# Patient Record
Sex: MALE | Race: WHITE | Employment: OTHER | URBAN - METROPOLITAN AREA
[De-identification: names, ages, dates, MRNs, and addresses within clinical notes are randomized per-mention and may not be internally consistent; named-entity substitution may affect disease eponyms.]

---

## 2018-03-19 ENCOUNTER — APPOINTMENT (OUTPATIENT)
Dept: CT IMAGING | Age: 63
DRG: 189 | End: 2018-03-19
Attending: EMERGENCY MEDICINE
Payer: COMMERCIAL

## 2018-03-19 ENCOUNTER — HOSPITAL ENCOUNTER (INPATIENT)
Age: 63
LOS: 3 days | Discharge: HOME HEALTH | DRG: 189 | End: 2018-03-22
Attending: EMERGENCY MEDICINE | Admitting: INTERNAL MEDICINE
Payer: COMMERCIAL

## 2018-03-19 DIAGNOSIS — R05.9 COUGH: ICD-10-CM

## 2018-03-19 DIAGNOSIS — J18.9 PNEUMONIA OF RIGHT LOWER LOBE DUE TO INFECTIOUS ORGANISM: ICD-10-CM

## 2018-03-19 DIAGNOSIS — J96.01 ACUTE RESPIRATORY FAILURE WITH HYPOXIA (HCC): Primary | ICD-10-CM

## 2018-03-19 DIAGNOSIS — J90 PLEURAL EFFUSION: ICD-10-CM

## 2018-03-19 PROBLEM — J96.90 RESPIRATORY FAILURE (HCC): Status: ACTIVE | Noted: 2018-03-19

## 2018-03-19 LAB
ALBUMIN SERPL-MCNC: 3.9 G/DL (ref 3.5–5)
ALBUMIN/GLOB SERPL: 1.1 {RATIO} (ref 1.1–2.2)
ALP SERPL-CCNC: 110 U/L (ref 45–117)
ALT SERPL-CCNC: 40 U/L (ref 12–78)
ANION GAP SERPL CALC-SCNC: 8 MMOL/L (ref 5–15)
AST SERPL-CCNC: 22 U/L (ref 15–37)
BASOPHILS # BLD: 0 K/UL (ref 0–0.1)
BASOPHILS NFR BLD: 0 % (ref 0–1)
BILIRUB SERPL-MCNC: 0.8 MG/DL (ref 0.2–1)
BNP SERPL-MCNC: 65 PG/ML (ref 0–125)
BUN SERPL-MCNC: 36 MG/DL (ref 6–20)
BUN/CREAT SERPL: 23 (ref 12–20)
CALCIUM SERPL-MCNC: 9.3 MG/DL (ref 8.5–10.1)
CHLORIDE SERPL-SCNC: 93 MMOL/L (ref 97–108)
CK MB CFR SERPL CALC: 1.6 % (ref 0–2.5)
CK MB SERPL-MCNC: 2.7 NG/ML (ref 5–25)
CK SERPL-CCNC: 171 U/L (ref 39–308)
CO2 SERPL-SCNC: 31 MMOL/L (ref 21–32)
CREAT SERPL-MCNC: 1.59 MG/DL (ref 0.7–1.3)
DIFFERENTIAL METHOD BLD: ABNORMAL
EOSINOPHIL # BLD: 0.2 K/UL (ref 0–0.4)
EOSINOPHIL NFR BLD: 2 % (ref 0–7)
ERYTHROCYTE [DISTWIDTH] IN BLOOD BY AUTOMATED COUNT: 14.2 % (ref 11.5–14.5)
GLOBULIN SER CALC-MCNC: 3.4 G/DL (ref 2–4)
GLUCOSE SERPL-MCNC: 372 MG/DL (ref 65–100)
HCT VFR BLD AUTO: 44.1 % (ref 36.6–50.3)
HGB BLD-MCNC: 14.7 G/DL (ref 12.1–17)
IMM GRANULOCYTES # BLD: 0.2 K/UL (ref 0–0.04)
IMM GRANULOCYTES NFR BLD AUTO: 2 % (ref 0–0.5)
LACTATE SERPL-SCNC: 2.7 MMOL/L (ref 0.4–2)
LACTATE SERPL-SCNC: 5.6 MMOL/L (ref 0.4–2)
LYMPHOCYTES # BLD: 1.5 K/UL (ref 0.8–3.5)
LYMPHOCYTES NFR BLD: 13 % (ref 12–49)
MCH RBC QN AUTO: 28.5 PG (ref 26–34)
MCHC RBC AUTO-ENTMCNC: 33.3 G/DL (ref 30–36.5)
MCV RBC AUTO: 85.5 FL (ref 80–99)
MONOCYTES # BLD: 1.4 K/UL (ref 0–1)
MONOCYTES NFR BLD: 13 % (ref 5–13)
NEUTS SEG # BLD: 8.2 K/UL (ref 1.8–8)
NEUTS SEG NFR BLD: 71 % (ref 32–75)
NRBC # BLD: 0 K/UL (ref 0–0.01)
NRBC BLD-RTO: 0 PER 100 WBC
PLATELET # BLD AUTO: 212 K/UL (ref 150–400)
PMV BLD AUTO: 9.6 FL (ref 8.9–12.9)
POTASSIUM SERPL-SCNC: 3.7 MMOL/L (ref 3.5–5.1)
PROT SERPL-MCNC: 7.3 G/DL (ref 6.4–8.2)
RBC # BLD AUTO: 5.16 M/UL (ref 4.1–5.7)
SODIUM SERPL-SCNC: 132 MMOL/L (ref 136–145)
TROPONIN I SERPL-MCNC: <0.04 NG/ML
WBC # BLD AUTO: 11.6 K/UL (ref 4.1–11.1)

## 2018-03-19 PROCEDURE — 82550 ASSAY OF CK (CPK): CPT | Performed by: EMERGENCY MEDICINE

## 2018-03-19 PROCEDURE — 77030029684 HC NEB SM VOL KT MONA -A

## 2018-03-19 PROCEDURE — 71275 CT ANGIOGRAPHY CHEST: CPT

## 2018-03-19 PROCEDURE — 99284 EMERGENCY DEPT VISIT MOD MDM: CPT

## 2018-03-19 PROCEDURE — 74011636320 HC RX REV CODE- 636/320: Performed by: EMERGENCY MEDICINE

## 2018-03-19 PROCEDURE — 74011250636 HC RX REV CODE- 250/636: Performed by: INTERNAL MEDICINE

## 2018-03-19 PROCEDURE — 84484 ASSAY OF TROPONIN QUANT: CPT | Performed by: EMERGENCY MEDICINE

## 2018-03-19 PROCEDURE — 80053 COMPREHEN METABOLIC PANEL: CPT | Performed by: EMERGENCY MEDICINE

## 2018-03-19 PROCEDURE — 94640 AIRWAY INHALATION TREATMENT: CPT

## 2018-03-19 PROCEDURE — 96361 HYDRATE IV INFUSION ADD-ON: CPT

## 2018-03-19 PROCEDURE — 36415 COLL VENOUS BLD VENIPUNCTURE: CPT | Performed by: INTERNAL MEDICINE

## 2018-03-19 PROCEDURE — 96375 TX/PRO/DX INJ NEW DRUG ADDON: CPT

## 2018-03-19 PROCEDURE — 65660000000 HC RM CCU STEPDOWN

## 2018-03-19 PROCEDURE — 87040 BLOOD CULTURE FOR BACTERIA: CPT | Performed by: EMERGENCY MEDICINE

## 2018-03-19 PROCEDURE — 85025 COMPLETE CBC W/AUTO DIFF WBC: CPT | Performed by: EMERGENCY MEDICINE

## 2018-03-19 PROCEDURE — 74011000250 HC RX REV CODE- 250: Performed by: INTERNAL MEDICINE

## 2018-03-19 PROCEDURE — 74011000250 HC RX REV CODE- 250: Performed by: EMERGENCY MEDICINE

## 2018-03-19 PROCEDURE — 74011250637 HC RX REV CODE- 250/637: Performed by: EMERGENCY MEDICINE

## 2018-03-19 PROCEDURE — 83605 ASSAY OF LACTIC ACID: CPT | Performed by: EMERGENCY MEDICINE

## 2018-03-19 PROCEDURE — 96365 THER/PROPH/DIAG IV INF INIT: CPT

## 2018-03-19 PROCEDURE — 74011250637 HC RX REV CODE- 250/637: Performed by: INTERNAL MEDICINE

## 2018-03-19 PROCEDURE — 74011000258 HC RX REV CODE- 258: Performed by: EMERGENCY MEDICINE

## 2018-03-19 PROCEDURE — 83605 ASSAY OF LACTIC ACID: CPT | Performed by: INTERNAL MEDICINE

## 2018-03-19 PROCEDURE — 74011250636 HC RX REV CODE- 250/636: Performed by: EMERGENCY MEDICINE

## 2018-03-19 PROCEDURE — 83880 ASSAY OF NATRIURETIC PEPTIDE: CPT | Performed by: EMERGENCY MEDICINE

## 2018-03-19 RX ORDER — LEVALBUTEROL INHALATION SOLUTION 1.25 MG/3ML
1.25 SOLUTION RESPIRATORY (INHALATION)
Status: DISCONTINUED | OUTPATIENT
Start: 2018-03-19 | End: 2018-03-22 | Stop reason: HOSPADM

## 2018-03-19 RX ORDER — BISACODYL 5 MG
5 TABLET, DELAYED RELEASE (ENTERIC COATED) ORAL DAILY PRN
Status: DISCONTINUED | OUTPATIENT
Start: 2018-03-19 | End: 2018-03-22 | Stop reason: HOSPADM

## 2018-03-19 RX ORDER — SODIUM CHLORIDE 0.9 % (FLUSH) 0.9 %
5-10 SYRINGE (ML) INJECTION EVERY 8 HOURS
Status: DISCONTINUED | OUTPATIENT
Start: 2018-03-19 | End: 2018-03-22 | Stop reason: HOSPADM

## 2018-03-19 RX ORDER — GUAIFENESIN 600 MG/1
600 TABLET, EXTENDED RELEASE ORAL EVERY 12 HOURS
Status: DISCONTINUED | OUTPATIENT
Start: 2018-03-19 | End: 2018-03-22 | Stop reason: HOSPADM

## 2018-03-19 RX ORDER — AMLODIPINE BESYLATE 5 MG/1
5 TABLET ORAL DAILY
Status: ON HOLD | COMMUNITY
End: 2018-03-22

## 2018-03-19 RX ORDER — HEPARIN SODIUM 5000 [USP'U]/ML
5000 INJECTION, SOLUTION INTRAVENOUS; SUBCUTANEOUS EVERY 8 HOURS
Status: DISCONTINUED | OUTPATIENT
Start: 2018-03-19 | End: 2018-03-22 | Stop reason: HOSPADM

## 2018-03-19 RX ORDER — SODIUM CHLORIDE 9 MG/ML
50 INJECTION, SOLUTION INTRAVENOUS
Status: COMPLETED | OUTPATIENT
Start: 2018-03-19 | End: 2018-03-19

## 2018-03-19 RX ORDER — SODIUM CHLORIDE 0.9 % (FLUSH) 0.9 %
5-10 SYRINGE (ML) INJECTION AS NEEDED
Status: DISCONTINUED | OUTPATIENT
Start: 2018-03-19 | End: 2018-03-22 | Stop reason: HOSPADM

## 2018-03-19 RX ORDER — ALLOPURINOL 100 MG/1
200 TABLET ORAL DAILY
Status: ON HOLD | COMMUNITY
End: 2018-03-22

## 2018-03-19 RX ORDER — ONDANSETRON 2 MG/ML
4 INJECTION INTRAMUSCULAR; INTRAVENOUS
Status: DISCONTINUED | OUTPATIENT
Start: 2018-03-19 | End: 2018-03-22 | Stop reason: HOSPADM

## 2018-03-19 RX ORDER — GUAIFENESIN/DEXTROMETHORPHAN 100-10MG/5
5 SYRUP ORAL
Status: DISCONTINUED | OUTPATIENT
Start: 2018-03-19 | End: 2018-03-22 | Stop reason: HOSPADM

## 2018-03-19 RX ORDER — CODEINE PHOSPHATE AND GUAIFENESIN 10; 100 MG/5ML; MG/5ML
5-10 SOLUTION ORAL
Status: ON HOLD | COMMUNITY
End: 2018-03-22

## 2018-03-19 RX ORDER — SODIUM CHLORIDE 9 MG/ML
125 INJECTION, SOLUTION INTRAVENOUS CONTINUOUS
Status: DISCONTINUED | OUTPATIENT
Start: 2018-03-19 | End: 2018-03-21

## 2018-03-19 RX ORDER — SODIUM CHLORIDE 0.9 % (FLUSH) 0.9 %
10 SYRINGE (ML) INJECTION
Status: COMPLETED | OUTPATIENT
Start: 2018-03-19 | End: 2018-03-19

## 2018-03-19 RX ORDER — ALBUTEROL SULFATE 2.5 MG/.5ML
5 SOLUTION RESPIRATORY (INHALATION)
Status: COMPLETED | OUTPATIENT
Start: 2018-03-19 | End: 2018-03-19

## 2018-03-19 RX ORDER — SERTRALINE HYDROCHLORIDE 25 MG/1
25 TABLET, FILM COATED ORAL DAILY
Status: ON HOLD | COMMUNITY
End: 2018-03-22

## 2018-03-19 RX ORDER — HYDROCODONE POLISTIREX AND CHLORPHENIRAMINE POLISTIREX 10; 8 MG/5ML; MG/5ML
5 SUSPENSION, EXTENDED RELEASE ORAL
Status: COMPLETED | OUTPATIENT
Start: 2018-03-19 | End: 2018-03-19

## 2018-03-19 RX ORDER — AZITHROMYCIN 250 MG/1
250 TABLET, FILM COATED ORAL SEE ADMIN INSTRUCTIONS
Status: ON HOLD | COMMUNITY
End: 2018-03-22

## 2018-03-19 RX ORDER — HYDROCHLOROTHIAZIDE 25 MG/1
25 TABLET ORAL DAILY
COMMUNITY
End: 2018-03-22

## 2018-03-19 RX ORDER — ALBUTEROL SULFATE 90 UG/1
1-2 AEROSOL, METERED RESPIRATORY (INHALATION)
Status: ON HOLD | COMMUNITY
End: 2018-03-22

## 2018-03-19 RX ORDER — HYDRALAZINE HYDROCHLORIDE 20 MG/ML
10 INJECTION INTRAMUSCULAR; INTRAVENOUS
Status: DISCONTINUED | OUTPATIENT
Start: 2018-03-19 | End: 2018-03-22 | Stop reason: HOSPADM

## 2018-03-19 RX ORDER — ASPIRIN 81 MG/1
81 TABLET ORAL DAILY
Status: ON HOLD | COMMUNITY
End: 2018-03-22

## 2018-03-19 RX ORDER — LISINOPRIL 10 MG/1
10 TABLET ORAL DAILY
Status: ON HOLD | COMMUNITY
End: 2018-03-22

## 2018-03-19 RX ORDER — ACETAMINOPHEN 325 MG/1
650 TABLET ORAL
Status: DISCONTINUED | OUTPATIENT
Start: 2018-03-19 | End: 2018-03-22 | Stop reason: HOSPADM

## 2018-03-19 RX ADMIN — Medication 10 ML: at 23:49

## 2018-03-19 RX ADMIN — Medication 10 ML: at 18:00

## 2018-03-19 RX ADMIN — SODIUM CHLORIDE 1000 ML: 900 INJECTION, SOLUTION INTRAVENOUS at 12:05

## 2018-03-19 RX ADMIN — LEVALBUTEROL HYDROCHLORIDE 1.25 MG: 1.25 SOLUTION RESPIRATORY (INHALATION) at 16:50

## 2018-03-19 RX ADMIN — HEPARIN SODIUM 5000 UNITS: 5000 INJECTION, SOLUTION INTRAVENOUS; SUBCUTANEOUS at 17:59

## 2018-03-19 RX ADMIN — GUAIFENESIN 600 MG: 600 TABLET, EXTENDED RELEASE ORAL at 21:04

## 2018-03-19 RX ADMIN — ALBUTEROL SULFATE 5 MG: 2.5 SOLUTION RESPIRATORY (INHALATION) at 12:05

## 2018-03-19 RX ADMIN — CEFTRIAXONE 1 G: 1 INJECTION, POWDER, FOR SOLUTION INTRAMUSCULAR; INTRAVENOUS at 13:49

## 2018-03-19 RX ADMIN — HEPARIN SODIUM 5000 UNITS: 5000 INJECTION, SOLUTION INTRAVENOUS; SUBCUTANEOUS at 23:21

## 2018-03-19 RX ADMIN — METHYLPREDNISOLONE SODIUM SUCCINATE 125 MG: 125 INJECTION, POWDER, FOR SOLUTION INTRAMUSCULAR; INTRAVENOUS at 12:04

## 2018-03-19 RX ADMIN — HYDROCODONE POLISTIREX AND CHLORPHENIRAMINE POLISTIREX 5 ML: 10; 8 SUSPENSION, EXTENDED RELEASE ORAL at 12:04

## 2018-03-19 RX ADMIN — SODIUM CHLORIDE 75 ML/HR: 900 INJECTION, SOLUTION INTRAVENOUS at 18:00

## 2018-03-19 RX ADMIN — IOPAMIDOL 100 ML: 755 INJECTION, SOLUTION INTRAVENOUS at 14:20

## 2018-03-19 RX ADMIN — SODIUM CHLORIDE 50 ML/HR: 900 INJECTION, SOLUTION INTRAVENOUS at 14:21

## 2018-03-19 RX ADMIN — AZITHROMYCIN 500 MG: 500 INJECTION, POWDER, LYOPHILIZED, FOR SOLUTION INTRAVENOUS at 14:51

## 2018-03-19 RX ADMIN — METHYLPREDNISOLONE SODIUM SUCCINATE 40 MG: 40 INJECTION, POWDER, FOR SOLUTION INTRAMUSCULAR; INTRAVENOUS at 21:04

## 2018-03-19 RX ADMIN — GUAIFENESIN AND DEXTROMETHORPHAN 5 ML: 100; 10 SYRUP ORAL at 23:50

## 2018-03-19 RX ADMIN — Medication 10 ML: at 14:52

## 2018-03-19 RX ADMIN — LEVALBUTEROL HYDROCHLORIDE 1.25 MG: 1.25 SOLUTION RESPIRATORY (INHALATION) at 20:18

## 2018-03-19 RX ADMIN — Medication 10 ML: at 14:21

## 2018-03-19 RX ADMIN — SODIUM CHLORIDE 1000 ML: 900 INJECTION, SOLUTION INTRAVENOUS at 13:49

## 2018-03-19 RX ADMIN — SODIUM CHLORIDE 125 ML/HR: 900 INJECTION, SOLUTION INTRAVENOUS at 21:03

## 2018-03-19 NOTE — ED NOTES
TRANSFER - OUT REPORT:    Verbal report given to SASHA Nicholas(name) on Kya Fontenot  being transferred to [facility](unit) for routine progression of care       Report consisted of patients Situation, Background, Assessment and   Recommendations(SBAR). Information from the following report(s) SBAR, ED Summary and Recent Results was reviewed with the receiving nurse. Lines:   Peripheral IV 03/19/18 Right Antecubital (Active)   Site Assessment Clean, dry, & intact 3/19/2018 11:57 AM   Phlebitis Assessment 0 3/19/2018 11:57 AM   Infiltration Assessment 0 3/19/2018 11:57 AM   Dressing Status Clean, dry, & intact 3/19/2018 11:57 AM   Hub Color/Line Status Pink 3/19/2018 11:57 AM   Action Taken Blood drawn 3/19/2018 11:57 AM        Opportunity for questions and clarification was provided.       Patient transported with:   [employer]

## 2018-03-19 NOTE — ED PROVIDER NOTES
EMERGENCY DEPARTMENT HISTORY AND PHYSICAL EXAM      Date: 3/19/2018  Patient Name: Jeanette Hess    History of Presenting Illness     Chief Complaint   Patient presents with    Shortness of Breath     [facility]/o increased shortness of breath, completed 2 rounds Zithromax today with lwo o2 sat's    Cough     greenish sputum       History Provided By: Patient    HPI: Jeanette Hess, 58 y.o. male with PMHx significant for HTN, presents ambulatory to the ED as sent by [facility] with cc of constant, ongoing SOB with associated productive cough of yellow/green sputum x 6 weeks. Pt states that he went to [facility] today after experiencing symptoms for several weeks without relief; while there, he was given 2 duo-nebs after his SpO2 was found to be 88% on RA for which he was referred to the ED for further evaluation and treatment; he reports feeling better after treatment with the duo-nebs, however. He states he's been treated twice for symptoms, once with a Z-Todd and again with steroids and Tessalon Perles without significant relief. Pt states that he's now on a second round of Z-Todd and Prednisone as started on 3/17/18 without relief. He states the cough is worse at night. He reports having to sit up in order to help improve his SOB at night when trying to sleep. He denies any hx of emphysema, asthma, MI, DVT, or PE. He did receive the flu vaccination this season. He denies any leg swelling or calf pain, fever, CP, or further symptoms and complaints. PCP: Wilmer Forrest MD    There are no other complaints, changes, or physical findings at this time. Past History     Past Medical History:  Past Medical History:   Diagnosis Date    Hypertension        Past Surgical History:  Past Surgical History:   Procedure Laterality Date    HX LITHOTRIPSY         Family History:  History reviewed. No pertinent family history.     Social History:  Social History   Substance Use Topics    Smoking status: Former Smoker    Smokeless tobacco: Never Used    Alcohol use Yes       Allergies:  No Known Allergies      Review of Systems   Review of Systems   Constitutional: Negative. Negative for appetite change, chills, fatigue and fever. HENT: Negative. Negative for congestion, rhinorrhea, sinus pressure and sore throat. Eyes: Negative. Respiratory: Positive for cough and shortness of breath. Negative for choking, chest tightness and wheezing. Cardiovascular: Negative. Negative for chest pain, palpitations and leg swelling. Gastrointestinal: Negative for abdominal pain, constipation, diarrhea, nausea and vomiting. Endocrine: Negative. Genitourinary: Negative. Negative for difficulty urinating, dysuria, flank pain and urgency. Musculoskeletal: Negative. Negative for myalgias (calf pain). Skin: Negative. Neurological: Negative. Negative for dizziness, speech difficulty, weakness, light-headedness, numbness and headaches. Psychiatric/Behavioral: Negative. All other systems reviewed and are negative. Physical Exam   Physical Exam   Constitutional: He is oriented to person, place, and time. He appears well-developed and well-nourished. No distress. HENT:   Head: Normocephalic and atraumatic. Mouth/Throat: Oropharynx is clear and moist. No oropharyngeal exudate. Eyes: Conjunctivae and EOM are normal. Pupils are equal, round, and reactive to light. Neck: Normal range of motion. Neck supple. No JVD present. No tracheal deviation present. Cardiovascular: Regular rhythm, normal heart sounds and intact distal pulses. No murmur heard. Tachycardic   Pulmonary/Chest: No stridor. He is in respiratory distress. He has wheezes. He has no rales. He exhibits no tenderness. + rhonchi   Abdominal: Soft. He exhibits no distension. There is no tenderness. There is no rebound and no guarding. Obese   Musculoskeletal: Normal range of motion. He exhibits no edema or tenderness. Neurological: He is alert and oriented to person, place, and time. No cranial nerve deficit. No gross motor or sensory deficits    Skin: Skin is warm and dry. He is not diaphoretic. Psychiatric: He has a normal mood and affect. His behavior is normal.   Nursing note and vitals reviewed. Diagnostic Study Results     Labs -  Recent Results (from the past 12 hour(s))   CBC WITH AUTOMATED DIFF    Collection Time: 03/19/18 11:58 AM   Result Value Ref Range    WBC 11.6 (H) 4.1 - 11.1 K/uL    RBC 5.16 4.10 - 5.70 M/uL    HGB 14.7 12.1 - 17.0 g/dL    HCT 44.1 36.6 - 50.3 %    MCV 85.5 80.0 - 99.0 FL    MCH 28.5 26.0 - 34.0 PG    MCHC 33.3 30.0 - 36.5 g/dL    RDW 14.2 11.5 - 14.5 %    PLATELET 876 825 - 852 K/uL    MPV 9.6 8.9 - 12.9 FL    NRBC 0.0 0  WBC    ABSOLUTE NRBC 0.00 0.00 - 0.01 K/uL    NEUTROPHILS 71 32 - 75 %    LYMPHOCYTES 13 12 - 49 %    MONOCYTES 13 5 - 13 %    EOSINOPHILS 2 0 - 7 %    BASOPHILS 0 0 - 1 %    IMMATURE GRANULOCYTES 2 (H) 0.0 - 0.5 %    ABS. NEUTROPHILS 8.2 (H) 1.8 - 8.0 K/UL    ABS. LYMPHOCYTES 1.5 0.8 - 3.5 K/UL    ABS. MONOCYTES 1.4 (H) 0.0 - 1.0 K/UL    ABS. EOSINOPHILS 0.2 0.0 - 0.4 K/UL    ABS. BASOPHILS 0.0 0.0 - 0.1 K/UL    ABS. IMM. GRANS. 0.2 (H) 0.00 - 0.04 K/UL    DF AUTOMATED     METABOLIC PANEL, COMPREHENSIVE    Collection Time: 03/19/18 11:58 AM   Result Value Ref Range    Sodium 132 (L) 136 - 145 mmol/L    Potassium 3.7 3.5 - 5.1 mmol/L    Chloride 93 (L) 97 - 108 mmol/L    CO2 31 21 - 32 mmol/L    Anion gap 8 5 - 15 mmol/L    Glucose 372 (H) 65 - 100 mg/dL    BUN 36 (H) 6 - 20 MG/DL    Creatinine 1.59 (H) 0.70 - 1.30 MG/DL    BUN/Creatinine ratio 23 (H) 12 - 20      GFR est AA 54 (L) >60 ml/min/1.73m2    GFR est non-AA 44 (L) >60 ml/min/1.73m2    Calcium 9.3 8.5 - 10.1 MG/DL    Bilirubin, total 0.8 0.2 - 1.0 MG/DL    ALT (SGPT) 40 12 - 78 U/L    AST (SGOT) 22 15 - 37 U/L    Alk.  phosphatase 110 45 - 117 U/L    Protein, total 7.3 6.4 - 8.2 g/dL    Albumin 3.9 3.5 - 5.0 g/dL    Globulin 3.4 2.0 - 4.0 g/dL    A-G Ratio 1.1 1.1 - 2.2     CK W/ CKMB & INDEX    Collection Time: 03/19/18 11:58 AM   Result Value Ref Range     39 - 308 U/L    CK - MB 2.7 <3.6 NG/ML    CK-MB Index 1.6 0 - 2.5     NT-PRO BNP    Collection Time: 03/19/18 11:58 AM   Result Value Ref Range    NT pro-BNP 65 0 - 125 PG/ML   TROPONIN I    Collection Time: 03/19/18 11:58 AM   Result Value Ref Range    Troponin-I, Qt. <0.04 <0.05 ng/mL   LACTIC ACID    Collection Time: 03/19/18  1:39 PM   Result Value Ref Range    Lactic acid 2.7 (HH) 0.4 - 2.0 MMOL/L       Radiologic Studies -   CT Results  (Last 48 hours)               03/19/18 1421  CTA CHEST W OR W WO CONT Final result    Impression:  IMPRESSION:   No evidence of acute pulmonary embolus. Bilateral lower lobe atelectasis. Narrative:  INDICATION: Dyspnea on exertion. Tachycardia. COMPARISON:None       TECHNIQUE:     Routine noncontrast imaging the chest was performed for localization purposes. Then, following the uneventful intravenous administration of 100 cc [id number],   thin helical axial images were obtained through the chest. 3D image   postprocessing was performed. CT dose reduction was achieved through use of a   standardized protocol tailored for this examination and automatic exposure   control for dose modulation. FINDINGS:       THYROID: No nodule. MEDIASTINUM: No mass or lymphadenopathy. MAN: No mass or lymphadenopathy. THORACIC AORTA: No dissection or aneurysm. PULMONARY ARTERIES: Main pulmonary artery is normal in caliber. No evidence of   acute pulmonary emboli. TRACHEA/BRONCHI: Patent. ESOPHAGUS: No wall thickening or dilatation. HEART: Normal in size. PLEURA: No effusion or pneumothorax. LUNGS: Bilateral lower lobe atelectasis. No pulmonary nodule. INCIDENTALLY IMAGED UPPER ABDOMEN: No focal abnormality. BONES: No destructive bone lesion.    ADDITIONAL COMMENTS: N/A Medical Decision Making   I am the first provider for this patient. I reviewed the vital signs, available nursing notes, past medical history, past surgical history, family history and social history. Vital Signs-Reviewed the patient's vital signs. Patient Vitals for the past 12 hrs:   Temp Pulse Resp BP SpO2   03/19/18 1313 - - - - 96 %   03/19/18 1303 - - - - 92 %   03/19/18 1303 - - - - (!) 88 %   03/19/18 1303 - - - - (!) 89 %   03/19/18 1238 - - - - 95 %   03/19/18 1235 - - - - 97 %   03/19/18 1234 - - - - 96 %   03/19/18 1233 - - - - 95 %   03/19/18 1232 - - - - 98 %   03/19/18 1231 - - - - 97 %   03/19/18 1230 - - - - 96 %   03/19/18 1229 - - - - 95 %   03/19/18 1228 - - - - 97 %   03/19/18 1221 - - - - 92 %   03/19/18 1130 - - - 118/76 91 %   03/19/18 1122 - - - 131/72 -   03/19/18 1120 - - - - 92 %   03/19/18 1033 97.9 °F (36.6 °C) (!) 120 22 125/88 94 %       Records Reviewed: Nursing Notes and Old Medical Records    Provider Notes (Medical Decision Making):   DDx: Bronchitis, pneumonia, CHF    ED Course:   Initial assessment performed. The patients presenting problems have been discussed, and they are in agreement with the care plan formulated and outlined with them. I have encouraged them to ask questions as they arise throughout their visit. Pt tachycardic upon arrival to the ED, which is likely do to 2 breathing tx's at [facility], X-ray from [facility] reviewed, ? Elevation right jennifer-diaphragm v. Pleural effusion, will obtain CT of chest.    After 2 additional breathing tx's, pt breathing improved, however pt still with O2 sats dropping to to 87-88% at rest in the bed with increase work of breathing. CT no PE, no infiltrate will start IV Ab for bronchitis, pt given steroids in the ED as well.     1:11 PM  I have just reevaluated the patient.  I have reviewed his vital signs and determined there is currently no worsening in their condition or physical exam. Results have been reviewed with them and their questions have been answered. Pt agreeable to admission at this time. Will speak with hospitalist.     CONSULT NOTE:   1:25 PM  Jazmin Chowdhury DO spoke with Dr. Jamie Diallo,   Specialty: Hospitalist  Discussed pt's hx, disposition, and available diagnostic and imaging results. Reviewed care plans. Consultant asks that we check a CTA chest. Pending results, she may evaluate pt for admission. CRITICAL CARE NOTE :    IMPENDING DETERIORATION -Respiratory and Cardiovascular  ASSOCIATED RISK FACTORS - Hypoxia and Dysrhythmia  MANAGEMENT- Bedside Assessment and Supervision of Care  INTERPRETATION -  Xrays, CT Scan, Blood Pressure and Cardiac Output Measures   INTERVENTIONS - Oxygen, steroids, nebulizer tx's, IV Ab  CASE REVIEW - Hospitalist, Nursing and Family  TREATMENT RESPONSE -Improved  PERFORMED BY - Self    NOTES   :  I have spent 40 minutes of critical care time involved in lab review, consultations with specialist, family decision- making, bedside attention and documentation. During this entire length of time I was immediately available to the patient . Nitish Cardoso DO    Disposition:  Admit Note:  1:33 PM  Patient is being admitted to the hospital by Dr. Jamie Diallo. The results of their tests and reasons for their admission have been discussed with them and/or available family. They convey agreement and understanding for the need to be admitted and for their admission diagnosis. Consultation has been made with the inpatient physician specialist for hospitalization. PLAN:  1. Admit to Hospitalist    Diagnosis     Clinical Impression:   1. Acute respiratory failure with hypoxia ([address]    2. Pneumonia of right lower lobe due to infectious organism ([address]    3. Pleural effusion        Attestations:     This note is prepared by Viviane Julio, acting as Scribe for Jazmin Chowdhury, [address] [place] [address], DO: The scribe's documentation has been prepared under my direction and personally reviewed by me in its entirety. I confirm that the note above accurately reflects all work, treatment, procedures, and medical decision making performed by me.

## 2018-03-19 NOTE — IP AVS SNAPSHOT
[address] [facility]. 
[phone] Patient: Anoop Tejada MRN: [id number] About your hospitalization You were admitted on:  March 19, 2018 You last received care in the:  [facility] CARDIOPULMONARY CARE You were discharged on:  March 22, 2018 Why you were hospitalized Your primary diagnosis was:  Not on File Your diagnoses also included:  Respiratory Failure (Hcc) Follow-up Information Follow up With Details Comments Contact Info Akanksha Livingston MD Go on 4/3/2018 For new patient appointment at 10:30AM  [address] [facility] [address] [phone] [employer]   [address] [facility] [address] [place] [address] 
[phone] Phys Other, MD   Patient can only remember the practice name and not the physician [facility] Go on 4/13/2018 appointment for class- starts at 1:00 pm [facility]. [address] [facility] 
[phone] Your Scheduled Appointments Tuesday April 03, 2018 10:30 AM EDT PHYSICAL PRE OP with Akanksha Livingston MD  
[address] ([address]) [address], [address] [address]  
[phone] Friday April 13, 2018  1:00 PM EDT  
DIABETES CLASS 1HR with SURVIVAL SKILLS CLASS [facility]  
[facility] ([address]) [address] [facility]. [facility].  
[phone] Discharge Orders None A check kwesi indicates which time of day the medication should be taken. My Medications START taking these medications Instructions Each Dose to Equal  
 Morning Noon Evening Bedtime  
 insulin glargine 100 unit/mL injection Commonly known as:  LANTUS Your last dose was: Your next dose is:    
   
   
 12 Units by SubCUTAneous route nightly. 12 Units L. acidoph & paracasei- S therm- Bifido 8 billion cell Cap cap Commonly known as:  LETICIA-Q/RISAQUAD Start taking on:  3/23/2018 Your last dose was: Your next dose is: Take 1 Cap by mouth daily. 1 Cap  
    
   
   
   
  
 levalbuterol 1.25 mg/3 mL Nebu Commonly known as:  Apryl American Your last dose was: Your next dose is:    
   
   
 3 mL by Nebulization route every four (4) hours as needed. 1.25 mg  
    
   
   
   
  
 melatonin 3 mg tablet Your last dose was: Your next dose is: Take 1 Tab by mouth nightly as needed. 3 mg Nebulizer Accessories Kit Your last dose was: Your next dose is:    
   
   
 by Nebulization route every six (6) hours as needed for Cough. predniSONE 10 mg tablet Commonly known as:  Sheela Lucero Start taking on:  3/23/2018 Your last dose was: Your next dose is: Take 2 Tabs by mouth daily (with breakfast). 1 tablet once a day for 2 days then 1/2 tablet once a day for 2 days 20 mg CHANGE how you take these medications Instructions Each Dose to Equal  
 Morning Noon Evening Bedtime  
 azithromycin 250 mg tablet Commonly known as:  Elvi Larsen What changed:  additional instructions Your last dose was: Your next dose is: Take 1 Tab by mouth See Admin Instructions. Take 1 tablet once a day for 3 days 250 mg CONTINUE taking these medications Instructions Each Dose to Equal  
 Morning Noon Evening Bedtime  
 albuterol 90 mcg/actuation inhaler Commonly known as:  PROVENTIL HFA, VENTOLIN HFA, PROAIR HFA Your last dose was: Your next dose is: Take 1-2 Puffs by inhalation every four (4) hours as needed for Wheezing or Shortness of Breath. 1-2 Puff  
    
   
   
   
  
 allopurinol 100 mg tablet Commonly known as:  Karolinaobe Price Your last dose was: Your next dose is: Take 2 Tabs by mouth daily. 200 mg  
    
   
   
   
  
 amLODIPine 5 mg tablet Commonly known as:  Peter Rendon Your last dose was: Your next dose is: Take 1 Tab by mouth daily. 5 mg  
    
   
   
   
  
 aspirin delayed-release 81 mg tablet Your last dose was: Your next dose is: Take 1 Tab by mouth daily. 81 mg  
    
   
   
   
  
 guaiFENesin-codeine 100-10 mg/5 mL solution Commonly known as:  ROBITUSSIN AC Your last dose was: Your next dose is: Take 5-10 mL by mouth every four (4) hours as needed for Cough. Max Daily Amount: 60 mL. 5-10 mL  
    
   
   
   
  
 lisinopril 10 mg tablet Commonly known as:  [place] Art Your last dose was: Your next dose is: Take 1 Tab by mouth daily. 10 mg  
    
   
   
   
  
 sertraline 25 mg tablet Commonly known as:  ZOLOFT Your last dose was: Your next dose is: Take 1 Tab by mouth daily. 25 mg  
    
   
   
   
  
  
STOP taking these medications   
 hydroCHLOROthiazide 25 mg tablet Commonly known as:  HYDRODIURIL Where to Get Your Medications Information on where to get these meds will be given to you by the nurse or doctor. ! Ask your nurse or doctor about these medications  
  albuterol 90 mcg/actuation inhaler  
 allopurinol 100 mg tablet  
 amLODIPine 5 mg tablet  
 aspirin delayed-release 81 mg tablet  
 azithromycin 250 mg tablet  
 guaiFENesin-codeine 100-10 mg/5 mL solution  
 insulin glargine 100 unit/mL injection L. acidoph & paracasei- S therm- Bifido 8 billion cell Cap cap  
 levalbuterol 1.25 mg/3 mL Nebu  
 lisinopril 10 mg tablet  
 melatonin 3 mg tablet Nebulizer Accessories Kit  
 predniSONE 10 mg tablet  
 sertraline 25 mg tablet Discharge Instructions None Introducing [facility]! Maryjo العراقي introduces [facility] [facility] patient portal. Now you can access parts of your medical record, email your doctor's office, and request medication refills online. 1. In your internet browser, go to [email or web address] 2. Click on the First Time User? Click Here link in the Sign In box. You will see the New Member Sign Up page. 3. Enter your [facility] Access Code exactly as it appears below. You will not need to use this code after youve completed the sign-up process. If you do not sign up before the expiration date, you must request a new code. · [facility] Access Code: [email or web address] Expires: 6/17/2018 11:22 AM 
 
4. Enter the last four digits of your Social Security Number (xxxx) and Date of Birth (mm/dd/yyyy) as indicated and click Submit. You will be taken to the next sign-up page. 5. Create a [facility] ID. This will be your [facility] login ID and cannot be changed, so think of one that is secure and easy to remember. 6. Create a [facility] password. You can change your password at any time. 7. Enter your Password Reset Question and Answer. This can be used at a later time if you forget your password. 8. Enter your e-mail address. You will receive e-mail notification when new information is available in [address] 9. Click Sign Up. You can now view and download portions of your medical record. 10. Click the Download Summary menu link to download a portable copy of your medical information. If you have questions, please visit the Frequently Asked Questions section of the [employer] website. Remember, [facility] is NOT to be used for urgent needs. For medical emergencies, dial 911. Now available from your iPhone and Android! Unresulted Labs-Please follow up with your PCP about these lab tests Order Current Status CULTURE, BLOOD, PAIRED Preliminary result Providers Seen During Your Hospitalization Provider Specialty Primary office phone Brannon Bass DO Emergency Medicine [phone] Thi Baker MD Hospitalist [phone] Myles Ron MD Hospitalist [phone] Your Primary Care Physician (PCP) Primary Care Physician Office Phone Office Fax OTHER, PHYS ** None ** ** None ** You are allergic to the following No active allergies Recent Documentation Height Weight BMI Smoking Status 1.651 m 112.8 kg 41.37 kg/m2 Former Smoker Emergency Contacts Name Discharge Info Relation Home Work Mobile Ian Boggs 82 CAREGIVER [3] Spouse [3] [phone] [address] [place] [address] CAREGIVER [3] Brother [24]   [phone] Patient Belongings The following personal items are in your possession at time of discharge: 
  Dental Appliances: None  Visual Aid: None      Home Medications: None   Jewelry: Ring, With patient  Clothing: Footwear, Pants, Shirt, Undergarments, With patient    Other Valuables: Cell Phone, Richarda Sacks (wallet and keys sent home) Please provide this summary of care documentation to your next provider. Signatures-by signing, you are acknowledging that this After Visit Summary has been reviewed with you and you have received a copy. Patient Signature:  ____________________________________________________________ Date:  ____________________________________________________________  
  
Rachele Sleeper Provider Signature:  ____________________________________________________________ Date:  ____________________________________________________________

## 2018-03-19 NOTE — PROGRESS NOTES
Pharmacy Clarification of the Prior to Admission Medication Regimen Retrospective to the Admission Medication Reconciliation    The patient was interviewed regarding clarification of the prior to admission medication regimen. Brother was present in room and obtained permission from patient to discuss drug regimen with visitor(s) present. Patient was questioned regarding use of any other inhalers, topical products, over the counter medications, herbal medications, vitamin products or ophthalmic/nasal/otic medication use. Patient was uncertain of the names and strengths of his medications. MHT called [facility], [phone], and spoke with Matt Woodruff, who was able to confirm the patients medication names and strengths. Information Obtained From: Patient, outpatient pharmacy    Recommendations/Findings: The following amendments were made to the patient's [place] [address] [place]:     1) Additions: MHT added all 9 medications below    2) Removals: NONE    3) Changes: NONE    4) Pertinent Pharmacy Findings:   aspirin delayed-release 81 mg tablet: Patient stated this agent is prescribed daily, but the patient takes it when he 'remembers'.  azithromycin (ZITHROMAX) 250 mg tablet: Patient started a 5 day regimen on 3/17/18. Patient has completed 3 days of therapy as of 3/19/18.  guaiFENesin-codeine (ROBITUSSIN AC) 100-10 mg/5 mL solution: Patient stated he does not measure out this agent, he 'swigs it' when needed. PTA medication list was corrected to the following:     Prior to Admission Medications   Prescriptions Last Dose Informant Patient Reported? Taking? albuterol (PROVENTIL HFA, VENTOLIN HFA, PROAIR HFA) 90 mcg/actuation inhaler 3/18/2018 at Unknown time Other Yes Yes   Sig: Take 1-2 Puffs by inhalation every four (4) hours as needed for Wheezing or Shortness of Breath.    allopurinol (ZYLOPRIM) 100 mg tablet 3/19/2018 at Unknown time Other Yes Yes   Sig: Take 200 mg by mouth daily. amLODIPine (NORVASC) 5 mg tablet 3/19/2018 at Unknown time Other Yes Yes   Sig: Take 5 mg by mouth daily. aspirin delayed-release 81 mg tablet 3/5/2018 at Unknown time Other Yes Yes   Sig: Take 81 mg by mouth daily. azithromycin (ZITHROMAX) 250 mg tablet 3/19/2018 at Unknown time Other Yes Yes   Sig: Take 250 mg by mouth See Admin Instructions. Take 2 tabs (500 mg) day 1, and 1 tab (250 mg) for days 2-5   guaiFENesin-codeine (ROBITUSSIN AC) 100-10 mg/5 mL solution 3/19/2018 at 0700 Other Yes Yes   Sig: Take 5-10 mL by mouth every four (4) hours as needed for Cough. hydroCHLOROthiazide (HYDRODIURIL) 25 mg tablet 3/19/2018 at Unknown time Other Yes Yes   Sig: Take 25 mg by mouth daily. lisinopril (PRINIVIL, ZESTRIL) 10 mg tablet 3/19/2018 at Unknown time Other Yes Yes   Sig: Take 10 mg by mouth daily. sertraline (ZOLOFT) 25 mg tablet 3/19/2018 at Unknown time Other Yes Yes   Sig: Take 25 mg by mouth daily.       Facility-Administered Medications: None          Thank you,  Campbell Ferrara CPhT  Medication History Pharmacy Technician

## 2018-03-19 NOTE — PROGRESS NOTES
TRANSFER - IN REPORT:    Verbal report received from Alena(name) on [place] [facility]  being received from ED(unit) for routine progression of care      Report consisted of patients Situation, Background, Assessment and   Recommendations(SBAR). Information from the following report(s) SBAR, Kardex, ED Summary, Procedure Summary, Intake/Output, MAR, Accordion, Recent Results, Med Rec Status and Cardiac Rhythm ST was reviewed with the receiving nurse. Opportunity for questions and clarification was provided. Assessment completed upon patients arrival to unit and care assumed. Primary Nurse Nataliia Hatch and María Gonzalez RN performed a dual skin assessment on this patient No impairment noted  Kristopher score is 20    1900  Lab called to report critial lactic of 5.6  Dr. Buck Lopez notified, new orders received. Increase fluids to 125 ml/hr, repeat lactics q4 until under 2, once lactic comes down fluids can return to 75 ml/hr. Bedside shift change report given to Janie Graf (oncoming nurse) by Nahum Lee (offgoing nurse). Report included the following information SBAR, Kardex, ED Summary, Procedure Summary, Intake/Output, MAR, Accordion, Recent Results, Med Rec Status and Cardiac Rhythm ST.     SHIFT SUMMARY:            [facility] NURSING NOTE   Admission Date 3/19/2018   Admission Diagnosis Respiratory failure ([address]   Consults None      Cardiac Monitoring [x] Yes [] No      Purposeful Hourly Rounding [x] Yes    Alyse Score Total Score: 2   Alyse score 3 or > [] Bed Alarm [] Avasys [] 1:1 sitter [] Patient refused (Place signed refusal form in chart)   Kristopher Score Kristopher Score: 20   Kristopher score 14 or < [] PMT consult [] Wound Care consult    []  Specialty bed  [] Nutrition consult      Influenza Vaccine Received Flu Vaccine for Current Season (usually Sept-March): Yes           Oxygen needs?  [] Room air Oxygen @  []1L    [x]2L    []3L   []4L    []5L   []6L     Use home O2? [] Yes [x] No  Perform O2 challenge test using smartphrase (.Homeoxygen)      Last bowel movement Last Bowel Movement Date: 03/18/18      Urinary Catheter             LDAs               Peripheral IV 03/19/18 Right Antecubital (Active)   Site Assessment Clean, dry, & intact 3/19/2018  4:50 PM   Phlebitis Assessment 0 3/19/2018  4:50 PM   Infiltration Assessment 0 3/19/2018  4:50 PM   Dressing Status Clean, dry, & intact 3/19/2018  4:50 PM   Dressing Type Tape;Transparent 3/19/2018  4:50 PM   Hub Color/Line Status Pink;Flushed 3/19/2018  4:50 PM   Action Taken Blood drawn 3/19/2018 11:57 AM       Peripheral IV 03/19/18 Left Arm (Active)   Site Assessment Clean, dry, & intact 3/19/2018  6:15 PM   Phlebitis Assessment 0 3/19/2018  6:15 PM   Infiltration Assessment 0 3/19/2018  6:15 PM   Dressing Status Clean, dry, & intact 3/19/2018  6:15 PM   Dressing Type Tape;Transparent 3/19/2018  6:15 PM   Hub Color/Line Status Blue;Flushed 3/19/2018  6:15 PM                         Readmission Risk Assessment Tool Score Low Risk            0       Total Score            Criteria that do not apply:    Has Seen PCP in Last 6 Months (Yes=3, No=0)    . Living with Significant Other. Assisted Living. LTAC. SNF. or   Rehab    Patient Length of Stay (>5 days = 3)    IP Visits Last 12 Months (1-3=4, 4=9, >4=11)    Pt.  Coverage (Medicare=5 , Medicaid, or Self-Pay=4)    Charlson Comorbidity Score (Age + Comorbid Conditions)       Expected Length of Stay - - -   Actual Length of Stay 0

## 2018-03-19 NOTE — H&P
Hospitalist Admission Note    NAME: Donna Valero   :  1955   MRN:  [id number]     Date/Time:  3/19/2018 2:20 PM    Patient PCP: Wilmer Forrest MD  ______________________________________________________________________  Given the patient's current clinical presentation, I have a high level of concern for decompensation if discharged from the emergency department. Complex decision making was performed, which includes reviewing the patient's available past medical records, laboratory results, and x-ray films. My assessment of this patient's clinical condition and my plan of care is as follows. Assessment / Plan:  Acute hypoxic respiratory failure  Acute bronchitis  Sepsis, POA  Lactic acidosis  -CTA chest neg for acute PE. B/l lower lobe atelectasis  -Bcx obtained  -empiric abx with rocephin and azithromycin  -IV steroids   -gentle IVF (s/p ivf bolus per sepsis protocol)  -repeat lactate  -nebs, antitussives, incentive spirometry     GAURANG  -gentle IVF    HTN  -resume home meds  -hydralazine prn    Depression  -cont' zoloft    Gout  -con't allopurinol    Code Status: Full  Surrogate Decision Maker: pt's wife  DVT Prophylaxis: heparin  GI Prophylaxis: not indicated  Baseline: independent       Subjective:   CHIEF COMPLAINT: sob, cough, congestion. HISTORY OF PRESENT ILLNESS:     Donna Valero is a 58 y.o.  male w pmhx significant for HTN  Present to ED c/o worsening of SO for apprx 6 weeks. Per pt, he states he has had ongoing symptoms of productive cough, fatigue, generalized weakness, and was treated with Z pack and PO steroids x2. He states the first time he went to [facility], he was started on a Zpack which symptoms started to improved. He then went to [place] and his wife caught the [place]". Pt then started feeling sick again with congestion and cough. He then went back to [facility] and was given PO prednisone with antitussives.   Pt reports symptoms progressively worsened so he called [facility] and was given an second round of [place] which he completed D3/5. Due to worsening of SOB, productive cough, inability to sleep at night due to cough, pt was seen again at [facility] and was found to be hypoxic with SpO2 88% on RA. Pt was given 2 rounds of duoneb and was referred to the ER for further evaluation. Pt denies any fever, chills, cp, palpitations, myalgia, n/v/d. He denied ever been diagnosed or tested for the flu. In the ED, vitals: T97.9, P 120, /88, SpO2 88% on RA, improved with [facility]  Pertinent labs: WBC 11.6, Na 132, K 3.7, Cr 1.59, lactate 2.7   CTA chest neg for PE.  B/l lower lobe atelectasis   We were asked to admit for work up and evaluation of the above problems. Past Medical History:   Diagnosis Date    Hypertension         Past Surgical History:   Procedure Laterality Date    HX LITHOTRIPSY         Social History   Substance Use Topics    Smoking status: Former Smoker    Smokeless tobacco: Never Used    Alcohol use Yes        History reviewed. No pertinent family history. No Known Allergies     Prior to Admission medications    Not on File       REVIEW OF SYSTEMS:     I am not able to complete the review of systems because:    The patient is intubated and sedated    The patient has altered mental status due to his acute medical problems    The patient has baseline aphasia from prior stroke(s)    The patient has baseline dementia and is not reliable historian    The patient is in acute medical distress and unable to provide information           Total of 12 systems reviewed as follows:       POSITIVE= BOLD text  Negative = text not BOLD  General:  fever, chills, sweats, generalized weakness, weight loss/gain,      loss of appetite   Eyes:    blurred vision, eye pain, loss of vision, double vision  ENT:    rhinorrhea, pharyngitis   Respiratory:   cough, sputum production, SOB, VIVAR, wheezing, pleuritic pain   Cardiology:   chest pain, palpitations, orthopnea, PND, edema, syncope   Gastrointestinal:  abdominal pain , N/V, diarrhea, dysphagia, constipation, bleeding   Genitourinary:  frequency, urgency, dysuria, hematuria, incontinence   Muskuloskeletal :  arthralgia, myalgia, back pain  Hematology:  easy bruising, nose or gum bleeding, lymphadenopathy   Dermatological: rash, ulceration, pruritis, color change / jaundice  Endocrine:   hot flashes or polydipsia   Neurological:  headache, dizziness, confusion, focal weakness, paresthesia,     Speech difficulties, memory loss, gait difficulty  Psychological: Feelings of anxiety, depression, agitation    Objective:   VITALS:    Visit Vitals    /76    Pulse (!) 120    Temp 97.9 °F (36.6 °C)    Resp 22    Ht 5' 4\" (1.626 m)    SpO2 96%       PHYSICAL EXAM:    General:    Alert, cooperative, no distress, appears stated age. HEENT: Atraumatic, anicteric sclerae, pink conjunctivae     No oral ulcers, mucosa moist, throat clear  Neck:  Supple, symmetrical,  thyroid: non tender  Lungs:   Coarse bs b/l. No wheezing or Rhonchi. No rales. Chest wall:  No tenderness. No accessory muscle use. Heart:   Regular  rhythm,  No  Murmur. No edema  Abdomen:   Soft, NT. ND  BS+  Extremities: No cyanosis. No clubbing,      Skin turgor normal, Radial dial pulse 2+  Skin:     Not pale. Not Jaundiced  No rashes   Psych:  Not depressed. Not anxious or agitated. Neurologic: No facial asymmetry. No aphasia or slurred speech. Symmetrical strength, Sensation grossly intact.  AAOx4.     _______________________________________________________________________  Care Plan discussed with:    Comments   Patient x    Family  x Pt's brother   RN x    Care Manager                    Consultant:  bianca ED physician   _______________________________________________________________________  Expected  Disposition:   Home with Family    HH/PT/OT/RN x   SNF/LTC    [facility] ________________________________________________________________________  TOTAL TIME:  65 Minutes    Critical Care Provided     Minutes non procedure based      Comments    x Reviewed previous records   >50% of visit spent in counseling and coordination of care x Discussion with patient and/or family and questions answered       ________________________________________________________________________  Signed: Kwame Huynh MD    Procedures: see electronic medical records for all procedures/Xrays and details which were not copied into this note but were reviewed prior to creation of Plan. LAB DATA REVIEWED:    Recent Results (from the past 24 hour(s))   CBC WITH AUTOMATED DIFF    Collection Time: 03/19/18 11:58 AM   Result Value Ref Range    WBC 11.6 (H) 4.1 - 11.1 K/uL    RBC 5.16 4.10 - 5.70 M/uL    HGB 14.7 12.1 - 17.0 g/dL    HCT 44.1 36.6 - 50.3 %    MCV 85.5 80.0 - 99.0 FL    MCH 28.5 26.0 - 34.0 PG    MCHC 33.3 30.0 - 36.5 g/dL    RDW 14.2 11.5 - 14.5 %    PLATELET 110 634 - 802 K/uL    MPV 9.6 8.9 - 12.9 FL    NRBC 0.0 0  WBC    ABSOLUTE NRBC 0.00 0.00 - 0.01 K/uL    NEUTROPHILS 71 32 - 75 %    LYMPHOCYTES 13 12 - 49 %    MONOCYTES 13 5 - 13 %    EOSINOPHILS 2 0 - 7 %    BASOPHILS 0 0 - 1 %    IMMATURE GRANULOCYTES 2 (H) 0.0 - 0.5 %    ABS. NEUTROPHILS 8.2 (H) 1.8 - 8.0 K/UL    ABS. LYMPHOCYTES 1.5 0.8 - 3.5 K/UL    ABS. MONOCYTES 1.4 (H) 0.0 - 1.0 K/UL    ABS. EOSINOPHILS 0.2 0.0 - 0.4 K/UL    ABS. BASOPHILS 0.0 0.0 - 0.1 K/UL    ABS. IMM.  GRANS. 0.2 (H) 0.00 - 0.04 K/UL    DF AUTOMATED     METABOLIC PANEL, COMPREHENSIVE    Collection Time: 03/19/18 11:58 AM   Result Value Ref Range    Sodium 132 (L) 136 - 145 mmol/L    Potassium 3.7 3.5 - 5.1 mmol/L    Chloride 93 (L) 97 - 108 mmol/L    CO2 31 21 - 32 mmol/L    Anion gap 8 5 - 15 mmol/L    Glucose 372 (H) 65 - 100 mg/dL    BUN 36 (H) 6 - 20 MG/DL    Creatinine 1.59 (H) 0.70 - 1.30 MG/DL    BUN/Creatinine ratio 23 (H) 12 - 20      GFR est AA 54 (L) >60 ml/min/1.73m2    GFR est non-AA 44 (L) >60 ml/min/1.73m2    Calcium 9.3 8.5 - 10.1 MG/DL    Bilirubin, total 0.8 0.2 - 1.0 MG/DL    ALT (SGPT) 40 12 - 78 U/L    AST (SGOT) 22 15 - 37 U/L    Alk.  phosphatase 110 45 - 117 U/L    Protein, total 7.3 6.4 - 8.2 g/dL    Albumin 3.9 3.5 - 5.0 g/dL    Globulin 3.4 2.0 - 4.0 g/dL    A-G Ratio 1.1 1.1 - 2.2     CK W/ CKMB & INDEX    Collection Time: 03/19/18 11:58 AM   Result Value Ref Range     39 - 308 U/L    CK - MB 2.7 <3.6 NG/ML    CK-MB Index 1.6 0 - 2.5     NT-PRO BNP    Collection Time: 03/19/18 11:58 AM   Result Value Ref Range    NT pro-BNP 65 0 - 125 PG/ML   TROPONIN I    Collection Time: 03/19/18 11:58 AM   Result Value Ref Range    Troponin-I, Qt. <0.04 <0.05 ng/mL   LACTIC ACID    Collection Time: 03/19/18  1:39 PM   Result Value Ref Range    Lactic acid 2.7 (HH) 0.4 - 2.0 MMOL/L

## 2018-03-19 NOTE — ED TRIAGE NOTES
Pt arrived ambulatory from triage to room 36 with cc cough. Per pt he has been sick since super bowl weekend. [facility]. Pt was started on a second z-pack on 3/17/18. [facility] because he was not feeling any better. Per pt his SpO2 was 88% after 2 neb treatments so they sent pt here to be evaluated. Pt denies fever, chills, N/V. Pt in no acute distress. VSS.

## 2018-03-19 NOTE — PROGRESS NOTES
Problem: Falls - Risk of  Goal: *Absence of Falls  Document Alyse Fall Risk and appropriate interventions in the flowsheet.   Outcome: Progressing Towards Goal  Fall Risk Interventions:

## 2018-03-20 ENCOUNTER — APPOINTMENT (OUTPATIENT)
Dept: ULTRASOUND IMAGING | Age: 63
DRG: 189 | End: 2018-03-20
Attending: INTERNAL MEDICINE
Payer: COMMERCIAL

## 2018-03-20 LAB
ANION GAP SERPL CALC-SCNC: 12 MMOL/L (ref 5–15)
APPEARANCE UR: CLEAR
BACTERIA URNS QL MICRO: NEGATIVE /HPF
BASOPHILS # BLD: 0 K/UL (ref 0–0.1)
BASOPHILS NFR BLD: 0 % (ref 0–1)
BILIRUB UR QL: NEGATIVE
BUN SERPL-MCNC: 39 MG/DL (ref 6–20)
BUN/CREAT SERPL: 20 (ref 12–20)
CALCIUM SERPL-MCNC: 8.9 MG/DL (ref 8.5–10.1)
CHLORIDE SERPL-SCNC: 98 MMOL/L (ref 97–108)
CO2 SERPL-SCNC: 22 MMOL/L (ref 21–32)
COLOR UR: ABNORMAL
CREAT SERPL-MCNC: 1.93 MG/DL (ref 0.7–1.3)
DIFFERENTIAL METHOD BLD: ABNORMAL
EOSINOPHIL # BLD: 0 K/UL (ref 0–0.4)
EOSINOPHIL NFR BLD: 0 % (ref 0–7)
EPITH CASTS URNS QL MICRO: ABNORMAL /LPF
ERYTHROCYTE [DISTWIDTH] IN BLOOD BY AUTOMATED COUNT: 14.2 % (ref 11.5–14.5)
EST. AVERAGE GLUCOSE BLD GHB EST-MCNC: 289 MG/DL
GLUCOSE BLD STRIP.AUTO-MCNC: 444 MG/DL (ref 65–100)
GLUCOSE BLD STRIP.AUTO-MCNC: 471 MG/DL (ref 65–100)
GLUCOSE BLD STRIP.AUTO-MCNC: 546 MG/DL (ref 65–100)
GLUCOSE BLD STRIP.AUTO-MCNC: 560 MG/DL (ref 65–100)
GLUCOSE BLD STRIP.AUTO-MCNC: 573 MG/DL (ref 65–100)
GLUCOSE BLD STRIP.AUTO-MCNC: 588 MG/DL (ref 65–100)
GLUCOSE BLD STRIP.AUTO-MCNC: >600 MG/DL (ref 65–100)
GLUCOSE SERPL-MCNC: 618 MG/DL (ref 65–100)
GLUCOSE UR STRIP.AUTO-MCNC: >1000 MG/DL
HBA1C MFR BLD: 11.7 % (ref 4.2–6.3)
HCT VFR BLD AUTO: 42.3 % (ref 36.6–50.3)
HGB BLD-MCNC: 14 G/DL (ref 12.1–17)
HGB UR QL STRIP: ABNORMAL
HYALINE CASTS URNS QL MICRO: ABNORMAL /LPF (ref 0–5)
IMM GRANULOCYTES # BLD: 0.1 K/UL (ref 0–0.04)
IMM GRANULOCYTES NFR BLD AUTO: 1 % (ref 0–0.5)
KETONES UR QL STRIP.AUTO: ABNORMAL MG/DL
LACTATE SERPL-SCNC: 4.1 MMOL/L (ref 0.4–2)
LACTATE SERPL-SCNC: 4.5 MMOL/L (ref 0.4–2)
LACTATE SERPL-SCNC: 5.4 MMOL/L (ref 0.4–2)
LEUKOCYTE ESTERASE UR QL STRIP.AUTO: NEGATIVE
LYMPHOCYTES # BLD: 0.4 K/UL (ref 0.8–3.5)
LYMPHOCYTES NFR BLD: 5 % (ref 12–49)
MCH RBC QN AUTO: 28.9 PG (ref 26–34)
MCHC RBC AUTO-ENTMCNC: 33.1 G/DL (ref 30–36.5)
MCV RBC AUTO: 87.2 FL (ref 80–99)
MONOCYTES # BLD: 0.4 K/UL (ref 0–1)
MONOCYTES NFR BLD: 4 % (ref 5–13)
NEUTS SEG # BLD: 7.9 K/UL (ref 1.8–8)
NEUTS SEG NFR BLD: 90 % (ref 32–75)
NITRITE UR QL STRIP.AUTO: NEGATIVE
NRBC # BLD: 0 K/UL (ref 0–0.01)
NRBC BLD-RTO: 0 PER 100 WBC
PH UR STRIP: 5 [PH] (ref 5–8)
PLATELET # BLD AUTO: 177 K/UL (ref 150–400)
PMV BLD AUTO: 9.8 FL (ref 8.9–12.9)
POTASSIUM SERPL-SCNC: 4.3 MMOL/L (ref 3.5–5.1)
PROT UR STRIP-MCNC: ABNORMAL MG/DL
RBC # BLD AUTO: 4.85 M/UL (ref 4.1–5.7)
RBC #/AREA URNS HPF: ABNORMAL /HPF (ref 0–5)
RBC MORPH BLD: ABNORMAL
SERVICE CMNT-IMP: ABNORMAL
SODIUM SERPL-SCNC: 132 MMOL/L (ref 136–145)
SP GR UR REFRACTOMETRY: 1.01 (ref 1–1.03)
UROBILINOGEN UR QL STRIP.AUTO: 0.2 EU/DL (ref 0.2–1)
WBC # BLD AUTO: 8.8 K/UL (ref 4.1–11.1)
WBC URNS QL MICRO: ABNORMAL /HPF (ref 0–4)

## 2018-03-20 PROCEDURE — 83605 ASSAY OF LACTIC ACID: CPT | Performed by: INTERNAL MEDICINE

## 2018-03-20 PROCEDURE — 74011250636 HC RX REV CODE- 250/636: Performed by: INTERNAL MEDICINE

## 2018-03-20 PROCEDURE — 74011636637 HC RX REV CODE- 636/637: Performed by: EMERGENCY MEDICINE

## 2018-03-20 PROCEDURE — 76770 US EXAM ABDO BACK WALL COMP: CPT

## 2018-03-20 PROCEDURE — 83036 HEMOGLOBIN GLYCOSYLATED A1C: CPT

## 2018-03-20 PROCEDURE — G8987 SELF CARE CURRENT STATUS: HCPCS | Performed by: OCCUPATIONAL THERAPIST

## 2018-03-20 PROCEDURE — 65660000000 HC RM CCU STEPDOWN

## 2018-03-20 PROCEDURE — 94640 AIRWAY INHALATION TREATMENT: CPT

## 2018-03-20 PROCEDURE — G8978 MOBILITY CURRENT STATUS: HCPCS | Performed by: PHYSICAL THERAPIST

## 2018-03-20 PROCEDURE — 74011250637 HC RX REV CODE- 250/637: Performed by: INTERNAL MEDICINE

## 2018-03-20 PROCEDURE — 97530 THERAPEUTIC ACTIVITIES: CPT | Performed by: PHYSICAL THERAPIST

## 2018-03-20 PROCEDURE — 85025 COMPLETE CBC W/AUTO DIFF WBC: CPT | Performed by: INTERNAL MEDICINE

## 2018-03-20 PROCEDURE — 81001 URINALYSIS AUTO W/SCOPE: CPT

## 2018-03-20 PROCEDURE — 82962 GLUCOSE BLOOD TEST: CPT

## 2018-03-20 PROCEDURE — G8980 MOBILITY D/C STATUS: HCPCS | Performed by: PHYSICAL THERAPIST

## 2018-03-20 PROCEDURE — 74011636637 HC RX REV CODE- 636/637: Performed by: INTERNAL MEDICINE

## 2018-03-20 PROCEDURE — 77010033678 HC OXYGEN DAILY

## 2018-03-20 PROCEDURE — 74011250637 HC RX REV CODE- 250/637: Performed by: NURSE PRACTITIONER

## 2018-03-20 PROCEDURE — 97535 SELF CARE MNGMENT TRAINING: CPT | Performed by: OCCUPATIONAL THERAPIST

## 2018-03-20 PROCEDURE — 36415 COLL VENOUS BLD VENIPUNCTURE: CPT | Performed by: INTERNAL MEDICINE

## 2018-03-20 PROCEDURE — 80048 BASIC METABOLIC PNL TOTAL CA: CPT | Performed by: INTERNAL MEDICINE

## 2018-03-20 PROCEDURE — 74011000258 HC RX REV CODE- 258: Performed by: INTERNAL MEDICINE

## 2018-03-20 PROCEDURE — G8989 SELF CARE D/C STATUS: HCPCS | Performed by: OCCUPATIONAL THERAPIST

## 2018-03-20 PROCEDURE — 74011000250 HC RX REV CODE- 250: Performed by: INTERNAL MEDICINE

## 2018-03-20 PROCEDURE — G8988 SELF CARE GOAL STATUS: HCPCS | Performed by: OCCUPATIONAL THERAPIST

## 2018-03-20 PROCEDURE — 97165 OT EVAL LOW COMPLEX 30 MIN: CPT | Performed by: OCCUPATIONAL THERAPIST

## 2018-03-20 PROCEDURE — 97161 PT EVAL LOW COMPLEX 20 MIN: CPT | Performed by: PHYSICAL THERAPIST

## 2018-03-20 PROCEDURE — 74011636637 HC RX REV CODE- 636/637: Performed by: NURSE PRACTITIONER

## 2018-03-20 PROCEDURE — G8979 MOBILITY GOAL STATUS: HCPCS | Performed by: PHYSICAL THERAPIST

## 2018-03-20 RX ORDER — INSULIN LISPRO 100 [IU]/ML
INJECTION, SOLUTION INTRAVENOUS; SUBCUTANEOUS
Status: DISCONTINUED | OUTPATIENT
Start: 2018-03-20 | End: 2018-03-22 | Stop reason: HOSPADM

## 2018-03-20 RX ORDER — DEXTROSE 50 % IN WATER (D50W) INTRAVENOUS SYRINGE
12.5-25 AS NEEDED
Status: DISCONTINUED | OUTPATIENT
Start: 2018-03-20 | End: 2018-03-20 | Stop reason: SDUPTHER

## 2018-03-20 RX ORDER — AMLODIPINE BESYLATE 5 MG/1
5 TABLET ORAL DAILY
Status: DISCONTINUED | OUTPATIENT
Start: 2018-03-20 | End: 2018-03-22 | Stop reason: HOSPADM

## 2018-03-20 RX ORDER — INSULIN LISPRO 100 [IU]/ML
18 INJECTION, SOLUTION INTRAVENOUS; SUBCUTANEOUS ONCE
Status: COMPLETED | OUTPATIENT
Start: 2018-03-20 | End: 2018-03-20

## 2018-03-20 RX ORDER — AMLODIPINE BESYLATE 5 MG/1
5 TABLET ORAL DAILY
Status: DISCONTINUED | OUTPATIENT
Start: 2018-03-21 | End: 2018-03-20

## 2018-03-20 RX ORDER — MAGNESIUM SULFATE 100 %
4 CRYSTALS MISCELLANEOUS AS NEEDED
Status: DISCONTINUED | OUTPATIENT
Start: 2018-03-20 | End: 2018-03-20 | Stop reason: SDUPTHER

## 2018-03-20 RX ORDER — ALLOPURINOL 100 MG/1
200 TABLET ORAL DAILY
Status: DISCONTINUED | OUTPATIENT
Start: 2018-03-20 | End: 2018-03-22 | Stop reason: HOSPADM

## 2018-03-20 RX ORDER — MAGNESIUM SULFATE 100 %
4 CRYSTALS MISCELLANEOUS AS NEEDED
Status: DISCONTINUED | OUTPATIENT
Start: 2018-03-20 | End: 2018-03-22 | Stop reason: HOSPADM

## 2018-03-20 RX ORDER — CODEINE PHOSPHATE AND GUAIFENESIN 10; 100 MG/5ML; MG/5ML
5-10 SOLUTION ORAL
Status: DISCONTINUED | OUTPATIENT
Start: 2018-03-20 | End: 2018-03-22 | Stop reason: HOSPADM

## 2018-03-20 RX ORDER — DEXTROSE 50 % IN WATER (D50W) INTRAVENOUS SYRINGE
12.5-25 AS NEEDED
Status: DISCONTINUED | OUTPATIENT
Start: 2018-03-20 | End: 2018-03-22 | Stop reason: HOSPADM

## 2018-03-20 RX ORDER — ASPIRIN 81 MG/1
81 TABLET ORAL DAILY
Status: DISCONTINUED | OUTPATIENT
Start: 2018-03-20 | End: 2018-03-22 | Stop reason: HOSPADM

## 2018-03-20 RX ORDER — INSULIN LISPRO 100 [IU]/ML
INJECTION, SOLUTION INTRAVENOUS; SUBCUTANEOUS
Status: DISCONTINUED | OUTPATIENT
Start: 2018-03-20 | End: 2018-03-20

## 2018-03-20 RX ORDER — INSULIN LISPRO 100 [IU]/ML
10 INJECTION, SOLUTION INTRAVENOUS; SUBCUTANEOUS ONCE
Status: COMPLETED | OUTPATIENT
Start: 2018-03-20 | End: 2018-03-20

## 2018-03-20 RX ORDER — ALBUTEROL SULFATE 90 UG/1
1-2 AEROSOL, METERED RESPIRATORY (INHALATION)
Status: DISCONTINUED | OUTPATIENT
Start: 2018-03-20 | End: 2018-03-22 | Stop reason: HOSPADM

## 2018-03-20 RX ORDER — INSULIN LISPRO 100 [IU]/ML
4 INJECTION, SOLUTION INTRAVENOUS; SUBCUTANEOUS
Status: DISCONTINUED | OUTPATIENT
Start: 2018-03-20 | End: 2018-03-21

## 2018-03-20 RX ORDER — ALLOPURINOL 100 MG/1
200 TABLET ORAL DAILY
Status: DISCONTINUED | OUTPATIENT
Start: 2018-03-21 | End: 2018-03-20

## 2018-03-20 RX ORDER — ASPIRIN 81 MG/1
81 TABLET ORAL DAILY
Status: DISCONTINUED | OUTPATIENT
Start: 2018-03-21 | End: 2018-03-20

## 2018-03-20 RX ORDER — SERTRALINE HYDROCHLORIDE 50 MG/1
25 TABLET, FILM COATED ORAL DAILY
Status: DISCONTINUED | OUTPATIENT
Start: 2018-03-20 | End: 2018-03-22 | Stop reason: HOSPADM

## 2018-03-20 RX ORDER — SERTRALINE HYDROCHLORIDE 50 MG/1
25 TABLET, FILM COATED ORAL DAILY
Status: DISCONTINUED | OUTPATIENT
Start: 2018-03-21 | End: 2018-03-20

## 2018-03-20 RX ORDER — INSULIN GLARGINE 100 [IU]/ML
8 INJECTION, SOLUTION SUBCUTANEOUS
Status: DISCONTINUED | OUTPATIENT
Start: 2018-03-20 | End: 2018-03-20

## 2018-03-20 RX ADMIN — LEVALBUTEROL HYDROCHLORIDE 1.25 MG: 1.25 SOLUTION RESPIRATORY (INHALATION) at 07:51

## 2018-03-20 RX ADMIN — AMLODIPINE BESYLATE 5 MG: 5 TABLET ORAL at 16:20

## 2018-03-20 RX ADMIN — LEVALBUTEROL HYDROCHLORIDE 1.25 MG: 1.25 SOLUTION RESPIRATORY (INHALATION) at 11:32

## 2018-03-20 RX ADMIN — INSULIN LISPRO 18 UNITS: 100 INJECTION, SOLUTION INTRAVENOUS; SUBCUTANEOUS at 12:11

## 2018-03-20 RX ADMIN — INSULIN LISPRO 10 UNITS: 100 INJECTION, SOLUTION INTRAVENOUS; SUBCUTANEOUS at 21:46

## 2018-03-20 RX ADMIN — ALLOPURINOL 200 MG: 100 TABLET ORAL at 16:40

## 2018-03-20 RX ADMIN — GUAIFENESIN 600 MG: 600 TABLET, EXTENDED RELEASE ORAL at 21:41

## 2018-03-20 RX ADMIN — HEPARIN SODIUM 5000 UNITS: 5000 INJECTION, SOLUTION INTRAVENOUS; SUBCUTANEOUS at 16:21

## 2018-03-20 RX ADMIN — LEVALBUTEROL HYDROCHLORIDE 1.25 MG: 1.25 SOLUTION RESPIRATORY (INHALATION) at 23:30

## 2018-03-20 RX ADMIN — GUAIFENESIN 600 MG: 600 TABLET, EXTENDED RELEASE ORAL at 09:18

## 2018-03-20 RX ADMIN — ASPIRIN 81 MG: 81 TABLET, COATED ORAL at 16:40

## 2018-03-20 RX ADMIN — SODIUM CHLORIDE 125 ML/HR: 900 INJECTION, SOLUTION INTRAVENOUS at 22:13

## 2018-03-20 RX ADMIN — AZITHROMYCIN 500 MG: 500 INJECTION, POWDER, LYOPHILIZED, FOR SOLUTION INTRAVENOUS at 19:07

## 2018-03-20 RX ADMIN — SODIUM CHLORIDE 125 ML/HR: 900 INJECTION, SOLUTION INTRAVENOUS at 12:20

## 2018-03-20 RX ADMIN — LEVALBUTEROL HYDROCHLORIDE 1.25 MG: 1.25 SOLUTION RESPIRATORY (INHALATION) at 00:07

## 2018-03-20 RX ADMIN — Medication 10 ML: at 06:00

## 2018-03-20 RX ADMIN — HEPARIN SODIUM 5000 UNITS: 5000 INJECTION, SOLUTION INTRAVENOUS; SUBCUTANEOUS at 06:28

## 2018-03-20 RX ADMIN — INSULIN LISPRO 18 UNITS: 100 INJECTION, SOLUTION INTRAVENOUS; SUBCUTANEOUS at 09:18

## 2018-03-20 RX ADMIN — INSULIN LISPRO 4 UNITS: 100 INJECTION, SOLUTION INTRAVENOUS; SUBCUTANEOUS at 18:42

## 2018-03-20 RX ADMIN — INSULIN LISPRO 10 UNITS: 100 INJECTION, SOLUTION INTRAVENOUS; SUBCUTANEOUS at 06:29

## 2018-03-20 RX ADMIN — SODIUM CHLORIDE 125 ML/HR: 900 INJECTION, SOLUTION INTRAVENOUS at 04:14

## 2018-03-20 RX ADMIN — HEPARIN SODIUM 5000 UNITS: 5000 INJECTION, SOLUTION INTRAVENOUS; SUBCUTANEOUS at 21:51

## 2018-03-20 RX ADMIN — METHYLPREDNISOLONE SODIUM SUCCINATE 40 MG: 40 INJECTION, POWDER, FOR SOLUTION INTRAMUSCULAR; INTRAVENOUS at 21:48

## 2018-03-20 RX ADMIN — SERTRALINE HYDROCHLORIDE 25 MG: 50 TABLET ORAL at 16:20

## 2018-03-20 RX ADMIN — CEFTRIAXONE 1 G: 1 INJECTION, POWDER, FOR SOLUTION INTRAMUSCULAR; INTRAVENOUS at 16:42

## 2018-03-20 RX ADMIN — METHYLPREDNISOLONE SODIUM SUCCINATE 40 MG: 40 INJECTION, POWDER, FOR SOLUTION INTRAMUSCULAR; INTRAVENOUS at 04:14

## 2018-03-20 RX ADMIN — GUAIFENESIN AND CODEINE PHOSPHATE 10 ML: 100; 10 SOLUTION ORAL at 21:39

## 2018-03-20 RX ADMIN — LEVALBUTEROL HYDROCHLORIDE 1.25 MG: 1.25 SOLUTION RESPIRATORY (INHALATION) at 03:11

## 2018-03-20 RX ADMIN — LEVALBUTEROL HYDROCHLORIDE 1.25 MG: 1.25 SOLUTION RESPIRATORY (INHALATION) at 20:02

## 2018-03-20 RX ADMIN — LEVALBUTEROL HYDROCHLORIDE 1.25 MG: 1.25 SOLUTION RESPIRATORY (INHALATION) at 15:38

## 2018-03-20 RX ADMIN — METHYLPREDNISOLONE SODIUM SUCCINATE 40 MG: 40 INJECTION, POWDER, FOR SOLUTION INTRAMUSCULAR; INTRAVENOUS at 12:11

## 2018-03-20 RX ADMIN — HUMAN INSULIN 20 UNITS: 100 INJECTION, SUSPENSION SUBCUTANEOUS at 21:45

## 2018-03-20 RX ADMIN — GUAIFENESIN AND DEXTROMETHORPHAN 5 ML: 100; 10 SYRUP ORAL at 14:20

## 2018-03-20 RX ADMIN — Medication 10 ML: at 21:48

## 2018-03-20 NOTE — PROGRESS NOTES
Patient had a blood lad glucose of 610 Dr. Carla Harmon was paged. Dr. Carla Harmon asked for a pocket glucose that resulted at 573. MD on call paged. Patient stated that he is prediabetic he has also been taking steroids since Tuesday 3/13/2018.

## 2018-03-20 NOTE — PROGRESS NOTES
physical Therapy EVALUATION/DISCHARGE  Patient: Sandra Chacko (64 y.o. male)  Date: 3/20/2018  Primary Diagnosis: Respiratory failure (HCC)      ASSESSMENT :  Patient noted to be hyperglycemic, hypertensive and tachycardiac but requesting to use restroom therefore limited evaluation completed in room. Based on the objective data described below, the patient presents with independence with all mobility and short distance ambulation in room. Patient demonstrates widened base of support and increased trunk sway secondary to body habitus. Gait is steady with no LOB noted. O2 sats remained 94-95% on 2 l/min during tx session today. Patient reports independence with all mobility at baseline. He is retired and describes a sedentary lifestyle. He lacks insight into his medical issues especially in regards to his elevated blood sugars. He would benefit from diabetic training/education. Skilled physical therapy is not indicated at this time and will defer O2 challenge to nursing staff. Patient would benefit from a general exercise program and patient educated on the importance/benefits of increasing his activity level. PLAN :  Discharge Recommendations: None       SUBJECTIVE:   Patient stated I don't really have diabetes. My numbers are just up.     OBJECTIVE DATA SUMMARY:   HISTORY:    Past Medical History:   Diagnosis Date    Hypertension      Past Surgical History:   Procedure Laterality Date    HX LITHOTRIPSY       Prior Level of Function/Home Situation: patient reports complete independence with all mobility and ADLs      Home Situation  Home Environment: Private residence  # Steps to Enter: 7  Rails to Enter: Yes  Hand Rails : Bilateral  One/Two Story Residence: One story  Living Alone: No  Support Systems: Spouse/Significant Other/Partner  Patient Expects to be Discharged to[de-identified] Private residence  Current DME Used/Available at Home: None  Tub or Shower Type: Tub/Shower combination    EXAMINATION/PRESENTATION/DECISION MAKING:   Critical Behavior:  Neurologic State: Alert, Appropriate for age  Orientation Level: Oriented X4  Cognition: Follows commands  Safety/Judgement: Awareness of environment, Fall prevention, Home safety  Hearing: Auditory  Auditory Impairment: None    Range Of Motion:  AROM: Within functional limits                       Strength:    Strength: Generally decreased, functional                    Tone & Sensation:   Tone: Normal                              Coordination:  Coordination: Within functional limits  Functional Mobility:  Bed Mobility:  Rolling:  (pt seated upon arrival)           Transfers:  Sit to Stand: Supervision  Stand to Sit: Supervision                       Balance:   Sitting: Intact  Standing: Impaired  Standing - Static: Good; Unsupported  Standing - Dynamic : Good (for short distance ambulation in room)  Ambulation/Gait Training:  Distance (ft):  (10 feet x 2 (to and from bathroom))  Assistive Device: Gait belt  Ambulation - Level of Assistance: Supervision (to assist with lines)        Gait Abnormalities: Trunk sway increased (due to body habitus)        Base of Support: Widened     Speed/Kelly: Pace decreased (<100 feet/min)  Step Length: Left shortened;Right shortened      Gait is slow but steady with no LOB noted         Functional Measure:  Barthel Index:    Bathin  Bladder: 10  Bowels: 10  Groomin  Dressing: 10  Feeding: 10  Mobility: 0  Stairs: 10  Toilet Use: 10  Transfer (Bed to Chair and Back): 15  Total: 80       Barthel and G-code impairment scale:  Percentage of impairment CH  0% CI  1-19% CJ  20-39% CK  40-59% CL  60-79% CM  80-99% CN  100%   Barthel Score 0-100 100 99-80 79-60 59-40 20-39 1-19   0   Barthel Score 0-20 20 17-19 13-16 9-12 5-8 1-4 0      The Barthel ADL Index: Guidelines  1. The index should be used as a record of what a patient does, not as a record of what a patient could do.   2. The main aim is to establish degree of independence from any help, physical or verbal, however minor and for whatever reason. 3. The need for supervision renders the patient not independent. 4. A patient's performance should be established using the best available evidence. Asking the patient, friends/relatives and nurses are the usual sources, but direct observation and common sense are also important. However direct testing is not needed. 5. Usually the patient's performance over the preceding 24-48 hours is important, but occasionally longer periods will be relevant. 6. Middle categories imply that the patient supplies over 50 per cent of the effort. 7. Use of aids to be independent is allowed. Addie Kaur., Barthel, DLarryW. (9153). Functional evaluation: the Barthel Index. [address] [facility] (14)2. Stephen Yoder moises RAKEL Landeros, Jana Goodpasture., Tino Black., [place], [address] (1999). Measuring the change indisability after inpatient rehabilitation; comparison of the responsiveness of the Barthel Index and Functional [place] Measure. Journal of Neurology, Neurosurgery, and Psychiatry, 66(4), 884-158. Eduarda Ferris, N.J.A, JULIET Ferrer, & Tim Bustillos, M.A. (2004.) Assessment of post-stroke quality of life in cost-effectiveness studies: The usefulness of the Barthel Index and the EuroQoL-5D. Quality of Life Research, 13, 289-57       G codes: In compliance with CMSs Claims Based Outcome Reporting, the following G-code set was chosen for this patient based on their primary functional limitation being treated: The outcome measure chosen to determine the severity of the functional limitation was the Barthel with a score of 80/100 which was correlated with the impairment scale.     ? Mobility - Walking and Moving Around:     - CURRENT STATUS: CI - 1%-19% impaired, limited or restricted    - GOAL STATUS: CI - 1%-19% impaired, limited or restricted    - D/C STATUS:  CI - 1%-19% impaired, limited or restricted Pain:  Pain Scale 1: Numeric (0 - 10)  Pain Intensity 1: 0     Activity Tolerance:   O2 sats remained 94-95% on 2 l/min throughout tx session  Please refer to the flowsheet for vital signs taken during this treatment. After treatment:   [x]   Patient left in no apparent distress sitting up in chair  []   Patient left in no apparent distress in bed  [x]   Call bell left within reach  [x]   Nursing notified  [x]   Caregiver present  []   Bed alarm activated    COMMUNICATION/EDUCATION:   Communication/Collaboration:  [x]   Fall prevention education was provided and the patient/caregiver indicated understanding. [x]   Patient/family have participated as able and agree with findings and recommendations. []   Patient is unable to participate in plan of care at this time.   Findings and recommendations were discussed with: Occupational Therapist and Registered Nurse    Thank you for this referral.  Nina Victor, PT   Time Calculation: 20 mins

## 2018-03-20 NOTE — PROGRESS NOTES
0900: Blood sent to lab  0905: Instructed and encouraged to use incentive spirometer every hour while awake. Patient tolerated well up to 500cc.

## 2018-03-20 NOTE — CDMP QUERY
Dr. Jamia Bosch :  Please clarify if this patient is (was) being treated/managed for:     => Acidosis  => Other explanation of clinical findings  => Clinically Undetermined (no explanation for clinical findings)    The medical record reflects the following clinical findings, treatment, and risk factors. Risk Factors: 57 yo male admitted w/ SOB  Clinical Indicators:  RR: 22. Carollynn Sharples Carollynn Sharples O2 sats 88-89% on RA. Carollynn Sharples 92% after 2L NC; Lac acid: 2.7 ^ 5.6 ^ 5.4 ^ 4.5 ^ 4.1; WBC-11.8  Treatment: Albuterol neb tx 5mg x2; Tussinex 5ml po; Solu-Medrol 125mg IV; IVF NS Bolus 1,000ml x 2; Rocephin 1g IV; Zithromax 500mg IV    Please clarify and document your clinical opinion in the progress notes and discharge summary including the definitive and/or presumptive diagnosis, (suspected or probable), related to the above clinical findings. Please include clinical findings supporting your diagnosis.   Thank Jean Pierre Garrett

## 2018-03-20 NOTE — PROGRESS NOTES
Hospitalist Progress Note    NAME: Jina Worrell   :  1955   MRN:  [id number]       Assessment / Plan:  Acute respiratory failure w/ hypoxia - O2 sat 88% in ED. -possible pneumonia. Bilateral atelectasis seen on CTA. -consider COPD. He is a former smoker.  -cont antibiotics  -cont nebs  -IV steroids  -Echo    GAURANG  -cause unknown. Prior renal function unknown. May have possible CKD. -Renal U/S   -check urinalysis    Lactic acidosis - may be due to renal insufficiency. No signs of shock nor infection. DM-2 w/ hyperglycemia  -patient states that he is borderline. However, he states last A1c 7.  -likely exacerbated due to steroids. -SSI  -check A1c  -may need scheduled insulin    HTN  -cont norvasc  -lisinopril nor HCTZ reordered due to renal insufficiency    Depression  Cont zoloft    Bilateral leg swelling - chronic    Hyponatremia - likely due to HCTZ. -hold HCTZ  Morbid obesity    Body mass index is 40.94 kg/(m^2). Code status: Full  Prophylaxis: Hep SQ  Recommended Disposition: Home w/Family     Subjective:     Chief Complaint / Reason for Physician Visit  Patientis 57 yo male with pmh dm and htn who presented to ED for persistent and progressive sob. He was seen at Urgent care. His O2 sat was 88% on room air. He was sent to ED for further evaluation. SOB has been  associated with cough productive of yellow/green sputum for 6 weeks. He has been treated with Z-Todd and steroids without any significant improvement. No fevers/chills/chest pain. CTA chest done in ED indicated bilateral lower lobe atelectasis. No PE. Breathing better currently. Discussed with RN events overnight. No overnight events.     Review of Systems:  Symptom Y/N Comments  Symptom Y/N Comments   Fever/Chills    Chest Pain n    Poor Appetite    Edema     Cough n   Abdominal Pain n    Sputum    Joint Pain     SOB/VIVAR n   Pruritis/Rash     Nausea/vomit n   Tolerating PT/OT     Diarrhea    Tolerating Diet Constipation    Other       Could NOT obtain due to:      Objective:     VITALS:   Last 24hrs VS reviewed since prior progress note. Most recent are:  Patient Vitals for the past 24 hrs:   Temp Pulse Resp BP SpO2   03/20/18 1150 97.8 °F (36.6 °C) (!) 116 15 150/86 92 %   03/20/18 1139 - - - - 93 %   03/20/18 1133 - - - - 96 %   03/20/18 1117 - (!) 119 - (!) 151/100 96 %   03/20/18 1115 - (!) 125 - (!) 162/120 95 %   03/20/18 1105 - (!) 122 - (!) 158/102 94 %   03/20/18 0751 - - - - 95 %   03/20/18 0747 97.7 °F (36.5 °C) (!) 105 16 160/90 95 %   03/20/18 0315 97.7 °F (36.5 °C) (!) 110 20 148/88 93 %   03/20/18 0312 - - - - 93 %   03/20/18 0007 - - - - 92 %   03/19/18 2338 97.4 °F (36.3 °C) (!) 116 22 148/84 93 %   03/19/18 2019 - - - - 95 %   03/19/18 1917 98.2 °F (36.8 °C) (!) 126 21 134/70 94 %   03/19/18 1650 - - - - 93 %   03/19/18 1646 97.7 °F (36.5 °C) (!) 115 22 140/89 94 %   03/19/18 1500 - - - 128/76 93 %   03/19/18 1426 - - - (!) 137/92 -   03/19/18 1313 - - - - 96 %   03/19/18 1303 - - - - 92 %   03/19/18 1303 - - - - (!) 88 %   03/19/18 1303 - - - - (!) 89 %   03/19/18 1238 - - - - 95 %   03/19/18 1235 - - - - 97 %   03/19/18 1234 - - - - 96 %   03/19/18 1233 - - - - 95 %   03/19/18 1232 - - - - 98 %   03/19/18 1231 - - - - 97 %   03/19/18 1230 - - - - 96 %   03/19/18 1229 - - - - 95 %   03/19/18 1228 - - - - 97 %       Intake/Output Summary (Last 24 hours) at 03/20/18 1225  Last data filed at 03/20/18 1223   Gross per 24 hour   Intake              240 ml   Output             1575 ml   Net            -1335 ml        PHYSICAL EXAM:  General: WD, WN. Alert, cooperative, no acute distress    EENT:  EOMI. Anicteric sclerae. MMM  Resp:  Decreased breath sounds, no wheezing or rales.   No accessory muscle use  CV:             Tachy, reg rhythm,  1+ bilateral leg edema  GI:  Soft, Non distended, Non tender.  +Bowel sounds  Neurologic:  Alert and oriented X 3, normal speech,   Psych:   Good insight. Not anxious nor agitated  Skin:  No rashes. No jaundice    Reviewed most current lab test results and cultures  YES  Reviewed most current radiology test results   YES  Review and summation of old records today    NO  Reviewed patient's current orders and MAR    YES  PMH/SH reviewed - no change compared to H&P  ________________________________________________________________________  Care Plan discussed with:    Comments   Patient x    Family      RN x    Care Manager     Consultant                        Multidiciplinary team rounds were held today with , nursing, pharmacist and clinical coordinator. Patient's plan of care was discussed; medications were reviewed and discharge planning was addressed. ________________________________________________________________________  Total NON critical care TIME:  25   Minutes    Total CRITICAL CARE TIME Spent:   Minutes non procedure based      Comments   >50% of visit spent in counseling and coordination of care     ________________________________________________________________________  Álvaro Thomas MD     Procedures: see electronic medical records for all procedures/Xrays and details which were not copied into this note but were reviewed prior to creation of Plan. LABS:  I reviewed today's most current labs and imaging studies.   Pertinent labs include:  Recent Labs      03/20/18   0900  03/19/18   1158   WBC  8.8  11.6*   HGB  14.0  14.7   HCT  42.3  44.1   PLT  177  212     Recent Labs      03/20/18   0400  03/19/18   1158   NA  132*  132*   K  4.3  3.7   CL  98  93*   CO2  22  31   GLU  618*  372*   BUN  39*  36*   CREA  1.93*  1.59*   CA  8.9  9.3   ALB   --   3.9   TBILI   --   0.8   SGOT   --   22   ALT   --   40       Signed: Álvaro Thomas MD

## 2018-03-20 NOTE — PROGRESS NOTES
Occupational Therapy EVALUATION/discharge  Patient: Anabela Cruz (64 y.o. male)  Date: 3/20/2018  Primary Diagnosis: Respiratory failure (HCC)        Precautions: fall       ASSESSMENT:   Based on the objective data described below, the patient presents with near baseline level of functioning for adls and mobility. He is limited by lines during mobility, but demonstrates good balance when up in room to bathroom. Mobility pursuits limited this date due to high blood pressure readings in all positions; Pt also has had BG in 500-600s recently. Pt is retired and seems to have limited insight into his medical status/condition (DM) related to his current lifestyle. Would benefit from Diabetes and wellness education. No acute OT is needed at this time. Further skilled acute occupational therapy is not indicated at this time. Discharge Recommendations: wellness education, DM education  Further Equipment Recommendations for Discharge: none      SUBJECTIVE:   Patient stated I feel better today.     OBJECTIVE DATA SUMMARY:   HISTORY:   Past Medical History:   Diagnosis Date    Hypertension      Past Surgical History:   Procedure Laterality Date    HX LITHOTRIPSY         Prior Level of Function/Environment/Context: Pt lives with his partner. He is retired, independent and sedentary. He enjoys his 5 dogs.     Occupations in which the patient is/was successful, what are the barriers preventing that success: limited insight into his health/wellness  Performance Patterns (routines, roles, habits, and rituals):   Personal Interests and/or values: enjoys his 5 dogs  Expanded or extensive additional review of patient history:     Home Situation  Home Environment: Private residence  # Steps to Enter: 7  Rails to Enter: Yes  Hand Rails : Bilateral  One/Two Story Residence: One story  Living Alone: No  Support Systems: Spouse/Significant Other/Partner  Patient Expects to be Discharged to[de-identified] Private residence  Current DME Used/Available at Home: None  Tub or Shower Type: Tub/Shower combination  [x]  Right hand dominant   []  Left hand dominant    EXAMINATION OF PERFORMANCE DEFICITS:  Cognitive/Behavioral Status:  Neurologic State: Alert; Appropriate for age  Orientation Level: Oriented X4  Cognition: Follows commands  Perception: Appears intact  Perseveration: No perseveration noted  Safety/Judgement: Awareness of environment; Fall prevention;Home safety    Skin: generally intact    Edema: none observed. Hearing: Auditory  Auditory Impairment: None    Vision/Perceptual:            Baseline,. Range of Motion:  BUEs:   AROM: Within functional limits                         Strength:  BUEs:    Strength: Generally decreased, functional                Coordination:  Coordination: Within functional limits  Fine Motor Skills-Upper: Left Intact; Right Intact    Gross Motor Skills-Upper: Left Intact; Right Intact    Tone & Sensation:  Sensation: intact  Tone: Normal                         Balance:  Sitting: Intact  Standing: Intact    Functional Mobility and Transfers for ADLs:  Bed Mobility:  Rolling:  (pt seated upon arrival)    Transfers:  Sit to Stand: Supervision  Stand to Sit: Supervision  Toilet Transfer : Supervision (due to lines and leads)    ADL Assessment:  Feeding: Independent    Oral Facial Hygiene/Grooming: Setup    Bathing: Setup    Upper Body Dressing: Setup    Lower Body Dressing: Setup    Toileting: Supervision                ADL Intervention and task modifications:   Pt educated on role of OT and Ot plan of care. Pt was educated in balancing rest and activity throughout the day to maintain energy for adls. Educated on home safety and fall prevention. Cognitive Retraining  Safety/Judgement: Awareness of environment; Fall prevention;Home safety    Therapeutic Exercise:  Encouraged OOB with nursing's assistance   Functional Measure:  Barthel Index:    Bathing: 0  Bladder: 10  Bowels: 10  Groomin  Dressing: 10  Feeding: 10  Mobility: 0  Stairs: 10  Toilet Use: 10  Transfer (Bed to Chair and Back): 15  Total: 80       Barthel and G-code impairment scale:  Percentage of impairment CH  0% CI  1-19% CJ  20-39% CK  40-59% CL  60-79% CM  80-99% CN  100%   Barthel Score 0-100 100 99-80 79-60 59-40 20-39 1-19   0   Barthel Score 0-20 20 17-19 13-16 9-12 5-8 1-4 0      The Barthel ADL Index: Guidelines  1. The index should be used as a record of what a patient does, not as a record of what a patient could do. 2. The main aim is to establish degree of independence from any help, physical or verbal, however minor and for whatever reason. 3. The need for supervision renders the patient not independent. 4. A patient's performance should be established using the best available evidence. Asking the patient, friends/relatives and nurses are the usual sources, but direct observation and common sense are also important. However direct testing is not needed. 5. Usually the patient's performance over the preceding 24-48 hours is important, but occasionally longer periods will be relevant. 6. Middle categories imply that the patient supplies over 50 per cent of the effort. 7. Use of aids to be independent is allowed. Michelle Kiran, Barthel, D.W. ([id number]). Functional evaluation: the Barthel Index. [address] [facility] (14)2. Vera Core moises Tigre, ALBER.VICKIEF, Bradly Duvall., Jerod Mora., Saul ZhangSanger [facility], [address] [place] [address] (). Measuring the change indisability after inpatient rehabilitation; comparison of the responsiveness of the Barthel Index and Functional [place] Measure. Journal of Neurology, Neurosurgery, and Psychiatry, 66(4), 357-158. Tad Pardo, N.J.A, JULIET Ferrer, & Anna Marie Pitts M.A. (2004.) Assessment of post-stroke quality of life in cost-effectiveness studies: The usefulness of the Barthel Index and the EuroQoL-5D. Quality of Life Research, 13, 713-26       G codes:   In compliance with CMSs Claims Based Outcome Reporting, the following G-code set was chosen for this patient based on their primary functional limitation being treated: The outcome measure chosen to determine the severity of the functional limitation was the Barthel index with a score of 80/100 which was correlated with the impairment scale. ? Self Care:     - CURRENT STATUS: CI - 1%-19% impaired, limited or restricted    - GOAL STATUS: CI - 1%-19% impaired, limited or restricted    - D/C STATUS:  CI - 1%-19% impaired, limited or restricted     Occupational Therapy Evaluation Charge Determination   History Examination Decision-Making   LOW Complexity : Brief history review  MEDIUM Complexity : 3-5 performance deficits relating to physical, cognitive , or psychosocial skils that result in activity limitations and / or participation restrictions MEDIUM Complexity : Patient may present with comorbidities that affect occupational performnce. Miniml to moderate modification of tasks or assistance (eg, physical or verbal ) with assesment(s) is necessary to enable patient to complete evaluation       Based on the above components, the patient evaluation is determined to be of the following complexity level: LOW   Pain:  Pain Scale 1: Numeric (0 - 10)  Pain Intensity 1: 0              Activity Tolerance:   BP increased throughout position changes and minimal mobility. Nursing informed. Please refer to the flowsheet for vital signs taken during this treatment. After treatment:   [x]  Patient left in no apparent distress sitting up in chair  []  Patient left in no apparent distress in bed  [x]  Call bell left within reach  [x]  Nursing notified  [x]  Caregiver present  []  Bed alarm activated    COMMUNICATION/EDUCATION:   Communication/Collaboration:  [x]      Home safety education was provided and the patient/caregiver indicated understanding.   [x]      Patient/family have participated as able and agree with findings and recommendations. []      Patient is unable to participate in plan of care at this time.   Findings and recommendations were discussed with: Physical Therapist and Registered Nurse    RAYNE Mejias/STEVEN   35 minutes

## 2018-03-20 NOTE — PROGRESS NOTES
Pt is a 57 yo  male admitted on 3/19/18 for respiratory failure. Pt lives in a [place] house with spouse in [facility]. Pt is independent in ADLs/IADLs to include driving. Pt has used [facility] services and SNF, acute inpatient rehab in the past but does not recall the names. Pt has no reported DME at home. Pt to dc home by private vehicle with family. Pt to transport self or use family support for follow-up care. Pt's preferred Rx is [facility] ([address] [place] [address] Rd). CM met with pt to verify demographic info and complete initial assessment, dc planning. Pt is alert and oriented x 4. Pt has been going to [facility] for primary care, but is interested in getting a PCP at one of the [address] [place] [address] offices. CM to consult CM specialist to assist. Pt is currently on 2LPM of continuous O2, RA at baseline. Nursing to wean off O2 as tolerated and complete O2 challenge to determine if home O2 needs to be arranged. Pt will likely need nebulizer at discharge. CM will continue to follow-up to ensure additional CM needs are met. Possible dc 24-28 hrs.     5:20PM  CM sent referral to [facility] for Nebulizer via [employer], awaiting acceptance. Reason for Admission:  Respiratory failure      RRAT Score: 0       Plan for utilizing home health:  N/A at this time, will assess       Likelihood of Readmission: Low          Care Management Interventions  PCP Verified by CM: Yes  Palliative Care Criteria Met (RRAT>21 & CHF Dx)?: No  Mode of Transport at Discharge:  Other (see comment) (By private vehicle with family)  Transition of Care Consult (CM Consult): Discharge Planning  Discharge Durable Medical Equipment: No (Possible need for neb, home O2)  Health Maintenance Reviewed: Yes  Physical Therapy Consult: Yes  Occupational Therapy Consult: Yes  Speech Therapy Consult: No  Current Support Network: Lives with Spouse, Own Home ([address] with spouse)  Confirm Follow Up Transport: Self  Plan discussed with Pt/Family/Caregiver: Yes  Discharge Location  Discharge Placement:  (TBD)    JEFF Amezcua Supervisee in Social Work, [address] [place] [address]  [phone]

## 2018-03-20 NOTE — CDMP QUERY
Dr. Jimmy Velasco :  Patient is noted to have a BMI of 40.94. Please clarify if this patient is:     =>Morbidly obese (BMI ³ 40)  =>Obese (BMI 30 - 39.9)  =>Overweight (BMI 25 - 29.9)  =>Other explanation of clinical findings  =>Unable to determine (no explanation for clinical findings)    Presentation: 5'5\", 246 lbs = BMI 40.94    REFERENCE:  The [address] [place] [address] has issued a statement indicating that, \"Individuals who are overweight, obese, or morbidly obese are at an increased risk for certain medical conditions when compared to persons of normal weight. Therefore, these conditions are always clinically significant and reportable when documented by the provider. Please clarify and document your clinical opinion in the progress notes and discharge summary, including the definitive and or presumptive diagnosis, (suspected or probable), related to the above clinical findings. Please include clinical findings supporting your diagnosis.    Thank Ana Rivera

## 2018-03-20 NOTE — PROGRESS NOTES
Patient's vitals signs stable, but doses have an elevated heart rate making him a mews of 4. Patient is receiving neb treatments that could increase his heart rate. Patient is not in pain. Will continue to monitor. Also charge nurse notified.

## 2018-03-20 NOTE — PROGRESS NOTES
1300: Notified Jono Luke NP of patient's requiring sliding scale order and review of home medications.

## 2018-03-20 NOTE — DIABETES MGMT
DTC Consult Note    Recommendations/ Comments:   Patient may benefit from NPH 20 units to be dosed with each dose of steroids. This will help counteract the hyperglycemia caused from the steroids. If appropriate, please consider pills at discharge, patient does not want to go home on insulin. If insulin is needed, DTC can return and try to encourage insulin use at home. At this time, patient is refusing. Current hospital DM medication: Lispro Correctional insulin with high sensitivity, Lispro 4 units ac meals, Lantus 8 units at bedtime      Consult received for:                [x]             New diagnosis       Chart reviewed and initial evaluation complete on [facility]. Patient reports he is borderline diabetic. Reports that to find \"comfort\" in dealing with stress at home, he would drink sugared water. He also likes his tea really sweet because he's from the [place]. Discussed how his body is responding to steroids and foods as someone with diabetes. Patient does report family history of diabetes, however everyone is just \"borderline diabetic. \" Patient refused education as he does not feel he is diabetic. He says he knows what to do. Discussed and explained his A1c test, the result of 11.7% and how this is definitely diabetes. Patient is a 58 y.o. male with borderline diabetes- no medications at home. 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       A1c:   Lab Results   Component Value Date/Time    Hemoglobin A1c 11.7 (H) 03/20/2018 09:00 AM       Recent Glucose Results:   Lab Results   Component Value Date/Time     (HH) 03/20/2018 04:00 AM    GLUCPOC 560 (H) 03/20/2018 10:55 AM    GLUCPOC 588 (H) 03/20/2018 07:34 AM    GLUCPOC 573 (H) 03/20/2018 05:50 AM        Lab Results   Component Value Date/Time    Creatinine 1.93 (H) 03/20/2018 04:00 AM     Estimated Creatinine Clearance: 45.7 mL/min (based on Cr of 1.93). Active Orders   Diet    DIET DIABETIC CONSISTENT CARB Regular        PO intake: No data found. Will continue to follow as needed. Thank you.     Perla Haynes, [address] [place] [address], [address]  Office:  [phone]

## 2018-03-20 NOTE — PROGRESS NOTES
ADULT PROTOCOL: JET AEROSOL ASSESSMENT    Patient  Sandra Chacko     58 y.o.   male     3/20/2018  10:11 AM    Breath Sounds Pre Procedure: Right Breath Sounds: Diminished, Lower, Crackles                               Left Breath Sounds: Diminished, Lower, Crackles    Breath Sounds Post Procedure: Right Breath Sounds: Diminished, Lower, Crackles                                 Left Breath Sounds: Diminished, Lower, Crackles    Breathing pattern: Pre procedure Breathing Pattern: Regular          Post procedure Breathing Pattern: Regular    Heart Rate: Pre procedure Pulse: 112           Post procedure Pulse: 124    Resp Rate: Pre procedure Respirations: 16           Post procedure Respirations: 16      Cough: Pre procedure Cough: Productive               Post procedure Cough: Non-productive      Sputum: Pre procedure Sputum amount:  Moderate  Sputum color/odor: Yellow                  Oxygen: O2 Device: Nasal cannula   Flow rate (L/min) 3 lpm     Changed: NO    SpO2: Pre procedure SpO2: 95 %   with oxygen              Post procedure SpO2: 95 %  with oxygen    Nebulizer Therapy: Current medications Aerosolized Medications: Xopenex      Changed: NO    Smoking History: unknown    Problem List:   Patient Active Problem List   Diagnosis Code    [facility] J96.90       Respiratory Therapist: RT Flip

## 2018-03-21 LAB
ANION GAP SERPL CALC-SCNC: 8 MMOL/L (ref 5–15)
BUN SERPL-MCNC: 31 MG/DL (ref 6–20)
BUN/CREAT SERPL: 23 (ref 12–20)
CALCIUM SERPL-MCNC: 8.6 MG/DL (ref 8.5–10.1)
CHLORIDE SERPL-SCNC: 104 MMOL/L (ref 97–108)
CO2 SERPL-SCNC: 24 MMOL/L (ref 21–32)
CREAT SERPL-MCNC: 1.33 MG/DL (ref 0.7–1.3)
GLUCOSE BLD STRIP.AUTO-MCNC: 302 MG/DL (ref 65–100)
GLUCOSE BLD STRIP.AUTO-MCNC: 343 MG/DL (ref 65–100)
GLUCOSE BLD STRIP.AUTO-MCNC: 350 MG/DL (ref 65–100)
GLUCOSE BLD STRIP.AUTO-MCNC: 403 MG/DL (ref 65–100)
GLUCOSE SERPL-MCNC: 419 MG/DL (ref 65–100)
LACTATE SERPL-SCNC: 1.5 MMOL/L (ref 0.4–2)
POTASSIUM SERPL-SCNC: 4.1 MMOL/L (ref 3.5–5.1)
SERVICE CMNT-IMP: ABNORMAL
SODIUM SERPL-SCNC: 136 MMOL/L (ref 136–145)

## 2018-03-21 PROCEDURE — 93306 TTE W/DOPPLER COMPLETE: CPT

## 2018-03-21 PROCEDURE — 74011250637 HC RX REV CODE- 250/637: Performed by: INTERNAL MEDICINE

## 2018-03-21 PROCEDURE — 74011250637 HC RX REV CODE- 250/637: Performed by: NURSE PRACTITIONER

## 2018-03-21 PROCEDURE — 74011250636 HC RX REV CODE- 250/636: Performed by: INTERNAL MEDICINE

## 2018-03-21 PROCEDURE — 74011636637 HC RX REV CODE- 636/637: Performed by: NURSE PRACTITIONER

## 2018-03-21 PROCEDURE — 74011636637 HC RX REV CODE- 636/637: Performed by: INTERNAL MEDICINE

## 2018-03-21 PROCEDURE — 82962 GLUCOSE BLOOD TEST: CPT

## 2018-03-21 PROCEDURE — 77010033678 HC OXYGEN DAILY

## 2018-03-21 PROCEDURE — 94640 AIRWAY INHALATION TREATMENT: CPT

## 2018-03-21 PROCEDURE — 83605 ASSAY OF LACTIC ACID: CPT

## 2018-03-21 PROCEDURE — 80048 BASIC METABOLIC PNL TOTAL CA: CPT

## 2018-03-21 PROCEDURE — 36415 COLL VENOUS BLD VENIPUNCTURE: CPT

## 2018-03-21 PROCEDURE — 74011000250 HC RX REV CODE- 250: Performed by: INTERNAL MEDICINE

## 2018-03-21 PROCEDURE — 65660000000 HC RM CCU STEPDOWN

## 2018-03-21 RX ORDER — INSULIN LISPRO 100 [IU]/ML
6 INJECTION, SOLUTION INTRAVENOUS; SUBCUTANEOUS
Status: DISCONTINUED | OUTPATIENT
Start: 2018-03-21 | End: 2018-03-22 | Stop reason: HOSPADM

## 2018-03-21 RX ORDER — INSULIN GLARGINE 100 [IU]/ML
10 INJECTION, SOLUTION SUBCUTANEOUS
Status: DISCONTINUED | OUTPATIENT
Start: 2018-03-21 | End: 2018-03-22

## 2018-03-21 RX ORDER — AZITHROMYCIN 250 MG/1
500 TABLET, FILM COATED ORAL EVERY EVENING
Status: DISCONTINUED | OUTPATIENT
Start: 2018-03-21 | End: 2018-03-22 | Stop reason: HOSPADM

## 2018-03-21 RX ORDER — LANOLIN ALCOHOL/MO/W.PET/CERES
3 CREAM (GRAM) TOPICAL
Status: DISCONTINUED | OUTPATIENT
Start: 2018-03-21 | End: 2018-03-22 | Stop reason: HOSPADM

## 2018-03-21 RX ORDER — PREDNISONE 20 MG/1
40 TABLET ORAL
Status: DISCONTINUED | OUTPATIENT
Start: 2018-03-21 | End: 2018-03-22

## 2018-03-21 RX ORDER — LANOLIN ALCOHOL/MO/W.PET/CERES
3 CREAM (GRAM) TOPICAL
Status: DISCONTINUED | OUTPATIENT
Start: 2018-03-21 | End: 2018-03-21

## 2018-03-21 RX ADMIN — SODIUM CHLORIDE 125 ML/HR: 900 INJECTION, SOLUTION INTRAVENOUS at 06:16

## 2018-03-21 RX ADMIN — HEPARIN SODIUM 5000 UNITS: 5000 INJECTION, SOLUTION INTRAVENOUS; SUBCUTANEOUS at 09:46

## 2018-03-21 RX ADMIN — Medication 1 CAPSULE: at 12:49

## 2018-03-21 RX ADMIN — HEPARIN SODIUM 5000 UNITS: 5000 INJECTION, SOLUTION INTRAVENOUS; SUBCUTANEOUS at 15:09

## 2018-03-21 RX ADMIN — GUAIFENESIN 600 MG: 600 TABLET, EXTENDED RELEASE ORAL at 21:32

## 2018-03-21 RX ADMIN — INSULIN LISPRO 10 UNITS: 100 INJECTION, SOLUTION INTRAVENOUS; SUBCUTANEOUS at 12:48

## 2018-03-21 RX ADMIN — AMLODIPINE BESYLATE 5 MG: 5 TABLET ORAL at 09:46

## 2018-03-21 RX ADMIN — INSULIN LISPRO 7 UNITS: 100 INJECTION, SOLUTION INTRAVENOUS; SUBCUTANEOUS at 17:52

## 2018-03-21 RX ADMIN — ASPIRIN 81 MG: 81 TABLET, COATED ORAL at 09:46

## 2018-03-21 RX ADMIN — INSULIN LISPRO 7 UNITS: 100 INJECTION, SOLUTION INTRAVENOUS; SUBCUTANEOUS at 09:45

## 2018-03-21 RX ADMIN — INSULIN LISPRO 8 UNITS: 100 INJECTION, SOLUTION INTRAVENOUS; SUBCUTANEOUS at 21:30

## 2018-03-21 RX ADMIN — LEVALBUTEROL HYDROCHLORIDE 1.25 MG: 1.25 SOLUTION RESPIRATORY (INHALATION) at 11:34

## 2018-03-21 RX ADMIN — ALLOPURINOL 200 MG: 100 TABLET ORAL at 09:46

## 2018-03-21 RX ADMIN — SERTRALINE HYDROCHLORIDE 25 MG: 50 TABLET ORAL at 09:46

## 2018-03-21 RX ADMIN — INSULIN LISPRO 6 UNITS: 100 INJECTION, SOLUTION INTRAVENOUS; SUBCUTANEOUS at 12:49

## 2018-03-21 RX ADMIN — LEVALBUTEROL HYDROCHLORIDE 1.25 MG: 1.25 SOLUTION RESPIRATORY (INHALATION) at 15:18

## 2018-03-21 RX ADMIN — LEVALBUTEROL HYDROCHLORIDE 1.25 MG: 1.25 SOLUTION RESPIRATORY (INHALATION) at 07:22

## 2018-03-21 RX ADMIN — INSULIN GLARGINE 10 UNITS: 100 INJECTION, SOLUTION SUBCUTANEOUS at 21:29

## 2018-03-21 RX ADMIN — HEPARIN SODIUM 5000 UNITS: 5000 INJECTION, SOLUTION INTRAVENOUS; SUBCUTANEOUS at 21:33

## 2018-03-21 RX ADMIN — LEVALBUTEROL HYDROCHLORIDE 1.25 MG: 1.25 SOLUTION RESPIRATORY (INHALATION) at 03:25

## 2018-03-21 RX ADMIN — Medication 10 ML: at 15:10

## 2018-03-21 RX ADMIN — LEVALBUTEROL HYDROCHLORIDE 1.25 MG: 1.25 SOLUTION RESPIRATORY (INHALATION) at 20:31

## 2018-03-21 RX ADMIN — GUAIFENESIN 600 MG: 600 TABLET, EXTENDED RELEASE ORAL at 09:47

## 2018-03-21 RX ADMIN — HUMAN INSULIN 20 UNITS: 100 INJECTION, SUSPENSION SUBCUTANEOUS at 09:00

## 2018-03-21 RX ADMIN — INSULIN LISPRO 6 UNITS: 100 INJECTION, SOLUTION INTRAVENOUS; SUBCUTANEOUS at 09:45

## 2018-03-21 RX ADMIN — Medication 10 ML: at 15:09

## 2018-03-21 RX ADMIN — MELATONIN 3 MG ORAL TABLET 3 MG: 3 TABLET ORAL at 21:59

## 2018-03-21 RX ADMIN — PREDNISONE 40 MG: 20 TABLET ORAL at 09:46

## 2018-03-21 RX ADMIN — Medication 10 ML: at 21:33

## 2018-03-21 RX ADMIN — AZITHROMYCIN 500 MG: 250 TABLET, FILM COATED ORAL at 17:52

## 2018-03-21 RX ADMIN — INSULIN LISPRO 6 UNITS: 100 INJECTION, SOLUTION INTRAVENOUS; SUBCUTANEOUS at 17:51

## 2018-03-21 NOTE — PROGRESS NOTES
Hospitalist Progress Note    NAME: Merry Hensley   :  1955   MRN:  [id number]       Interim Hospital Summary: 58 y.o. male whom presented on 3/19/2018 with      Assessment / Plan:  Acute hypoxic respiratory failure  Acute bronchitis  Sepsis, POA  Lactic acidosis  - CTA chest neg for acute PE. B/l lower lobe atelectasis  - Bcx no growth  - received IV rocephin and azithromycin. Will complete 5 day of z-pack  - IV steroid changed to PO prednisone; will start on taper dose  - received IV hydration.   - Lacitc 1.5 from 5.4  - nebs, antitussives, incentive spirometry  - quit smoking about 30 yrs ago. - weaned off from supplemental O2. RA O2 93%. Newly diagnosed with diabetes  - hgbA1C 11.7  - seen by diabetic educator. We discussed about dietary and life style changes. Pt is willing to learn about insulin therapy. Pt will be going home with Lantus (pt doesn't want to administer insulin injection more than once). Pt is willing to take blood glucose check at least twice a day; before breakfast and at bedtime.      GAURANG  - resolving; creat 1.3 from 1.9     HTN  - resume home meds  - hydralazine prn     Depression  - cont' zoloft     Gout  - con't allopurinol     Code Status: Full  Surrogate Decision Maker: pt's wife  DVT Prophylaxis: heparin  GI Prophylaxis: not indicated      Recommended Disposition: Home w/Family we will arrange home health nurse service to make sure pt is delivering insulin appropriately. Subjective:     Chief Complaint / Reason for Physician Visit  \"I am very concerned about diabetes thing\". Discussed with RN events overnight.      Review of Systems:  Symptom Y/N Comments  Symptom Y/N Comments   Fever/Chills n   Chest Pain n    Poor Appetite    Edema     Cough n   Abdominal Pain     Sputum    Joint Pain     SOB/VIVAR y   Pruritis/Rash     Nausea/vomit n   Tolerating PT/OT     Diarrhea n   Tolerating Diet     Constipation n   Other       Could NOT obtain due to:      Objective:     VITALS: Last 24hrs VS reviewed since prior progress note. Most recent are:  Patient Vitals for the past 24 hrs:   Temp Pulse Resp BP SpO2   03/21/18 1518 - - - - 92 %   03/21/18 1438 97.7 °F (36.5 °C) 71 18 164/88 93 %   03/21/18 1134 - - - - 93 %   03/21/18 1015 98.1 °F (36.7 °C) 92 18 142/56 94 %   03/21/18 0730 98 °F (36.7 °C) 99 18 (!) 153/94 92 %   03/21/18 0722 - - - - 93 %   03/21/18 0554 97.9 °F (36.6 °C) 86 18 125/77 93 %   03/21/18 0325 - - - - 92 %   03/20/18 2338 98.1 °F (36.7 °C) (!) 111 18 150/77 93 %   03/20/18 2330 - - - - 93 %   03/20/18 2002 - - - - 93 %   03/20/18 1922 98.1 °F (36.7 °C) (!) 126 18 163/88 95 %       Intake/Output Summary (Last 24 hours) at 03/21/18 1559  Last data filed at 03/21/18 0825   Gross per 24 hour   Intake                0 ml   Output             1175 ml   Net            -1175 ml        PHYSICAL EXAM:  General: WD, WN. Alert, cooperative, no acute distress    EENT:  EOMI. Anicteric sclerae. MMM  Resp:  Clear in apex with a few crackles at bases. No accessory muscle use  CV:  Regular  rhythm,  No edema  GI:  Soft, Non distended, Non tender.  +Bowel sounds  Neurologic:  Alert and oriented X 3, normal speech,   Psych:   Good insight. Not anxious nor agitated  Skin:  No rashes. No jaundice    Reviewed most current lab test results and cultures  YES  Reviewed most current radiology test results   YES  Review and summation of old records today    NO  Reviewed patient's current orders and MAR    YES  PMH/SH reviewed - no change compared to H&P  ________________________________________________________________________  Care Plan discussed with:    Comments   Patient y    Family      RN y    Care Manager y    Consultant                       y Multidiciplinary team rounds were held today with , nursing, pharmacist and clinical coordinator. Patient's plan of care was discussed; medications were reviewed and discharge planning was addressed. ________________________________________________________________________  Total NON critical care TIME:  30  Minutes    Total CRITICAL CARE TIME Spent:   Minutes non procedure based      Comments   >50% of visit spent in counseling and coordination of care     ________________________________________________________________________  Aliya Samayoa NP     Procedures: see electronic medical records for all procedures/Xrays and details which were not copied into this note but were reviewed prior to creation of Plan. LABS:  I reviewed today's most current labs and imaging studies.   Pertinent labs include:  Recent Labs      03/20/18   0900  03/19/18   1158   WBC  8.8  11.6*   HGB  14.0  14.7   HCT  42.3  44.1   PLT  177  212     Recent Labs      03/21/18   0216  03/20/18   0400  03/19/18   1158   NA  136  132*  132*   K  4.1  4.3  3.7   CL  104  98  93*   CO2  24  22  31   GLU  419*  618*  372*   BUN  31*  39*  36*   CREA  1.33*  1.93*  1.59*   CA  8.6  8.9  9.3   ALB   --    --   3.9   TBILI   --    --   0.8   SGOT   --    --   22   ALT   --    --   40       Signed: )Palmer Levi NP

## 2018-03-21 NOTE — PROGRESS NOTES
CM notified pt has been accepted for Nebulizer from [facility]. CM delivered Nebulizer from closet, is sitting at bedside with pt.  CM specialist arranged new PCP appointment for pt at . [address] (per pt request), placed on AVS.     Eduin Garcia, MSW [employer] in Social Work, [address] [place] [address]  [phone]

## 2018-03-21 NOTE — PROGRESS NOTES
Received report from Shantelle Avalos, Reading Room. Assumed care of patient. 0200 Pt sitting in chair at bedside.

## 2018-03-21 NOTE — PROGRESS NOTES
ADULT PROTOCOL: JET AEROSOL  REASSESSMENT    Patient  Dwight Fall     58 y.o.   male     3/21/2018  10:06 AM    Breath Sounds Pre Procedure: Right Breath Sounds: Crackles, Expiratory wheezing                               Left Breath Sounds: Crackles, Expiratory wheezing    Breath Sounds Post Procedure: Right Breath Sounds: Crackles, Expiratory wheezing                                 Left Breath Sounds: Crackles, Expiratory wheezing    Breathing pattern: Pre procedure Breathing Pattern: Regular          Post procedure Breathing Pattern: Regular    Heart Rate: Pre procedure Pulse: 104           Post procedure Pulse: 100    Resp Rate: Pre procedure Respirations: 16           Post procedure Respirations: 16            Cough: Pre procedure Cough: Congested, Non-productive               Post procedure Cough: Congested, Non-productive      Oxygen: O2 Device: Nasal cannula   Flow rate (L/min) 1 lpm     Changed: NO    SpO2: Pre procedure SpO2: 93 %   with oxygen              Post procedure SpO2: 92 %  with oxygen    Nebulizer Therapy: Current medications Aerosolized Medications: Xopenex      Changed: NO    Smoking History: former smoker    Problem List:   Patient Active Problem List   Diagnosis Code    Res[facility] failure ([facility] J96.90       Respiratory Therapist: RT Morro

## 2018-03-21 NOTE — PROGRESS NOTES
0700: Bedside shift change report given to Collin Munoz RN (oncoming nurse) by Bertrand Larsen RN (offgoing nurse). Report included the following information SBAR, Kardex, ED Summary, [facility] and Recent Results. 1750: Pt verbalized process for injecting insulin and practiced administering his own insulin injection with his dinner time insulin. 1900: Walking Home O2 trial completed. HR and SpO2% are as follows:    Sitting: , SpO2% 92, RA   Standing: , SpO2% 93, RA   Walking: , SpO2%, 93, RA  Pt has no complains of SOB or VIVAR      Bedside shift change report given to Jose Zaragoza RN (oncoming nurse). Report included the following information SBAR, Kardex, ED Summary, Intake/Output and MAR. SHIFT SUMMARY:      [address] NURSING NOTE   Admission Date 3/19/2018   Admission Diagnosis Respiratory failure ([address]   Consults None      Cardiac Monitoring [x] Yes [] No      Purposeful Hourly Rounding [x] Yes    Alyse Score Total Score: 1   Alyse score 3 or > [] Bed Alarm [] Avasys [] 1:1 sitter [] Patient refused (Signed refusal form in chart)   Kristopher Score Kristopher Score: 20   Kristopher score 14 or < [] PMT consult [] Wound Care consult    []  Specialty bed  [] Nutrition consult      Influenza Vaccine Received Flu Vaccine for Current Season (usually Sept-March): Yes           Oxygen needs? [x] Room air Oxygen @  []1L    []2L    []3L   []4L    []5L   []6L via  NC   Chronic home O2 use?  [] Yes [] No  Perform O2 challenge test and document in progress note using smartphrase (.Homeoxygen)      Last bowel movement Last Bowel Movement Date: 03/20/18      Urinary Catheter             LDAs               Peripheral IV 03/19/18 Left Arm (Active)   Site Assessment Clean, dry, & intact 3/21/2018  3:18 PM   Phlebitis Assessment 0 3/21/2018  3:18 PM   Infiltration Assessment 0 3/21/2018  3:18 PM   Dressing Status Clean, dry, & intact 3/21/2018  3:18 PM   Dressing Type Tape;Transparent 3/21/2018  3:18 PM   Hub Color/Line Status Blue;Flushed 3/21/2018  3:18 PM   Action Taken Open ports on tubing capped 3/21/2018  7:30 AM                         Readmission Risk Assessment Tool Score Low Risk            2       Total Score        2 . Living with Significant Other. Assisted Living. LTAC. SNF. or   Rehab        Criteria that do not apply:    Has Seen PCP in Last 6 Months (Yes=3, No=0)    Patient Length of Stay (>5 days = 3)    IP Visits Last 12 Months (1-3=4, 4=9, >4=11)    Pt.  Coverage (Medicare=5 , Medicaid, or Self-Pay=4)    Charlson Comorbidity Score (Age + Comorbid Conditions)       Expected Length of Stay 3d 16h   Actual Length of Stay 2

## 2018-03-21 NOTE — DIABETES MGMT
DTC Progress Note    Recommendations/ Comments:  Noting tapering of steroids to oral this am and will watch for adjustments in medications. Current hospital DM medication: NPH 20 units bid, Lispro correctional insulin - high sensitivity. Chart reviewed on [facility] due to hyperglycemia - started last pm and BG improving    atient is a 58 y.o. male with new dx of DM seen by staff yesterday for education. Will consider starting oral medications as currently pt is refusing insulin. A1c:   Lab Results   Component Value Date/Time    Hemoglobin A1c 11.7 (H) 03/20/2018 09:00 AM       Recent Glucose Results:   Lab Results   Component Value Date/Time     (H) 03/21/2018 02:16 AM    GLUCPOC 343 (H) 03/21/2018 08:03 AM    GLUCPOC 546 (H) 03/20/2018 08:41 PM    GLUCPOC >600 ([place]) 03/20/2018 08:38 PM        Lab Results   Component Value Date/Time    Creatinine 1.33 (H) 03/21/2018 02:16 AM     Estimated Creatinine Clearance: 66.8 mL/min (based on Cr of 1.33). Active Orders   Diet    DIET DIABETIC CONSISTENT CARB Regular        PO intake: No data found. Will continue to follow as needed.     Thank you  Rhett Diaz RD, CDE

## 2018-03-21 NOTE — PROGRESS NOTES
Patient had elevation in blood sugar. Paged Dr. Navarro Miss x2 for additional coverage. Patient had coverage up to 350 plus had insulin regularly scheduled with meals. These were given. He will be rechecked at bedtime. Patient gets up with minimal assistance. At times he does appear short winded. Lung sounds diminished throughout. I locked up his home meds in his . I got the pharmacy to add a daily dose today of the allopurinol and the asa. I explained to him why his 2 BP meds besides the norvasc were held by Doctor as he has altered renal function. He understood.  His blood pressure was elevated at beginning of shift but came down to normal. He is still getting IV fluids

## 2018-03-21 NOTE — PROGRESS NOTES
Received report from [facility], [facility]. Assumed care of patient.

## 2018-03-22 VITALS
BODY MASS INDEX: 41.42 KG/M2 | SYSTOLIC BLOOD PRESSURE: 132 MMHG | WEIGHT: 248.6 LBS | RESPIRATION RATE: 18 BRPM | OXYGEN SATURATION: 93 % | HEART RATE: 106 BPM | DIASTOLIC BLOOD PRESSURE: 84 MMHG | HEIGHT: 65 IN | TEMPERATURE: 97.7 F

## 2018-03-22 DIAGNOSIS — E11.9 DIABETES MELLITUS WITHOUT COMPLICATION (HCC): Primary | ICD-10-CM

## 2018-03-22 PROBLEM — F32.A DEPRESSION: Status: ACTIVE | Noted: 2018-03-22

## 2018-03-22 PROBLEM — I10 HYPERTENSION: Status: ACTIVE | Noted: 2018-03-22

## 2018-03-22 PROBLEM — N17.9 ACUTE KIDNEY FAILURE (HCC): Status: ACTIVE | Noted: 2018-03-22

## 2018-03-22 PROBLEM — J20.9 ACUTE BRONCHITIS: Status: ACTIVE | Noted: 2018-03-22

## 2018-03-22 LAB
ANION GAP SERPL CALC-SCNC: 8 MMOL/L (ref 5–15)
BUN SERPL-MCNC: 29 MG/DL (ref 6–20)
BUN/CREAT SERPL: 23 (ref 12–20)
CALCIUM SERPL-MCNC: 8.6 MG/DL (ref 8.5–10.1)
CHLORIDE SERPL-SCNC: 104 MMOL/L (ref 97–108)
CO2 SERPL-SCNC: 24 MMOL/L (ref 21–32)
CREAT SERPL-MCNC: 1.26 MG/DL (ref 0.7–1.3)
GLUCOSE BLD STRIP.AUTO-MCNC: 256 MG/DL (ref 65–100)
GLUCOSE BLD STRIP.AUTO-MCNC: 256 MG/DL (ref 65–100)
GLUCOSE SERPL-MCNC: 318 MG/DL (ref 65–100)
POTASSIUM SERPL-SCNC: 3.7 MMOL/L (ref 3.5–5.1)
SERVICE CMNT-IMP: ABNORMAL
SERVICE CMNT-IMP: ABNORMAL
SODIUM SERPL-SCNC: 136 MMOL/L (ref 136–145)

## 2018-03-22 PROCEDURE — 74011250637 HC RX REV CODE- 250/637: Performed by: INTERNAL MEDICINE

## 2018-03-22 PROCEDURE — 74011636637 HC RX REV CODE- 636/637: Performed by: NURSE PRACTITIONER

## 2018-03-22 PROCEDURE — 74011250636 HC RX REV CODE- 250/636: Performed by: INTERNAL MEDICINE

## 2018-03-22 PROCEDURE — 94640 AIRWAY INHALATION TREATMENT: CPT

## 2018-03-22 PROCEDURE — 80048 BASIC METABOLIC PNL TOTAL CA: CPT | Performed by: NURSE PRACTITIONER

## 2018-03-22 PROCEDURE — 82962 GLUCOSE BLOOD TEST: CPT

## 2018-03-22 PROCEDURE — 74011250637 HC RX REV CODE- 250/637: Performed by: NURSE PRACTITIONER

## 2018-03-22 PROCEDURE — 74011000250 HC RX REV CODE- 250: Performed by: INTERNAL MEDICINE

## 2018-03-22 PROCEDURE — 36415 COLL VENOUS BLD VENIPUNCTURE: CPT | Performed by: NURSE PRACTITIONER

## 2018-03-22 RX ORDER — LANOLIN ALCOHOL/MO/W.PET/CERES
3 CREAM (GRAM) TOPICAL
Qty: 30 TAB | Refills: 0 | Status: SHIPPED | OUTPATIENT
Start: 2018-03-22 | End: 2018-04-03 | Stop reason: SDUPTHER

## 2018-03-22 RX ORDER — ALBUTEROL SULFATE 90 UG/1
1-2 AEROSOL, METERED RESPIRATORY (INHALATION)
Qty: 1 INHALER | Refills: 0 | Status: SHIPPED | OUTPATIENT
Start: 2018-03-22 | End: 2018-04-03

## 2018-03-22 RX ORDER — PREDNISONE 10 MG/1
20 TABLET ORAL
Qty: 3 TAB | Refills: 0 | Status: SHIPPED | OUTPATIENT
Start: 2018-03-23 | End: 2018-05-01 | Stop reason: ALTCHOICE

## 2018-03-22 RX ORDER — ASPIRIN 81 MG/1
81 TABLET ORAL DAILY
Qty: 30 TAB | Refills: 0 | Status: SHIPPED | OUTPATIENT
Start: 2018-03-22 | End: 2018-04-03 | Stop reason: SDUPTHER

## 2018-03-22 RX ORDER — PEN NEEDLE, DIABETIC 30 GX3/16"
NEEDLE, DISPOSABLE MISCELLANEOUS
Qty: 1 PACKAGE | Refills: 0 | Status: SHIPPED | OUTPATIENT
Start: 2018-03-22 | End: 2020-01-20

## 2018-03-22 RX ORDER — CODEINE PHOSPHATE AND GUAIFENESIN 10; 100 MG/5ML; MG/5ML
5-10 SOLUTION ORAL
Qty: 80 ML | Refills: 0 | Status: SHIPPED | OUTPATIENT
Start: 2018-03-22 | End: 2018-05-29

## 2018-03-22 RX ORDER — LEVALBUTEROL INHALATION SOLUTION 1.25 MG/3ML
1.25 SOLUTION RESPIRATORY (INHALATION)
Qty: 30 NEBULE | Refills: 0 | Status: SHIPPED | OUTPATIENT
Start: 2018-03-22 | End: 2018-04-03

## 2018-03-22 RX ORDER — SERTRALINE HYDROCHLORIDE 25 MG/1
25 TABLET, FILM COATED ORAL DAILY
Qty: 30 TAB | Refills: 0 | Status: SHIPPED | OUTPATIENT
Start: 2018-03-22 | End: 2018-04-03 | Stop reason: SDUPTHER

## 2018-03-22 RX ORDER — INSULIN GLARGINE 100 [IU]/ML
12 INJECTION, SOLUTION SUBCUTANEOUS
Qty: 1 VIAL | Refills: 0 | Status: SHIPPED | OUTPATIENT
Start: 2018-03-22 | End: 2019-04-08 | Stop reason: ALTCHOICE

## 2018-03-22 RX ORDER — INSULIN GLARGINE 100 [IU]/ML
14 INJECTION, SOLUTION SUBCUTANEOUS
Status: DISCONTINUED | OUTPATIENT
Start: 2018-03-22 | End: 2018-03-22 | Stop reason: HOSPADM

## 2018-03-22 RX ORDER — ALLOPURINOL 100 MG/1
200 TABLET ORAL DAILY
Qty: 30 TAB | Refills: 0 | Status: SHIPPED | OUTPATIENT
Start: 2018-03-22 | End: 2018-04-03 | Stop reason: SDUPTHER

## 2018-03-22 RX ORDER — AZITHROMYCIN 250 MG/1
250 TABLET, FILM COATED ORAL SEE ADMIN INSTRUCTIONS
Qty: 3 TAB | Refills: 0 | Status: SHIPPED | OUTPATIENT
Start: 2018-03-22 | End: 2018-04-03

## 2018-03-22 RX ORDER — AMLODIPINE BESYLATE 5 MG/1
5 TABLET ORAL DAILY
Qty: 30 TAB | Refills: 0 | Status: SHIPPED | OUTPATIENT
Start: 2018-03-22 | End: 2018-04-03 | Stop reason: SDUPTHER

## 2018-03-22 RX ORDER — LISINOPRIL 10 MG/1
10 TABLET ORAL DAILY
Qty: 30 TAB | Refills: 0 | Status: SHIPPED | OUTPATIENT
Start: 2018-03-22 | End: 2018-04-03 | Stop reason: SDUPTHER

## 2018-03-22 RX ADMIN — ALLOPURINOL 200 MG: 100 TABLET ORAL at 08:48

## 2018-03-22 RX ADMIN — AMLODIPINE BESYLATE 5 MG: 5 TABLET ORAL at 08:48

## 2018-03-22 RX ADMIN — HEPARIN SODIUM 5000 UNITS: 5000 INJECTION, SOLUTION INTRAVENOUS; SUBCUTANEOUS at 08:53

## 2018-03-22 RX ADMIN — INSULIN LISPRO 6 UNITS: 100 INJECTION, SOLUTION INTRAVENOUS; SUBCUTANEOUS at 12:23

## 2018-03-22 RX ADMIN — LEVALBUTEROL HYDROCHLORIDE 1.25 MG: 1.25 SOLUTION RESPIRATORY (INHALATION) at 00:14

## 2018-03-22 RX ADMIN — SERTRALINE HYDROCHLORIDE 25 MG: 50 TABLET ORAL at 08:49

## 2018-03-22 RX ADMIN — GUAIFENESIN 600 MG: 600 TABLET, EXTENDED RELEASE ORAL at 08:53

## 2018-03-22 RX ADMIN — Medication 1 CAPSULE: at 08:48

## 2018-03-22 RX ADMIN — INSULIN LISPRO 5 UNITS: 100 INJECTION, SOLUTION INTRAVENOUS; SUBCUTANEOUS at 08:47

## 2018-03-22 RX ADMIN — Medication 10 ML: at 03:23

## 2018-03-22 RX ADMIN — ASPIRIN 81 MG: 81 TABLET, COATED ORAL at 08:48

## 2018-03-22 RX ADMIN — INSULIN LISPRO 5 UNITS: 100 INJECTION, SOLUTION INTRAVENOUS; SUBCUTANEOUS at 12:23

## 2018-03-22 RX ADMIN — PREDNISONE 30 MG: 20 TABLET ORAL at 08:48

## 2018-03-22 RX ADMIN — INSULIN LISPRO 6 UNITS: 100 INJECTION, SOLUTION INTRAVENOUS; SUBCUTANEOUS at 08:47

## 2018-03-22 NOTE — DIABETES MGMT
DTC note:  Received two messages regarding pt and need to clarify questions. [facility] [place]. Per [facility], \"situation resolved and no need to come see pt\". Pt was seen on Tuesday and this am for DM teaching. Please refer to consult notes for details. Pt is scheduled for outpt fu with DTC on 4/13/18.     Thank you,  SAMY GonzalezN, RN, [address]

## 2018-03-22 NOTE — PROGRESS NOTES
Pt readied for DC at 10:00 am.  Pt stated he thought he was supposed to waste some medication from syring after drawing up insulin in his pen by dialing up to 12 units. Then he stated he was unaware he was supposed to change the needle out. After extensive teaching, pt now understands. Pt given DC instructions and prescriptions. Pt transported to [facility] via WC to home with services. IV acces and telly DC. Pt verbalized understanding of instructions.

## 2018-03-22 NOTE — DIABETES MGMT
DTC Consult Note    Recommendations/ Comments: If appropriate, please consider insulin pens at discharge. Current hospital DM medication: Lispro Correctional insulin with normal sensitivity, Humalog 6 units ac meals, Lantus 14 units at bedtime     Consult received for:       [x]             New diagnosis       Chart reviewed and initial evaluation complete on [facility] [place]. Pt appears and verbalizes content with diabetes diagnosis. States that this has been a wakeup call for he and his spouse. States he knows he has been eating the wrong things and his weight has just been going up. States that he will be making a lifestyle change and glad his spouse is on board. Patient is a 58 y.o. male with new diabetes of diabetes. BG monitoring at home- provided patient with a Contour Next meter    Assessed and instructed patient on the following:   ·  interpretation of lab results, blood sugar goals, hypoglycemia prevention and treatment, exercise, SMBG skills, nutrition, referred to Diabetes Educator, site rotation, use of insulin pen and self-injection of insulin:       Provided patient with the following: [x]             Survival skills education materials               [x]             Insulin education materials              [x]             Outpatient DTC contact number               [x]             Glucometer               Patient was able to give return demonstration of      [x]       saline injection      with []     without [x]       assistance needed. Provided patient with Lantus savings card. [x]       Nurse to have patient self inject prior to discharge. Discussed with patient and/or family need for follow up appointment for diabetes management after discharge.  Patient has been scheduled for diabetes survival skills class on April 6th at 1:00pm.     A1c:   Lab Results   Component Value Date/Time    Hemoglobin A1c 11.7 (H) 03/20/2018 09:00 AM       Recent Glucose Results:   Lab Results Component Value Date/Time     (H) 03/22/2018 03:12 AM    GLUCPOC 256 (H) 03/22/2018 07:53 AM    GLUCPOC 403 (H) 03/21/2018 08:43 PM    GLUCPOC 302 (H) 03/21/2018 04:05 PM        Lab Results   Component Value Date/Time    Creatinine 1.26 03/22/2018 03:12 AM     Estimated Creatinine Clearance: 70.5 mL/min (based on Cr of 1.26). Active Orders   Diet    DIET DIABETIC CONSISTENT CARB Regular        PO intake: No data found. Will continue to follow as needed. Thank you.     Sula Mcburney, [address] [place] [address], [address]  Office:  [phone]

## 2018-03-22 NOTE — DISCHARGE INSTRUCTIONS
Patient Discharge Instructions     Pt Name  Mary Tapia   Date of Birth 1955   Age  58 y.o. Medical Record Number  [id number]   PCP Nichole Guerrero MD    Admit date:  3/19/2018 @    [place] [address]    Room Number  2219/01   Date of Discharge 3/22/2018     Admission Diagnoses:     Respiratory failure ([facility]        No Known Allergies     You were admitted to [address] for  Respiratory failure ([facility]    YOUR OTHER MEDICAL DIAGNOSES INCLUDE (BUT NOT LIMITED TO ):  Present on Admission:   Respiratory failure ([facility]   Acute bronchitis   Newly diagnosed diabetes ([facility]   Hypertension   Depression   Acute kidney failure (HCC)      DIET:  Diabetic Diet     Recommended activity: Activity as tolerated  Follow up : Follow-up Information     Follow up With Details Comments [address], [place] Go on 4/3/2018 For new patient appointment at 10:30AM  [address]  [address] [address]  [phone]      [facility]  This is the provider for your home nebulizer. [address] [facility]  [phone]    [facility] DIABETIC TREATMENT Go on 4/13/2018 appointment for class- starts at 1:00 pm [address] [facility].  [phone]        ** Please check blood glucose twice a day; before breakfast and dinner. Bring the record to your follow up visit with your primary care physician  Insulin Glargine (By injection)   Insulin Glargine, Recombinant (IN-stockton-dorene GLAR-jejesus, jazmine-KOM-bi-maxine)  Treats diabetes. Brand Name(s): Basagldaquan Carbone, Lantus, Lantus Novaplus, Lantus SoloStar, Lantus SoloStar Novaplus, Toujeo   There may be other brand names for this medicine. When This Medicine Should Not Be Used: This medicine is not right for everyone. Do not use it if you had an allergic reaction to insulin glargine.   How to Use This Medicine:   Injectable  · Your healthcare provider will work with you to personalize your dose and treatment based on your insulin needs and lifestyle. You will be taught how to give yourself the injections. Make sure you understand all instructions. Ask the doctor, nurse, or pharmacist if you have questions. · If you use insulin once a day, it is best to use it at about the same time every day. · Always double-check both the concentration (strength) of your insulin and your dose. Concentration and dose are not the same. The dose is how many units of insulin you will use. The concentration tells how many units of insulin are in each milliliter (mL), such as 100 units/mL (U-100), but this does not mean you will use 100 units at a time. · Read and follow the patient instructions that come with this medicine. Talk to your doctor or pharmacist if you have any questions. · This medicine should look clear before you use it. Do not shake the vial. Do not mix this medicine with any other insulin or with water. · You will be shown the body areas where this shot can be given. Use a different body area each time you give yourself a shot. Keep track of where you give each shot to make sure you rotate body areas. · Use a new needle and syringe each time you inject your medicine. If you use a syringe, use only the kind that is made for insulin injections. Some insulin must be given with a specific type of syringe or needle. Ask your pharmacist if you are not sure which one to use. · Always check the label before use, to make sure you have the correct type of insulin. Do not change the brand, type, or concentration unless your doctor tells you to. If you use a pump or other device, make sure the insulin is made for that device. · Unopened medicine: Store the vials, cartridges, and SoloStar® pens in the refrigerator. Protect from light. Do not freeze. · Opened medicine:   ¨ Vials: Store in the refrigerator or at room temperature in a cool place, away from sunlight and heat.  Use within 28 days.  Sameul Catching or SoloStar® pen: Store at room temperature, away from direct heat and light. Do not refrigerate. Throw away any opened cartridge or Lantus® SoloStar® pen after 28 days. Throw away any opened [employer] after 42 days. · Throw away used needles in a hard, closed container that the needles cannot poke through. Keep this container away from children and pets. Drugs and Foods to Avoid:   Ask your doctor or pharmacist before using any other medicine, including over-the-counter medicines, vitamins, and herbal products. · Some medicines can change the amount of insulin you need to use and make it harder for you to control your diabetes. Tell your doctor about all other medicines that you are using. · Do not drink alcohol while you are using this medicine. Warnings While Using This Medicine:   · Tell your doctor if you are pregnant or breastfeeding, or if you have kidney disease, liver disease, heart disease, or heart failure. · This medicine may cause the following problems:  ¨ Low blood sugar or low potassium levels in the blood  ¨ Fluid retention or heart failure (when used with thiazolidinedione [TZD] medicine)  · Never share insulin pens, needles, or cartridges with anyone. Sharing these can pass hepatitis viruses, HIV, or other illnesses from one person to another. · Keep all medicine out of the reach of children. Never share your medicine with anyone.   Possible Side Effects While Using This Medicine:   Call your doctor right away if you notice any of these side effects:  · Allergic reaction: Itching or hives, swelling in your face or hands, swelling or tingling in your mouth or throat, chest tightness, trouble breathing  · Dry mouth, increased thirst, muscle cramps, nausea or vomiting, uneven heartbeat  · Rapid weight gain, swelling in your hands, ankles, or feet, trouble breathing, tiredness  · Shaking, trembling, sweating, fast or pounding heartbeat, lightheadedness, hunger, confusion  If you notice these less serious side effects, talk with your doctor:   · Redness, pain, itching, swelling, or any skin changes where the shot was given  If you notice other side effects that you think are caused by this medicine, tell your doctor. Call your doctor for medical advice about side effects. You may report side effects to FDA at 1-704-FDA-5911  © 2017 [address] Information is for End User's use only and may not be sold, redistributed or otherwise used for commercial purposes. The above information is an  only. It is not intended as medical advice for individual conditions or treatments. Talk to your doctor, nurse or pharmacist before following any medical regimen to see if it is safe and effective for you. · It is important that you take the medication exactly as they are prescribed. · Keep your medication in the bottles provided by the pharmacist and keep a list of the medication names, dosages, and times to be taken in your wallet. · Do not take other medications without consulting your doctor. ADDITIONAL INFORMATION: If you experience any of the following symptoms or have any health problem not listed below, then please call your primary care physician or return to the emergency room if you cannot get hold of your doctor: Fever, chills, nausea, vomiting, diarrhea, change in mentation, falling, bleeding, shortness of breath. I understand that if any problems occur once I am discharged, I am supposed to call my Primary care physician for further care or seek help in the Emergency Department at the nearest Healthcare facility. I have had an opportunity to discuss my clinical issues with my doctor and nursing staff. I understand and acknowledge receipt of the above instructions. Physician's or R.N.'s Signature                                                            Date/Time                                                                                                                                              Patient or Representative Signature                                                 Date/Time

## 2018-03-22 NOTE — PROGRESS NOTES
Patient was educated on using single use insulin syringes and was shown the correct method. Utilizing the teach-back method, Patient demonstrated the correct method of drawing up 12 units, utilizing alcohol pads, and correct method of subcutaneous injection. Patient completed this 1 time will assistance and 4 times independently. All questions and concerns were answered. Patient states that he feels comfortable completing self-preformed insulin injections.

## 2018-03-22 NOTE — DISCHARGE SUMMARY
Hospitalist Discharge Summary     Patient ID:  Sandra Chacko  [id number]  58 y.o.  1955    PCP on record: Bertha Plaza MD    Admit date: 3/19/2018  Discharge date and time: 3/22/2018      DISCHARGE DIAGNOSIS:  Acute hypoxic respiratory failure  Acute bronchitis  Sepsis, POA  Lactic acidosis  Newly diagnosed with diabetes  GAURANG  HTN  Depression  Gout    CONSULTATIONS:  None    Excerpted HPI from H&P of Oanh Lamb MD:  Sandra Chacko is a 58 y.o.  male w pmhx significant for HTN  Present to ED c/o worsening of SO for apprx 6 weeks. Per pt, he states he has had ongoing symptoms of productive cough, fatigue, generalized weakness, and was treated with Z pack and PO steroids x2. He states the first time he went to [facility], he was started on a Zpack which symptoms started to improved. He then went to [place] and his wife caught the [place]". Pt then started feeling sick again with congestion and cough. He then went back to [facility] and was given PO prednisone with antitussives. Pt reports symptoms progressively worsened so he called [facility] and was given an second round of [place] which he completed D3/5. Due to worsening of SOB, productive cough, inability to sleep at night due to cough, pt was seen [facility] at [facility] and was found to be hypoxic with SpO2 88% on RA. Pt was given 2 rounds of duoneb and was referred to the ER for further evaluation. Pt denies any fever, chills, cp, palpitations, myalgia, n/v/d. He denied ever been diagnosed or tested for the flu. In the ED, vitals: T97.9, P 120, /88, SpO2 88% on RA, improved with [facility]  Pertinent labs: WBC 11.6, Na 132, K 3.7, Cr 1.59, lactate 2.7   CTA chest neg for PE.  B/l lower lobe atelectasis   We were asked to admit for work up and evaluation of the above problems.      ______________________________________________________________________  DISCHARGE SUMMARY/HOSPITAL COURSE:  for full details see H&P, daily progress notes, labs, consult notes. Acute hypoxic respiratory failure  Acute bronchitis  Sepsis, POA  Lactic acidosis  - CTA chest neg for acute PE. B/l lower lobe atelectasis  - Bcx no growth  - received IV rocephin and azithromycin. Will complete 5 day of z-pack  - IV steroid changed to PO prednisone; advised to complete prednisone taper as directed  - received IV hydration.   - Lacitc 1.5 from 5.4  - nebs, antitussives, incentive spirometry  - quit smoking about 30 yrs ago. - weaned off from supplemental O2. RA O2 93%.    Newly diagnosed with diabetes  - hgbA1C 11.7  - seen by diabetic educator. We discussed about dietary and life style changes. Pt is willing to learn about insulin therapy. Pt will be going home with Lantus (pt doesn't want to administer insulin injection more than once). Pt is willing to take blood glucose check at least twice a day; before breakfast and at bedtime.  - Advised pt to check blood glucose before breakfast and dinner time. Informed the pt to bring the data to his follow up visit with pcp. Requested to set up home health nurse to visit reenforce diabetic dietary education and insulin therapy      GAURANG  - resolved      HTN  - resume home meds      Depression  - cont' zoloft      Gout  - con't allopurinol      Pt was seeing the medical provider at the [facility]. We set up a follow up visit with the new pcp upon discharge. Pt has been reminded to follow up with pcp as scheduled. Pt is hoping to take pill rather than injection in near future. Pt agreed to following on dietary and life style modification. I provided RX for all of his current medications without refill.        _______________________________________________________________________  Patient seen and examined by me on discharge day. Pertinent Findings:  Gen:    Not in distress  Chest: Clear lungs  CVS:   Regular rhythm.   No edema  Abd:  Soft, not distended, not tender  Neuro:  Alert, orient x 4  _______________________________________________________________________  DISCHARGE MEDICATIONS:   Current Discharge Medication List      START taking these medications    Details   predniSONE (DELTASONE) 10 mg tablet Take 2 Tabs by mouth daily (with breakfast). 1 tablet once a day for 2 days then 1/2 tablet once a day for 2 days  Qty: 3 Tab, Refills: 0      melatonin 3 mg tablet Take 1 Tab by mouth nightly as needed. Qty: 30 Tab, Refills: 0      levalbuterol (XOPENEX) 1.25 mg/3 mL nebu 3 mL by Nebulization route every four (4) hours as needed. Qty: 30 Nebule, Refills: 0      L. acidoph & paracasei- S therm- Bifido (LETICIA-Q/RISAQUAD) 8 billion cell cap cap Take 1 Cap by mouth daily. Qty: 3 Cap, Refills: 0      insulin glargine (LANTUS) 100 unit/mL injection 12 Units by SubCUTAneous route nightly. Qty: 1 Vial, Refills: 0      Nebulizer Accessories kit by Nebulization route every six (6) hours as needed for Cough. Qty: 1 Kit, Refills: 0      Insulin Needles, Disposable, 31 gauge x 5/16\" ndle by SubCUTAneous route nightly. Qty: 1 Package, Refills: 0         CONTINUE these medications which have CHANGED    Details   albuterol (PROVENTIL HFA, VENTOLIN HFA, PROAIR HFA) 90 mcg/actuation inhaler Take 1-2 Puffs by inhalation every four (4) hours as needed for Wheezing or Shortness of Breath. Qty: 1 Inhaler, Refills: 0      allopurinol (ZYLOPRIM) 100 mg tablet Take 2 Tabs by mouth daily. Qty: 30 Tab, Refills: 0      amLODIPine (NORVASC) 5 mg tablet Take 1 Tab by mouth daily. Qty: 30 Tab, Refills: 0      aspirin delayed-release 81 mg tablet Take 1 Tab by mouth daily. Qty: 30 Tab, Refills: 0      azithromycin (ZITHROMAX) 250 mg tablet Take 1 Tab by mouth See Admin Instructions. Take 1 tablet once a day for 3 days  Qty: 3 Tab, Refills: 0      guaiFENesin-codeine (ROBITUSSIN AC) 100-10 mg/5 mL solution Take 5-10 mL by mouth every four (4) hours as needed for Cough. Max Daily Amount: 60 mL.   Qty: 80 mL, Refills: 0 Associated Diagnoses: Cough      sertraline (ZOLOFT) 25 mg tablet Take 1 Tab by mouth daily. Qty: 30 Tab, Refills: 0      lisinopril (PRINIVIL, ZESTRIL) 10 mg tablet Take 1 Tab by mouth daily. Qty: 30 Tab, Refills: 0         STOP taking these medications       hydroCHLOROthiazide (HYDRODIURIL) 25 mg tablet Comments:   Reason for Stopping:               My Recommended Diet, Activity, Wound Care, and follow-up labs are listed in the patient's Discharge Insturctions which I have personally completed and reviewed. _______________________________________________________________________  DISPOSITION:    Home with Family:    Home with HH/PT/OT/RN: y   SNF/LTC:    PAMELA:    OTHER:        Condition at Discharge:  Stable  _______________________________________________________________________  Follow up with:   PCP : Venecia Denis MD  Follow-up Information     Follow up With Details Comments [address], [place] Go on 4/3/2018 For new patient appointment at 10:30AM  [address]  [address] [facility] [address]  [phone]      [facility]  This is the provider for your home nebulizer.   [address] [facility]  [phone]    [facility] Go on 4/13/2018 appointment for class- starts at 1:00 pm [address] [place] [address]  [phone]              Total time in minutes spent coordinating this discharge (includes going over instructions, follow-up, prescriptions, and preparing report for sign off to her PCP) :  30 minutes    Signed:  Palmer Montgomery NP

## 2018-03-22 NOTE — PROGRESS NOTES
CM acknowledged consult for [facility] assistance with insulin. \" CM provided [place] agency list. [address] offered. At [address], [place], [facility] [place] selected. FOC completed and placed on bedside chart. CM sent referral to [facility] [address] via [employer], awaiting acceptance. 1:23PM  [facility] notified CM that cannot accept pt at this time. [place]  informed CM that their [facility] office may be able to service pt's area. CM contacted Gabriella Villeda ([phone]) regarding HH referral. CM informed they are still running pt's insurance for verification of services. 3:30PM  [facility] contacted CM to inform that they have accepted pt and verified insurance to [facility] diabetes management. PCP f/u appointment scheduled. Nebulizer delivered to pt from [facility]. Pt to begin Diabetic treatment on 4/13/18 at [facility]. All information entered into pt AVS.     Pt has no additional CM needs at this time    Care Management Interventions  PCP Verified by CM: Yes  Palliative Care Criteria Met (RRAT>21 & CHF Dx)?: No  Mode of Transport at Discharge:  Other (see comment) (By private vehicle with family)  Transition of Care Consult (CM Consult): Home Health ([facility])  [address] No  Reason Outside [place]: Patient already serviced by other home care/hospice agency  Discharge Durable Medical Equipment: No (Possible need for neb, home O2)  Health Maintenance Reviewed: Yes  Physical Therapy Consult: Yes  Occupational Therapy Consult: Yes  Speech Therapy Consult: No  Current Support Network: Lives with Spouse, Own Home ([address] [place] [address] Avenue with spouse)  Confirm Follow Up Transport: Self  Plan discussed with Pt/Family/Caregiver: Yes  Freedom of Choice Offered: Yes  Discharge Location  Discharge Placement: Home with home health ([facility] [address] [facility] [place] for SN)    JEFF Arenas Supervisee in Social Work, [address] [place] [address]  [phone]

## 2018-03-22 NOTE — PROGRESS NOTES
4715: Per Ester Delgado NP [facility] to re-consult patient for DM teaching. Called and left message with Sigrid Yoder at the hospitals at [phone]. Notified primary care SASHA Millard.  5672: Called and left message with [facility]. Patient is unclear about insulin administration.   Notified Ester Delgado NP, will see patient

## 2018-03-24 LAB
BACTERIA SPEC CULT: NORMAL
SERVICE CMNT-IMP: NORMAL

## 2018-04-03 ENCOUNTER — TELEPHONE (OUTPATIENT)
Dept: FAMILY MEDICINE CLINIC | Age: 63
End: 2018-04-03

## 2018-04-03 ENCOUNTER — OFFICE VISIT (OUTPATIENT)
Dept: FAMILY MEDICINE CLINIC | Age: 63
End: 2018-04-03

## 2018-04-03 VITALS
HEART RATE: 102 BPM | BODY MASS INDEX: 40.25 KG/M2 | RESPIRATION RATE: 16 BRPM | SYSTOLIC BLOOD PRESSURE: 130 MMHG | WEIGHT: 241.6 LBS | TEMPERATURE: 97.9 F | OXYGEN SATURATION: 92 % | HEIGHT: 65 IN | DIASTOLIC BLOOD PRESSURE: 90 MMHG

## 2018-04-03 DIAGNOSIS — Z87.39 HISTORY OF GOUT: ICD-10-CM

## 2018-04-03 DIAGNOSIS — F33.0 MILD EPISODE OF RECURRENT MAJOR DEPRESSIVE DISORDER (HCC): ICD-10-CM

## 2018-04-03 DIAGNOSIS — N17.9 ACUTE RENAL FAILURE, UNSPECIFIED ACUTE RENAL FAILURE TYPE (HCC): Primary | ICD-10-CM

## 2018-04-03 DIAGNOSIS — J96.01 ACUTE RESPIRATORY FAILURE WITH HYPOXIA (HCC): ICD-10-CM

## 2018-04-03 DIAGNOSIS — G47.9 DIFFICULTY SLEEPING: ICD-10-CM

## 2018-04-03 DIAGNOSIS — F32.A DEPRESSION, UNSPECIFIED DEPRESSION TYPE: ICD-10-CM

## 2018-04-03 DIAGNOSIS — I10 ESSENTIAL HYPERTENSION: ICD-10-CM

## 2018-04-03 PROBLEM — E11.21 TYPE 2 DIABETES WITH NEPHROPATHY (HCC): Status: ACTIVE | Noted: 2018-04-03

## 2018-04-03 PROBLEM — E66.01 OBESITY, MORBID (HCC): Status: ACTIVE | Noted: 2018-04-03

## 2018-04-03 LAB
ALBUMIN UR QL STRIP: NORMAL MG/L
CREATININE, URINE POC: NORMAL MG/DL
MICROALBUMIN/CREAT RATIO POC: >300 MG/G

## 2018-04-03 RX ORDER — ALLOPURINOL 100 MG/1
200 TABLET ORAL DAILY
Qty: 30 TAB | Refills: 0 | Status: SHIPPED | OUTPATIENT
Start: 2018-04-03 | End: 2018-04-20 | Stop reason: SDUPTHER

## 2018-04-03 RX ORDER — INSULIN GLARGINE 100 [IU]/ML
INJECTION, SOLUTION SUBCUTANEOUS
Qty: 3 ML | Refills: 2 | Status: SHIPPED | OUTPATIENT
Start: 2018-04-03 | End: 2019-04-08 | Stop reason: ALTCHOICE

## 2018-04-03 RX ORDER — AMLODIPINE BESYLATE 5 MG/1
5 TABLET ORAL DAILY
Qty: 30 TAB | Refills: 0 | Status: SHIPPED | OUTPATIENT
Start: 2018-04-03 | End: 2018-05-01 | Stop reason: SDUPTHER

## 2018-04-03 RX ORDER — LANOLIN ALCOHOL/MO/W.PET/CERES
3 CREAM (GRAM) TOPICAL
Qty: 30 TAB | Refills: 0 | Status: SHIPPED | OUTPATIENT
Start: 2018-04-03 | End: 2021-12-27

## 2018-04-03 RX ORDER — ASPIRIN 81 MG/1
81 TABLET ORAL DAILY
Qty: 30 TAB | Refills: 0 | Status: SHIPPED | OUTPATIENT
Start: 2018-04-03 | End: 2021-12-27

## 2018-04-03 RX ORDER — SERTRALINE HYDROCHLORIDE 25 MG/1
25 TABLET, FILM COATED ORAL DAILY
Qty: 30 TAB | Refills: 0 | Status: SHIPPED | OUTPATIENT
Start: 2018-04-03 | End: 2018-05-01 | Stop reason: SDUPTHER

## 2018-04-03 RX ORDER — BLOOD SUGAR DIAGNOSTIC
STRIP MISCELLANEOUS
Qty: 90 PEN NEEDLE | Refills: 1 | Status: SHIPPED | OUTPATIENT
Start: 2018-04-03 | End: 2020-01-20

## 2018-04-03 RX ORDER — METFORMIN HYDROCHLORIDE 500 MG/1
500 TABLET ORAL 2 TIMES DAILY WITH MEALS
Qty: 60 TAB | Refills: 2 | Status: SHIPPED | OUTPATIENT
Start: 2018-04-03 | End: 2018-05-01 | Stop reason: SDUPTHER

## 2018-04-03 RX ORDER — LISINOPRIL 10 MG/1
10 TABLET ORAL DAILY
Qty: 30 TAB | Refills: 0 | Status: SHIPPED | OUTPATIENT
Start: 2018-04-03 | End: 2018-05-01 | Stop reason: SDUPTHER

## 2018-04-03 RX ORDER — LANCETS
EACH MISCELLANEOUS
Qty: 1 EACH | Refills: 11 | Status: SHIPPED | OUTPATIENT
Start: 2018-04-03 | End: 2018-05-29 | Stop reason: SDUPTHER

## 2018-04-03 NOTE — PROGRESS NOTES
Subjective:     Lucien Escobar is a 58 y.o. male who presents today with the following:  Chief Complaint   Patient presents with   [facility] Follow Up     Patient with PMH HTN, Gout, Depression, DM2 presents as new patient in follow up from hospitalization for acute respiratory failure secondary to acute bronchitis. Patient had been sick for several weeks and went to [facility], where he was found to be hypoxic and so was sent to the ER from there. Patient was hospitalized from 3/19 through 3/22 at [id number] [facility]. While inpatient was treated with Rocephin and discharged home on azithromycin and prednisone taper. He was also found to be an uncontrolled diabetic, A1c was 11.7 in the hospital.  Previously had been told he was \"borderline\" diabetic. He was given some diabetes teaching and discharged home on 12 units of Lantus nightly. Since coming home patient feels much better overall. Completed antibiotic and prednisone taper. Feels his breathing is back to normal.  Has remote smoking history, quit 30 years ago. Does tend to get sick once a year or so, but this time just could not clear symptoms, but now feels well. Thinks a lot of how he was feeling was related to undiagnosed diabetes. DM2  Pt here for follow up of diabetes mellitus type 2. He also has hypertension and hyperlipidemia. Diabetic Review of Systems - medication compliance: compliant all of the time, diabetic diet compliance: compliant most of the time, home glucose monitoring: is performed regularly, fasting values range 120-150s, nonfasting values range 180-225s. He is trying to be compliant with meals but is still figuring out what drives up sugars, etc.   Has cut out all soft drinks, has cut out bread. Eating fewer potatos. Eating a little bit of pasta. Exercising: starting exercising more in the last few months. Eye exam approximately 1 month ago with Dr. Alyse Gonzalez. Taking 12 units of Lantus.  Would like to get off of Lantus. Open to starting Metformin. ROS:  Gen: denies fever, chills, fatigue, weight loss, weight gain  HEENT:denies blurry vision, nasal congestion, sore throat  Resp: denies dypsnea, cough, wheezing  CV: denies chest pain, palpitations, lower extremity edema  Abd: denies nausea, vomiting, diarrhea, constipation  Neuro: denies numbness/tingling  Endo: denies polyuria, polydipsia, heat/cold intolerance  Heme: no lymphadenopathy    No Known Allergies      Current Outpatient Prescriptions:     metFORMIN (GLUCOPHAGE) 500 mg tablet, Take 1 Tab by mouth two (2) times daily (with meals). , Disp: 60 Tab, Rfl: 2    allopurinol (ZYLOPRIM) 100 mg tablet, Take 2 Tabs by mouth daily. , Disp: 30 Tab, Rfl: 0    amLODIPine (NORVASC) 5 mg tablet, Take 1 Tab by mouth daily. , Disp: 30 Tab, Rfl: 0    aspirin delayed-release 81 mg tablet, Take 1 Tab by mouth daily. , Disp: 30 Tab, Rfl: 0    lisinopril (PRINIVIL, ZESTRIL) 10 mg tablet, Take 1 Tab by mouth daily. , Disp: 30 Tab, Rfl: 0    melatonin 3 mg tablet, Take 1 Tab by mouth nightly as needed. , Disp: 30 Tab, Rfl: 0    sertraline (ZOLOFT) 25 mg tablet, Take 1 Tab by mouth daily. , Disp: 30 Tab, Rfl: 0    Lancets misc, Check blood glucose fasting and at bed time, Disp: 1 Each, Rfl: 11    glucose blood VI test strips (BLOOD GLUCOSE TEST) strip, EZ Contour blood glucose strips.   Check blood glucose fasting and QHS, Disp: 100 Strip, Rfl: 2    insulin glargine (LANTUS,BASAGLAR) 100 unit/mL (3 mL) inpn, Inject 12 units SQ daily, Disp: 3 mL, Rfl: 2    Insulin Needles, Disposable, (BD ULTRA-FINE MICRO PEN NEEDLE) 32 gauge x 1/4\" ndle, Inject insulin SQ daily, Disp: 90 Pen Needle, Rfl: 1    insulin glargine (LANTUS) 100 unit/mL injection, 12 Units by SubCUTAneous route nightly., Disp: 1 Vial, Rfl: 0    Insulin Needles, Disposable, 31 gauge x 5/16\" ndle, by SubCUTAneous route nightly., Disp: 1 Package, Rfl: 0    guaiFENesin-codeine (ROBITUSSIN AC) 100-10 mg/5 mL solution, Take 5-10 mL by mouth every four (4) hours as needed for Cough. Max Daily Amount: 60 mL., Disp: 80 mL, Rfl: 0    predniSONE (DELTASONE) 10 mg tablet, Take 2 Tabs by mouth daily (with breakfast). 1 tablet once a day for 2 days then 1/2 tablet once a day for 2 days, Disp: 3 Tab, Rfl: 0    Nebulizer Accessories kit, by Nebulization route every six (6) hours as needed for Cough. , Disp: 1 Kit, Rfl: 0    Past Medical History:   Diagnosis Date    Hypertension        Past Surgical History:   Procedure Laterality Date    HX LITHOTRIPSY         History   Smoking Status    Former Smoker   Smokeless Tobacco    Never Used       Social History     Social History    Marital status:      Spouse name: N/A    Number of children: N/A    Years of education: N/A     Social History Main Topics    Smoking status: Former Smoker    Smokeless tobacco: Never Used    Alcohol use Yes    Drug use: None    Sexual activity: Not Asked     Other Topics Concern    None     Social History Narrative       History reviewed. No pertinent family history. Objective:     Visit Vitals    /83 (BP 1 Location: Left arm, BP Patient Position: Sitting)    Pulse (!) 102    Temp 97.9 °F (36.6 °C)    Resp 16    Ht 5' 5\" (1.651 m)    Wt 241 lb 9.6 oz (109.6 kg)    SpO2 92%    BMI 40.2 kg/m2     Wt Readings from Last 3 Encounters:   04/03/18 241 lb 9.6 oz (109.6 kg)   03/21/18 248 lb 9.6 oz (112.8 kg)       Gen: alert, oriented: normocephalic, atraumatic  Eyes:sclera clear, conjunctiva clear  Oral: moist mucus membranes, no oral lesions, no pharyngeal exudate, no nted, no acute distress  Heapharyngeal erythema  Neck: symmetric normal sized thyroid, no carotid bruits, no JVD  Resp: Normal work of breathing, lungs CTAB, no w/r/r  CV: S1, S2 normal.  No murmurs, rubs, or gallops. Abd:  Normal bowel sounds. Soft, not tender, not distended.     Skin: no rash             Extremities: no edema    Results for orders placed or performed during the hospital encounter of 03/19/18   CULTURE, BLOOD, PAIRED   Result Value Ref Range    Special Requests: NO SPECIAL REQUESTS      Culture result: NO GROWTH 5 DAYS     CBC WITH AUTOMATED DIFF   Result Value Ref Range    WBC 11.6 (H) 4.1 - 11.1 K/uL    RBC 5.16 4.10 - 5.70 M/uL    HGB 14.7 12.1 - 17.0 g/dL    HCT 44.1 36.6 - 50.3 %    MCV 85.5 80.0 - 99.0 FL    MCH 28.5 26.0 - 34.0 PG    MCHC 33.3 30.0 - 36.5 g/dL    RDW 14.2 11.5 - 14.5 %    PLATELET 396 151 - 852 K/uL    MPV 9.6 8.9 - 12.9 FL    NRBC 0.0 0  WBC    ABSOLUTE NRBC 0.00 0.00 - 0.01 K/uL    NEUTROPHILS 71 32 - 75 %    LYMPHOCYTES 13 12 - 49 %    MONOCYTES 13 5 - 13 %    EOSINOPHILS 2 0 - 7 %    BASOPHILS 0 0 - 1 %    IMMATURE GRANULOCYTES 2 (H) 0.0 - 0.5 %    ABS. NEUTROPHILS 8.2 (H) 1.8 - 8.0 K/UL    ABS. LYMPHOCYTES 1.5 0.8 - 3.5 K/UL    ABS. MONOCYTES 1.4 (H) 0.0 - 1.0 K/UL    ABS. EOSINOPHILS 0.2 0.0 - 0.4 K/UL    ABS. BASOPHILS 0.0 0.0 - 0.1 K/UL    ABS. IMM. GRANS. 0.2 (H) 0.00 - 0.04 K/UL    DF AUTOMATED     METABOLIC PANEL, COMPREHENSIVE   Result Value Ref Range    Sodium 132 (L) 136 - 145 mmol/L    Potassium 3.7 3.5 - 5.1 mmol/L    Chloride 93 (L) 97 - 108 mmol/L    CO2 31 21 - 32 mmol/L    Anion gap 8 5 - 15 mmol/L    Glucose 372 (H) 65 - 100 mg/dL    BUN 36 (H) 6 - 20 MG/DL    Creatinine 1.59 (H) 0.70 - 1.30 MG/DL    BUN/Creatinine ratio 23 (H) 12 - 20      GFR est AA 54 (L) >60 ml/min/1.73m2    GFR est non-AA 44 (L) >60 ml/min/1.73m2    Calcium 9.3 8.5 - 10.1 MG/DL    Bilirubin, total 0.8 0.2 - 1.0 MG/DL    ALT (SGPT) 40 12 - 78 U/L    AST (SGOT) 22 15 - 37 U/L    Alk.  phosphatase 110 45 - 117 U/L    Protein, total 7.3 6.4 - 8.2 g/dL    Albumin 3.9 3.5 - 5.0 g/dL    Globulin 3.4 2.0 - 4.0 g/dL    A-G Ratio 1.1 1.1 - 2.2     CK W/ CKMB & INDEX   Result Value Ref Range     39 - 308 U/L    CK - MB 2.7 <3.6 NG/ML    CK-MB Index 1.6 0 - 2.5     NT-PRO BNP   Result Value Ref Range    NT pro-BNP 65 0 - 125 PG/ML TROPONIN I   Result Value Ref Range    Troponin-I, Qt. <0.04 <0.05 ng/mL   LACTIC ACID   Result Value Ref Range    Lactic acid 2.7 (HH) 0.4 - 2.0 MMOL/L   LACTIC ACID   Result Value Ref Range    Lactic acid 5.6 (HH) 0.4 - 2.0 MMOL/L   LACTIC ACID   Result Value Ref Range    Lactic acid 5.4 (HH) 0.4 - 2.0 MMOL/L   METABOLIC PANEL, BASIC   Result Value Ref Range    Sodium 132 (L) 136 - 145 mmol/L    Potassium 4.3 3.5 - 5.1 mmol/L    Chloride 98 97 - 108 mmol/L    CO2 22 21 - 32 mmol/L    Anion gap 12 5 - 15 mmol/L    Glucose 618 (HH) 65 - 100 mg/dL    BUN 39 (H) 6 - 20 MG/DL    Creatinine 1.93 (H) 0.70 - 1.30 MG/DL    BUN/Creatinine ratio 20 12 - 20      GFR est AA 43 (L) >60 ml/min/1.73m2    GFR est non-AA 35 (L) >60 ml/min/1.73m2    Calcium 8.9 8.5 - 10.1 MG/DL   CBC WITH AUTOMATED DIFF   Result Value Ref Range    WBC 8.8 4.1 - 11.1 K/uL    RBC 4.85 4.10 - 5.70 M/uL    HGB 14.0 12.1 - 17.0 g/dL    HCT 42.3 36.6 - 50.3 %    MCV 87.2 80.0 - 99.0 FL    MCH 28.9 26.0 - 34.0 PG    MCHC 33.1 30.0 - 36.5 g/dL    RDW 14.2 11.5 - 14.5 %    PLATELET 495 097 - 353 K/uL    MPV 9.8 8.9 - 12.9 FL    NRBC 0.0 0  WBC    ABSOLUTE NRBC 0.00 0.00 - 0.01 K/uL    NEUTROPHILS 90 (H) 32 - 75 %    LYMPHOCYTES 5 (L) 12 - 49 %    MONOCYTES 4 (L) 5 - 13 %    EOSINOPHILS 0 0 - 7 %    BASOPHILS 0 0 - 1 %    IMMATURE GRANULOCYTES 1 (H) 0.0 - 0.5 %    ABS. NEUTROPHILS 7.9 1.8 - 8.0 K/UL    ABS. LYMPHOCYTES 0.4 (L) 0.8 - 3.5 K/UL    ABS. MONOCYTES 0.4 0.0 - 1.0 K/UL    ABS. EOSINOPHILS 0.0 0.0 - 0.4 K/UL    ABS. BASOPHILS 0.0 0.0 - 0.1 K/UL    ABS. IMM.  GRANS. 0.1 (H) 0.00 - 0.04 K/UL    DF SMEAR SCANNED      RBC COMMENTS NORMOCYTIC, NORMOCHROMIC     LACTIC ACID   Result Value Ref Range    Lactic acid 4.5 (HH) 0.4 - 2.0 MMOL/L   HEMOGLOBIN A1C WITH EAG   Result Value Ref Range    Hemoglobin A1c 11.7 (H) 4.2 - 6.3 %    Est. average glucose 289 mg/dL   LACTIC ACID   Result Value Ref Range    Lactic acid 4.1 (HH) 0.4 - 2.0 MMOL/L URINALYSIS W/MICROSCOPIC   Result Value Ref Range    Color YELLOW/STRAW      Appearance CLEAR CLEAR      Specific gravity 1.010 1.003 - 1.030      pH (UA) 5.0 5.0 - 8.0      Protein TRACE (A) NEG mg/dL    Glucose >1000 (A) NEG mg/dL    Ketone TRACE (A) NEG mg/dL    Bilirubin NEGATIVE  NEG      Blood SMALL (A) NEG      Urobilinogen 0.2 0.2 - 1.0 EU/dL    Nitrites NEGATIVE  NEG      Leukocyte Esterase NEGATIVE  NEG      WBC 0-4 0 - 4 /hpf    RBC 0-5 0 - 5 /hpf    Epithelial cells FEW FEW /lpf    Bacteria NEGATIVE  NEG /hpf    Hyaline cast 0-2 0 - 5 /lpf   METABOLIC PANEL, BASIC   Result Value Ref Range    Sodium 136 136 - 145 mmol/L    Potassium 4.1 3.5 - 5.1 mmol/L    Chloride 104 97 - 108 mmol/L    CO2 24 21 - 32 mmol/L    Anion gap 8 5 - 15 mmol/L    Glucose 419 (H) 65 - 100 mg/dL    BUN 31 (H) 6 - 20 MG/DL    Creatinine 1.33 (H) 0.70 - 1.30 MG/DL    BUN/Creatinine ratio 23 (H) 12 - 20      GFR est AA >60 >60 ml/min/1.73m2    GFR est non-AA 54 (L) >60 ml/min/1.73m2    Calcium 8.6 8.5 - 10.1 MG/DL   LACTIC ACID   Result Value Ref Range    Lactic acid 1.5 0.4 - 2.0 MMOL/L   METABOLIC PANEL, BASIC   Result Value Ref Range    Sodium 136 136 - 145 mmol/L    Potassium 3.7 3.5 - 5.1 mmol/L    Chloride 104 97 - 108 mmol/L    CO2 24 21 - 32 mmol/L    Anion gap 8 5 - 15 mmol/L    Glucose 318 (H) 65 - 100 mg/dL    BUN 29 (H) 6 - 20 MG/DL    Creatinine 1.26 0.70 - 1.30 MG/DL    BUN/Creatinine ratio 23 (H) 12 - 20      GFR est AA >60 >60 ml/min/1.73m2    GFR est non-AA 58 (L) >60 ml/min/1.73m2    Calcium 8.6 8.5 - 10.1 MG/DL     Results for orders placed or performed in visit on 04/03/18   AMB POC URINE, MICROALBUMIN, SEMIQUANT (3 RESULTS)   Result Value Ref Range    ALBUMIN, URINE POC 150mg mg/L    CREATININE, URINE POC 100mg mg/dL    Microalbumin/creat ratio (POC) >300 <30 MG/G       Assessment/ Plan:   Diagnoses and all orders for this visit:    1.  Acute renal failure, unspecified acute renal failure type ([facility] [address]  -resolved; recheck CMP today    2. Acute respiratory failure with hypoxia (HCC)  -     CBC WITH AUTOMATED DIFF  - pt has nebs at home if needed    3. Essential hypertension  -     METABOLIC PANEL, COMPREHENSIVE  -     LIPID PANEL  -     TSH 3RD GENERATION  -     amLODIPine (NORVASC) 5 mg tablet; Take 1 Tab by mouth daily. -     aspirin delayed-release 81 mg tablet; Take 1 Tab by mouth daily. -     lisinopril (PRINIVIL, ZESTRIL) 10 mg tablet; Take 1 Tab by mouth daily. -BP improved on recheck, will monitor    4. Mild episode of recurrent major depressive disorder (HCC)  -     sertraline (ZOLOFT) 25 mg tablet; Take 1 Tab by mouth daily. 5. Uncontrolled type 2 diabetes mellitus, with long-term current use of insulin ([facility])  -     CBC WITH AUTOMATED DIFF  -     METABOLIC PANEL, COMPREHENSIVE  -     LIPID PANEL  -     TSH 3RD GENERATION  -     metFORMIN (GLUCOPHAGE) 500 mg tablet; Take 1 Tab by mouth two (2) times daily (with meals). -     AMB POC URINE, MICROALBUMIN, SEMIQUANT (3 RESULTS)  -     aspirin delayed-release 81 mg tablet; Take 1 Tab by mouth daily.  -     Lancets misc; Check blood glucose fasting and at bed time  -     glucose blood VI test strips (BLOOD GLUCOSE TEST) strip; EZ Contour blood glucose strips. Check blood glucose fasting and QHS  -     insulin glargine (LANTUS,BASAGLAR) 100 unit/mL (3 mL) inpn; Inject 12 units SQ daily  -     Insulin Needles, Disposable, (BD ULTRA-FINE MICRO PEN NEEDLE) 32 gauge x 1/4\" ndle; Inject insulin SQ daily    Add on Metformin. Medication side effect profile discussed with patient along with reasons to stop medication or call physician. Patient voiced understanding of treatment and plan. Will plan to cut back on Lantus if BS continue to stablize over the next month. Pt is making big changes to diet and lifestyle, so may be able to come off of Lantus. Follow up 1 month with BS log.     6.  BMI 40.0-44.9, adult ([facility])  Healthy balanced diet low in carbohydrates, saturated fats and red meats and rich in fiber, protein, fruits and vegetables recommended. Recommended 30 minutes cardiovascular exercise such as brisk walking/running at least 3-5x a week. 7. Depression, unspecified depression type  -     sertraline (ZOLOFT) 25 mg tablet; Take 1 Tab by mouth daily. 8. Difficulty sleeping  -     melatonin 3 mg tablet; Take 1 Tab by mouth nightly as needed. 9. History of gout  -     allopurinol (ZYLOPRIM) 100 mg tablet; Take 2 Tabs by mouth daily. Verbal and written instructions (see AVS) provided.  Patient expresses understanding of diagnosis and treatment plan. Arlen Morelos.  Amy Rodriguez MD

## 2018-04-03 NOTE — TELEPHONE ENCOUNTER
Pt is calling to let dr know the name of the meter he states it is a 1 touch Verio meter and he states dr was going to call in his

## 2018-04-03 NOTE — PATIENT INSTRUCTIONS
Learning About Metformin for Type 2 Diabetes  Introduction    Metformin (such as Glucophage) is a medicine used to treat type 2 diabetes. It helps keep blood sugar levels on target. You may have tried to eat a healthy diet, lose weight, and get more exercise to keep your blood sugar in your target range. If those things do not help, you may take a medicine called metformin. It helps your body use insulin. This can help you control your blood sugar. You might take it on its own or with other medicines. When taken on its own, metformin should not cause low blood sugar or weight gain. Example  · Metformin (Glucophage)  Possible side effects  Common side effects include:  · Short-term nausea. · Not feeling hungry. · Diarrhea. · Increased gas in your belly. · A metallic taste. You may have side effects or reactions not listed here. Check the information that comes with your medicine. What to know about taking this medicine  · Metformin does not usually cause low blood sugar. But you may get a low blood sugar when you take metformin and you exercise hard, drink alcohol, or you do not eat enough food. · Sometimes metformin is combined with other diabetes medicine. Some of these can cause low blood sugar. · If you need a test that uses a dye or you need to have surgery, be sure to tell all of your doctors that you take metformin. You may have to stop taking it before and after the test or surgery. · Over time, blood levels of vitamin B12 can decrease in some people who take metformin. Your body needs this B vitamin to make blood cells. It also keeps your nervous system healthy. If you have been taking metformin for more than a few years, ask your doctor if you need a B12 blood test to measure the amount of vitamin B12 in your blood. · Be safe with medicines. Take your medicines exactly as prescribed. Call your doctor if you think you are having a problem with your medicine.   · Check with your doctor or pharmacist before you use any other medicines. This includes over-the-counter medicines. Make sure your doctor knows all of the medicines, vitamins, herbal products, and supplements you take. Taking some medicines together can cause problems. Where can you learn more? Go to http://merari-paul.info/. Enter Олег Huynh in the search box to learn more about \"Learning About Metformin for Type 2 Diabetes. \"  Current as of: March 13, 2017  Content Version: 11.4  © 2066-3663 [employer], [employer]. Care instructions adapted under license by [employer] (which disclaims liability or warranty for this information). If you have questions about a medical condition or this instruction, always ask your healthcare professional. [address] any warranty or liability for your use of this information.

## 2018-04-03 NOTE — MR AVS SNAPSHOT
Rolanda Martin 
 
 
 [address] [place] [address] 
[phone] Patient: Edi Montenegro MRN: [id number] DIV:1/85/6739 Visit Information Date & Time Provider Department Dept. Phone Encounter #  
 4/3/2018 10:30 AM Linda Smallwood MD [address] [facility] [phone] [id number] Follow-up Instructions Return in about 1 month (around 5/3/2018). Upcoming Health Maintenance Date Due Hepatitis C Screening 1955 LIPID PANEL Q1 1955 FOOT EXAM Q1 5/17/1965 MICROALBUMIN Q1 5/17/1965 EYE EXAM RETINAL OR DILATED Q1 5/17/1965 Pneumococcal 19-64 Highest Risk (1 of 3 - PCV13) 5/17/1974 DTaP/Tdap/Td series (1 - Tdap) 5/17/1976 FOBT Q 1 YEAR AGE 50-75 5/17/2005 ZOSTER VACCINE AGE 60> 3/17/2015 Influenza Age 5 to Adult 8/1/2017 HEMOGLOBIN A1C Q6M 9/20/2018 Allergies as of 4/3/2018  Review Complete On: 4/3/2018 By: Mani Jenkins No Known Allergies Current Immunizations  Never Reviewed No immunizations on file. Not reviewed this visit You Were Diagnosed With   
  
 Codes Comments Acute renal failure, unspecified acute renal failure type ([facility]    -  Primary ICD-10-CM: N17.9 ICD-9-CM: 651. 9 Acute respiratory failure with hypoxia (HCC)     ICD-10-CM: J96.01 
ICD-9-CM: 518.81 Essential hypertension     ICD-10-CM: I10 
ICD-9-CM: 401.9 Mild episode of recurrent major depressive disorder (HCC)     ICD-10-CM: F33.0 ICD-9-CM: 296.31 Uncontrolled type 2 diabetes mellitus with hyperosmolar coma, with long-term current use of insulin (HCC)     ICD-10-CM: E11.01, Z79.4 ICD-9-CM: 250.22, V58.67 BMI 40.0-44.9, adult [facility])     ICD-10-CM: Z68.41 
ICD-9-CM: V85.41 Vitals BP Pulse Temp Resp Height(growth percentile) Weight(growth percentile)  147/83 (BP 1 Location: Left arm, BP Patient Position: Sitting) (!) 102 97.9 °F (36.6 °C) 16 5' 5\" (1.651 m) 241 lb 9.6 oz (109.6 kg) SpO2 BMI Smoking Status 92% 40.2 kg/m2 Former Smoker Vitals History BMI and BSA Data Body Mass Index Body Surface Area  
 40.2 kg/m 2 2.24 m 2 Preferred Pharmacy Pharmacy Name Phone [facility] [phone] [address], [address] AT [place] [address] [phone] Your Updated Medication List  
  
   
This list is accurate as of 4/3/18 11:18 AM.  Always use your most recent med list.  
  
  
  
  
 allopurinol 100 mg tablet Commonly known as:  Moe Snowman Take 2 Tabs by mouth daily. amLODIPine 5 mg tablet Commonly known as:  Tammi Grebe Take 1 Tab by mouth daily. aspirin delayed-release 81 mg tablet Take 1 Tab by mouth daily. guaiFENesin-codeine 100-10 mg/5 mL solution Commonly known as:  ROBITUSSIN AC Take 5-10 mL by mouth every four (4) hours as needed for Cough. Max Daily Amount: 60 mL. insulin glargine 100 unit/mL injection Commonly known as:  LANTUS  
12 Units by SubCUTAneous route nightly. Insulin Needles (Disposable) 31 gauge x 5/16\" Ndle  
by SubCUTAneous route nightly. lisinopril 10 mg tablet Commonly known as:  Valene Pencil Take 1 Tab by mouth daily. melatonin 3 mg tablet Take 1 Tab by mouth nightly as needed. metFORMIN 500 mg tablet Commonly known as:  GLUCOPHAGE Take 1 Tab by mouth two (2) times daily (with meals). Nebulizer Accessories Kit  
by Nebulization route every six (6) hours as needed for Cough. predniSONE 10 mg tablet Commonly known as:  Navneet Fast Take 2 Tabs by mouth daily (with breakfast). 1 tablet once a day for 2 days then 1/2 tablet once a day for 2 days  
  
 sertraline 25 mg tablet Commonly known as:  ZOLOFT Take 1 Tab by mouth daily. Prescriptions Sent to Pharmacy  Refills  
 metFORMIN (GLUCOPHAGE) 500 mg tablet 2  
 Sig: Take 1 Tab by mouth two (2) times daily (with meals). Class: Normal  
 Pharmacy: [facility] [address] [place] [address] #: [phone] Route: Oral  
  
We Performed the Following AMB POC URINE, MICROALBUMIN, SEMIQUANT (3 RESULTS) [63693 CPT(R)] CBC WITH AUTOMATED DIFF [54340 CPT(R)] LIPID PANEL [78178 CPT(R)] METABOLIC PANEL, COMPREHENSIVE [68912 CPT(R)] TSH 3RD GENERATION [74297 CPT(R)] Follow-up Instructions Return in about 1 month (around 5/3/2018). To-Do List   
 04/13/2018 1:00 PM  
  Appointment with SURVIVAL SKILLS CLASS [facility] at [facility] ([phone]) Patient Instructions Learning About Metformin for Type 2 Diabetes Introduction Metformin (such as Glucophage) is a medicine used to treat type 2 diabetes. It helps keep blood sugar levels on target. You may have tried to eat a healthy diet, lose weight, and get more exercise to keep your blood sugar in your target range. If those things do not help, you may take a medicine called metformin. It helps your body use insulin. This can help you control your blood sugar. You might take it on its own or with other medicines. When taken on its own, metformin should not cause low blood sugar or weight gain. Example · Metformin (Glucophage) Possible side effects Common side effects include: · Short-term nausea. · Not feeling hungry. · Diarrhea. · Increased gas in your belly. · A metallic taste. You may have side effects or reactions not listed here. Check the information that comes with your medicine. What to know about taking this medicine · Metformin does not usually cause low blood sugar. But you may get a low blood sugar when you take metformin and you exercise hard, drink alcohol, or you do not eat enough food. · Sometimes metformin is combined with other diabetes medicine.  Some of these can cause low blood sugar. · If you need a test that uses a dye or you need to have surgery, be sure to tell all of your doctors that you take metformin. You may have to stop taking it before and after the test or surgery. · Over time, blood levels of vitamin B12 can decrease in some people who take metformin. Your body needs this B vitamin to make blood cells. It also keeps your nervous system healthy. If you have been taking metformin for more than a few years, ask your doctor if you need a B12 blood test to measure the amount of vitamin B12 in your blood. · Be safe with medicines. Take your medicines exactly as prescribed. Call your doctor if you think you are having a problem with your medicine. · Check with your doctor or pharmacist before you use any other medicines. This includes over-the-counter medicines. Make sure your doctor knows all of the medicines, vitamins, herbal products, and supplements you take. Taking some medicines together can cause problems. Where can you learn more? Go to http://merari-paul.info/. Enter Олег Argue in the search box to learn more about \"Learning About Metformin for Type 2 Diabetes. \" Current as of: March 13, 2017 Content Version: 11.4 © 5395-8368 [employer]. Care instructions adapted under license by [employer] (which disclaims liability or warranty for this information). If you have questions about a medical condition or this instruction, always ask your healthcare professional. [place] [address] any warranty or liability for your use of this information. Introducing [facility] & HEALTH SERVICES! [employer] introduces LearnUpon patient portal. Now you can access parts of your medical record, email your doctor's office, and request medication refills online. 1. In your internet browser, go to [email or web address] 2. Click on the First Time User? Click Here link in the Sign In box.  You will see the New Member Sign Up page. 3. Enter your [employer] Access Code exactly as it appears below. You will not need to use this code after youve completed the sign-up process. If you do not sign up before the expiration date, you must request a new code. · [employer] Access Code: [email or web address] Expires: 6/17/2018 11:22 AM 
 
4. Enter the last four digits of your Social Security Number (xxxx) and Date of Birth (mm/dd/yyyy) as indicated and click Submit. You will be taken to the next sign-up page. 5. Create a [employer] ID. This will be your [employer] login ID and cannot be changed, so think of one that is secure and easy to remember. 6. Create a [employer] password. You can change your password at any time. 7. Enter your Password Reset Question and Answer. This can be used at a later time if you forget your password. 8. Enter your e-mail address. You will receive e-mail notification when new information is available in [address]. 9. Click Sign Up. You can now view and download portions of your medical record. 10. Click the Download Summary menu link to download a portable copy of your medical information. If you have questions, please visit the Frequently Asked Questions section of the [employer] website. Remember, [employer] is NOT to be used for urgent needs. For medical emergencies, dial 911. Now available from your iPhone and Android! Please provide this summary of care documentation to your next provider. Your primary care clinician is listed as Selwyn Patiño. If you have any questions after today's visit, please call [phone].

## 2018-04-04 LAB
ALBUMIN SERPL-MCNC: 4.3 G/DL (ref 3.6–4.8)
ALBUMIN/GLOB SERPL: 1.8 {RATIO} (ref 1.2–2.2)
ALP SERPL-CCNC: 56 IU/L (ref 39–117)
ALT SERPL-CCNC: 44 IU/L (ref 0–44)
AST SERPL-CCNC: 31 IU/L (ref 0–40)
BASOPHILS # BLD AUTO: 0 X10E3/UL (ref 0–0.2)
BASOPHILS NFR BLD AUTO: 0 %
BILIRUB SERPL-MCNC: 0.8 MG/DL (ref 0–1.2)
BUN SERPL-MCNC: 16 MG/DL (ref 8–27)
BUN/CREAT SERPL: 13 (ref 10–24)
CALCIUM SERPL-MCNC: 9.7 MG/DL (ref 8.6–10.2)
CHLORIDE SERPL-SCNC: 101 MMOL/L (ref 96–106)
CHOLEST SERPL-MCNC: 176 MG/DL (ref 100–199)
CO2 SERPL-SCNC: 24 MMOL/L (ref 18–29)
CREAT SERPL-MCNC: 1.25 MG/DL (ref 0.76–1.27)
EOSINOPHIL # BLD AUTO: 0.2 X10E3/UL (ref 0–0.4)
EOSINOPHIL NFR BLD AUTO: 3 %
ERYTHROCYTE [DISTWIDTH] IN BLOOD BY AUTOMATED COUNT: 15.5 % (ref 12.3–15.4)
GFR SERPLBLD CREATININE-BSD FMLA CKD-EPI: 61 ML/MIN/1.73
GFR SERPLBLD CREATININE-BSD FMLA CKD-EPI: 71 ML/MIN/1.73
GLOBULIN SER CALC-MCNC: 2.4 G/DL (ref 1.5–4.5)
GLUCOSE SERPL-MCNC: 101 MG/DL (ref 65–99)
HCT VFR BLD AUTO: 40.3 % (ref 37.5–51)
HDLC SERPL-MCNC: 43 MG/DL
HGB BLD-MCNC: 13.3 G/DL (ref 13–17.7)
IMM GRANULOCYTES # BLD: 0.1 X10E3/UL (ref 0–0.1)
IMM GRANULOCYTES NFR BLD: 1 %
INTERPRETATION, 910389: NORMAL
LDLC SERPL CALC-MCNC: 107 MG/DL (ref 0–99)
LYMPHOCYTES # BLD AUTO: 1.1 X10E3/UL (ref 0.7–3.1)
LYMPHOCYTES NFR BLD AUTO: 20 %
Lab: NORMAL
MCH RBC QN AUTO: 28.4 PG (ref 26.6–33)
MCHC RBC AUTO-ENTMCNC: 33 G/DL (ref 31.5–35.7)
MCV RBC AUTO: 86 FL (ref 79–97)
MONOCYTES # BLD AUTO: 0.7 X10E3/UL (ref 0.1–0.9)
MONOCYTES NFR BLD AUTO: 12 %
NEUTROPHILS # BLD AUTO: 3.4 X10E3/UL (ref 1.4–7)
NEUTROPHILS NFR BLD AUTO: 64 %
PLATELET # BLD AUTO: 194 X10E3/UL (ref 150–379)
POTASSIUM SERPL-SCNC: 4.1 MMOL/L (ref 3.5–5.2)
PROT SERPL-MCNC: 6.7 G/DL (ref 6–8.5)
RBC # BLD AUTO: 4.69 X10E6/UL (ref 4.14–5.8)
SODIUM SERPL-SCNC: 144 MMOL/L (ref 134–144)
TRIGL SERPL-MCNC: 130 MG/DL (ref 0–149)
TSH SERPL DL<=0.005 MIU/L-ACNC: 2.06 UIU/ML (ref 0.45–4.5)
VLDLC SERPL CALC-MCNC: 26 MG/DL (ref 5–40)
WBC # BLD AUTO: 5.4 X10E3/UL (ref 3.4–10.8)

## 2018-04-13 ENCOUNTER — HOSPITAL ENCOUNTER (OUTPATIENT)
Dept: DIABETES SERVICES | Age: 63
Discharge: HOME | End: 2018-04-13
Payer: COMMERCIAL

## 2018-04-13 DIAGNOSIS — E11.9 DIABETES MELLITUS WITHOUT COMPLICATION (HCC): ICD-10-CM

## 2018-04-13 PROCEDURE — G0109 DIAB MANAGE TRN IND/GROUP: HCPCS | Performed by: DIETITIAN, REGISTERED

## 2018-04-13 NOTE — DIABETES MGMT
04/13/18                Dear Kenya Cespedes MD    Your patient, Kaleb Melton, attended our Diabetes Survival skills class after a recent hospitilzation at [place] [address] where the following topics were covered today:    *Incorporating nutrition management into their lifestyle   *Monitoring blood glucose and other parameters and interpreting and using the results for self   management decision making   *Preventing, detecting and treating acute complications   *Incorporating physical activity into lifestyle  *Using medications safely and for maximum therapeutic effectiveness     Data from recent hospital visit:   Lab Results   Component Value Date/Time    Hemoglobin A1c 11.7 (H) 03/20/2018 09:00 AM       Your patient was encouraged to schedule the comprehensive class series so they can continue to manage their diabetes and improve glycemic control and prevent long term compications. We look forward to assisting your patient in meeting their self- management goals.  If you have any questions please do not hesitate to call the [facility] at [phone]      Sincerely, Fernando Buchanan, [address] [place] [address] , Serge RAMIREZ [address].  [address] [place] [address]  [employer]  Phone: [phone]  Fax: [phone]

## 2018-04-18 ENCOUNTER — TELEPHONE (OUTPATIENT)
Dept: FAMILY MEDICINE CLINIC | Age: 63
End: 2018-04-18

## 2018-04-18 NOTE — TELEPHONE ENCOUNTER
HHN states patient is taking diabetes management class now. Patient wants to be discharged from [facility] due to cost. Verbal given for discharge.

## 2018-04-20 DIAGNOSIS — Z87.39 HISTORY OF GOUT: ICD-10-CM

## 2018-04-20 RX ORDER — ALLOPURINOL 100 MG/1
200 TABLET ORAL DAILY
Qty: 60 TAB | Refills: 11 | Status: SHIPPED | OUTPATIENT
Start: 2018-04-20 | End: 2019-04-30 | Stop reason: SDUPTHER

## 2018-04-20 NOTE — TELEPHONE ENCOUNTER
Pt is calling requesting a refill on his med he states he takes 2 a day and he doesn't have enough for weekend can this be done by another provider     Requested Prescriptions     Pending Prescriptions Disp Refills    allopurinol (ZYLOPRIM) 100 mg tablet 30 Tab 0     Sig: Take 2 Tabs by mouth daily.

## 2018-04-23 ENCOUNTER — TELEPHONE (OUTPATIENT)
Dept: FAMILY MEDICINE CLINIC | Age: 63
End: 2018-04-23

## 2018-04-23 RX ORDER — MOMETASONE FUROATE 1 MG/G
CREAM TOPICAL DAILY
Qty: 45 G | Refills: 1 | Status: SHIPPED | OUTPATIENT
Start: 2018-04-23 | End: 2018-12-27 | Stop reason: ALTCHOICE

## 2018-04-23 NOTE — TELEPHONE ENCOUNTER
Patient needs cream for below his waist, eloclin 1% cream. He said the pharmacist told him that this is not otc med anymore, that it needs to be prescribed .

## 2018-05-01 ENCOUNTER — OFFICE VISIT (OUTPATIENT)
Dept: FAMILY MEDICINE CLINIC | Age: 63
End: 2018-05-01

## 2018-05-01 VITALS
SYSTOLIC BLOOD PRESSURE: 130 MMHG | DIASTOLIC BLOOD PRESSURE: 90 MMHG | WEIGHT: 237 LBS | HEART RATE: 89 BPM | OXYGEN SATURATION: 95 % | HEIGHT: 65 IN | TEMPERATURE: 98.5 F | BODY MASS INDEX: 39.49 KG/M2 | RESPIRATION RATE: 16 BRPM

## 2018-05-01 DIAGNOSIS — I10 ESSENTIAL HYPERTENSION: Primary | ICD-10-CM

## 2018-05-01 DIAGNOSIS — F33.0 MILD EPISODE OF RECURRENT MAJOR DEPRESSIVE DISORDER (HCC): ICD-10-CM

## 2018-05-01 DIAGNOSIS — F32.A DEPRESSION, UNSPECIFIED DEPRESSION TYPE: ICD-10-CM

## 2018-05-01 RX ORDER — SERTRALINE HYDROCHLORIDE 25 MG/1
25 TABLET, FILM COATED ORAL DAILY
Qty: 90 TAB | Refills: 1 | Status: SHIPPED | OUTPATIENT
Start: 2018-05-01 | End: 2018-06-21 | Stop reason: SDUPTHER

## 2018-05-01 RX ORDER — AMLODIPINE BESYLATE 10 MG/1
10 TABLET ORAL DAILY
Qty: 90 TAB | Refills: 0 | Status: SHIPPED | OUTPATIENT
Start: 2018-05-01 | End: 2018-05-23 | Stop reason: SDUPTHER

## 2018-05-01 RX ORDER — METFORMIN HYDROCHLORIDE 1000 MG/1
1000 TABLET ORAL 2 TIMES DAILY WITH MEALS
Qty: 180 TAB | Refills: 0 | Status: SHIPPED | OUTPATIENT
Start: 2018-05-01 | End: 2018-06-14 | Stop reason: SDUPTHER

## 2018-05-01 RX ORDER — LISINOPRIL 10 MG/1
10 TABLET ORAL DAILY
Qty: 90 TAB | Refills: 1 | Status: SHIPPED | OUTPATIENT
Start: 2018-05-01 | End: 2018-11-06 | Stop reason: SDUPTHER

## 2018-05-01 NOTE — PROGRESS NOTES
Chief Complaint   Patient presents with    Diabetes    Hypertension     Patient is here for a 1 month follow up.

## 2018-05-01 NOTE — PATIENT INSTRUCTIONS
1. We increased Metformin to 1000 mg.  2. We increase amlodipine (Norvasc) to 10 mg    Insulin:  Decrease to 10 units for the next 4-5 days. If your blood sugars stay where they are now, in 4-5 days you can do down to 8 units.

## 2018-05-01 NOTE — MR AVS SNAPSHOT
[facility] 
 
 
 [address] 
[phone] Patient: Elian Lovelace MRN: [id number] WOW:[birth year] Visit Information Date & Time Provider Department Dept. Phone Encounter #  
 5/1/2018 11:00 AM Birgit Downing MD [address] [facility] [phone] [id number] Upcoming Health Maintenance Date Due Hepatitis C Screening 1955 FOOT EXAM Q1 5/17/1965 DTaP/Tdap/Td series (1 - Tdap) 5/17/1976 FOBT Q 1 YEAR AGE 50-75 5/17/2005 ZOSTER VACCINE AGE 60> 3/17/2015 Pneumococcal 19-64 Highest Risk (2 of 3 - PCV13) 10/3/2017 Influenza Age 5 to Adult 8/1/2018 HEMOGLOBIN A1C Q6M 9/20/2018 EYE EXAM RETINAL OR DILATED Q1 1/31/2019 MICROALBUMIN Q1 4/3/2019 LIPID PANEL Q1 4/3/2019 Allergies as of 5/1/2018  Review Complete On: 5/1/2018 By: Birgit Downing MD  
 No Known Allergies Current Immunizations  Never Reviewed Name Date Pneumococcal Polysaccharide (PPSV-23) 10/3/2016 Not reviewed this visit You Were Diagnosed With   
  
 Codes Comments Essential hypertension    -  Primary ICD-10-CM: I10 
ICD-9-CM: 401.9 Uncontrolled type 2 diabetes mellitus without complication, with long-term current use of insulin (HCC)     ICD-10-CM: E11.65, Z79.4 ICD-9-CM: 250.02, V58.67 Vitals BP Pulse Temp Resp Height(growth percentile) Weight(growth percentile) (!) 156/99 (BP 1 Location: Left arm, BP Patient Position: Sitting) 89 98.5 °F (36.9 °C) (Oral) 16 5' 5\" (1.651 m) 237 lb (107.5 kg) SpO2 BMI Smoking Status 95% 39.44 kg/m2 Former Smoker Vitals History BMI and BSA Data Body Mass Index Body Surface Area  
 39.44 kg/m 2 2.22 m 2 Preferred Pharmacy Pharmacy Name Phone [phone] - [address], [address] [place] [address] [phone] Your Updated Medication List  
  
   
 This list is accurate as of 5/1/18 11:17 AM.  Always use your most recent med list.  
  
  
  
  
 allopurinol 100 mg tablet Commonly known as:  Cade Graham Take 2 Tabs by mouth daily. amLODIPine 10 mg tablet Commonly known as:  Ashley Aguilar Take 1 Tab by mouth daily. aspirin delayed-release 81 mg tablet Take 1 Tab by mouth daily. glucose blood VI test strips strip Commonly known as:  blood glucose test  
EZ Contour blood glucose strips. Check blood glucose fasting and QHS  
  
 guaiFENesin-codeine 100-10 mg/5 mL solution Commonly known as:  ROBITUSSIN AC Take 5-10 mL by mouth every four (4) hours as needed for Cough. Max Daily Amount: 60 mL. * insulin glargine 100 unit/mL injection Commonly known as:  LANTUS  
12 Units by SubCUTAneous route nightly. * insulin glargine 100 unit/mL (3 mL) Inpn Commonly known as:  Ofilia Bosworth Inject 12 units SQ daily * Insulin Needles (Disposable) 31 gauge x 5/16\" Ndle  
by SubCUTAneous route nightly. * Insulin Needles (Disposable) 32 gauge x 1/4\" Ndle Commonly known as:  BD ULTRA-FINE MICRO PEN NEEDLE Inject insulin SQ daily Lancets Misc Check blood glucose fasting and at bed time  
  
 lisinopril 10 mg tablet Commonly known as:  Quintin Handing Take 1 Tab by mouth daily. melatonin 3 mg tablet Take 1 Tab by mouth nightly as needed. metFORMIN 1,000 mg tablet Commonly known as:  GLUCOPHAGE Take 1 Tab by mouth two (2) times daily (with meals). mometasone 0.1 % topical cream  
Commonly known as:  Omelia Brazen Apply  to affected area daily. Nebulizer Accessories Kit  
by Nebulization route every six (6) hours as needed for Cough. sertraline 25 mg tablet Commonly known as:  ZOLOFT Take 1 Tab by mouth daily. * Notice: This list has 4 medication(s) that are the same as other medications prescribed for you.  Read the directions carefully, and ask your doctor or other care provider to review them with you. Prescriptions Sent to Pharmacy Refills  
 metFORMIN (GLUCOPHAGE) 1,000 mg tablet 0 Sig: Take 1 Tab by mouth two (2) times daily (with meals). Class: Normal  
 Pharmacy: [facility] [address] Ph #: [phone] Route: Oral  
 amLODIPine (NORVASC) 10 mg tablet 0 Sig: Take 1 Tab by mouth daily. Class: Normal  
 Pharmacy: [address] Ph #: [phone] Route: Oral  
  
We Performed the Following METABOLIC PANEL, COMPREHENSIVE [42592 CPT(R)] Patient Instructions 1. We increased Metformin to 1000 mg. 
2. We increase amlodipine (Norvasc) to 10 mg Insulin:  Decrease to 10 units for the next 4-5 days. If your blood sugars stay where they are now, in 4-5 days you can do down to 8 units. Introducing [facility] & HEALTH SERVICES! Susi Fitzgerald introduces [employer] patient portal. Now you can access parts of your medical record, email your doctor's office, and request medication refills online. 1. In your internet browser, go to [email or web address] 2. Click on the First Time User? Click Here link in the Sign In box. You will see the New Member Sign Up page. 3. Enter your [employer] Access Code exactly as it appears below. You will not need to use this code after youve completed the sign-up process. If you do not sign up before the expiration date, you must request a new code. · [employer] Access Code: [email or web address] Expires: 6/17/2018 11:22 AM 
 
4. Enter the last four digits of your Social Security Number (xxxx) and Date of Birth (mm/dd/yyyy) as indicated and click Submit. You will be taken to the next sign-up page. 5. Create a [employer] ID.  This will be your [employer] login ID and cannot be changed, so think of one that is secure and easy to remember. 6. Create a [employer] password. You can change your password at any time. 7. Enter your Password Reset Question and Answer. This can be used at a later time if you forget your password. 8. Enter your e-mail address. You will receive e-mail notification when new information is available in [address] 9. Click Sign Up. You can now view and download portions of your medical record. 10. Click the Download Summary menu link to download a portable copy of your medical information. If you have questions, please visit the Frequently Asked Questions section of the [employer] website. Remember, [employer] is NOT to be used for urgent needs. For medical emergencies, dial 911. Now available from your iPhone and Android! Please provide this summary of care documentation to your next provider. Your primary care clinician is listed as Shirin Ceballos. If you have any questions after today's visit, please call [phone].

## 2018-05-02 LAB
ALBUMIN SERPL-MCNC: 4.7 G/DL (ref 3.6–4.8)
ALBUMIN/GLOB SERPL: 2 {RATIO} (ref 1.2–2.2)
ALP SERPL-CCNC: 68 IU/L (ref 39–117)
ALT SERPL-CCNC: 21 IU/L (ref 0–44)
AST SERPL-CCNC: 18 IU/L (ref 0–40)
BILIRUB SERPL-MCNC: 0.7 MG/DL (ref 0–1.2)
BUN SERPL-MCNC: 18 MG/DL (ref 8–27)
BUN/CREAT SERPL: 15 (ref 10–24)
CALCIUM SERPL-MCNC: 10 MG/DL (ref 8.6–10.2)
CHLORIDE SERPL-SCNC: 100 MMOL/L (ref 96–106)
CO2 SERPL-SCNC: 25 MMOL/L (ref 18–29)
CREAT SERPL-MCNC: 1.17 MG/DL (ref 0.76–1.27)
GFR SERPLBLD CREATININE-BSD FMLA CKD-EPI: 66 ML/MIN/1.73
GFR SERPLBLD CREATININE-BSD FMLA CKD-EPI: 77 ML/MIN/1.73
GLOBULIN SER CALC-MCNC: 2.3 G/DL (ref 1.5–4.5)
GLUCOSE SERPL-MCNC: 87 MG/DL (ref 65–99)
POTASSIUM SERPL-SCNC: 4.2 MMOL/L (ref 3.5–5.2)
PROT SERPL-MCNC: 7 G/DL (ref 6–8.5)
SODIUM SERPL-SCNC: 141 MMOL/L (ref 134–144)

## 2018-05-03 ENCOUNTER — TELEPHONE (OUTPATIENT)
Dept: FAMILY MEDICINE CLINIC | Age: 63
End: 2018-05-03

## 2018-05-03 NOTE — TELEPHONE ENCOUNTER
Patient calling again. He says that he feels \"loopy and out of it\" with no appetite. All he is able to get down is water.

## 2018-05-04 NOTE — TELEPHONE ENCOUNTER
Spoke with pt 5/3/18. He will cut back dose of metfomrin to 1000 in am and 500 at night. Discussed that metformin side effect most likely reason for symptoms. Encouraged him to push fluids.

## 2018-05-23 DIAGNOSIS — I10 ESSENTIAL HYPERTENSION: ICD-10-CM

## 2018-05-23 RX ORDER — AMLODIPINE BESYLATE 10 MG/1
10 TABLET ORAL DAILY
Qty: 90 TAB | Refills: 0 | Status: SHIPPED | OUTPATIENT
Start: 2018-05-23 | End: 2018-10-18 | Stop reason: SDUPTHER

## 2018-05-29 ENCOUNTER — OFFICE VISIT (OUTPATIENT)
Dept: FAMILY MEDICINE CLINIC | Age: 63
End: 2018-05-29

## 2018-05-29 VITALS
OXYGEN SATURATION: 95 % | HEART RATE: 99 BPM | TEMPERATURE: 98.1 F | WEIGHT: 233 LBS | DIASTOLIC BLOOD PRESSURE: 81 MMHG | RESPIRATION RATE: 14 BRPM | SYSTOLIC BLOOD PRESSURE: 123 MMHG | BODY MASS INDEX: 38.77 KG/M2

## 2018-05-29 DIAGNOSIS — Z87.39 HISTORY OF GOUT: ICD-10-CM

## 2018-05-29 DIAGNOSIS — I10 ESSENTIAL HYPERTENSION: ICD-10-CM

## 2018-05-29 DIAGNOSIS — Z12.11 SCREENING FOR COLON CANCER: ICD-10-CM

## 2018-05-29 RX ORDER — HYDROCHLOROTHIAZIDE 12.5 MG/1
12.5 TABLET ORAL DAILY
Qty: 30 TAB | Refills: 0 | Status: SHIPPED | OUTPATIENT
Start: 2018-05-29 | End: 2018-06-21 | Stop reason: SDUPTHER

## 2018-05-29 RX ORDER — LANCETS
EACH MISCELLANEOUS
Qty: 200 EACH | Refills: 3 | Status: SHIPPED | OUTPATIENT
Start: 2018-05-29

## 2018-05-29 RX ORDER — INDOMETHACIN 50 MG/1
CAPSULE ORAL
Qty: 90 CAP | Refills: 0 | Status: SHIPPED | OUTPATIENT
Start: 2018-05-29 | End: 2019-05-20 | Stop reason: SDUPTHER

## 2018-05-29 NOTE — PROGRESS NOTES
Chief Complaint   Patient presents with    Gout     flare up     Patient is here to be seen for gout flare up in right foot.

## 2018-05-29 NOTE — PROGRESS NOTES
Subjective:     Matt Garcia is a 61 y.o. male who presents today with the following:  Chief Complaint   Patient presents with    Gout     flare up     Gout Flare Up  Patient with R great toe swelling intermittently for the last few weeks. Had some acute medication for gout on hand, but cannot recall the name of the pill. Took some of this which helped but now is out of it and his toe has been swelling more and more recently. DM2  Pt here for follow up of diabetes mellitus type 2. He also has hypertension and hyperlipidemia. Diabetic Review of Systems - medication compliance: compliant all of the time, diabetic diet compliance: compliant most of the time, home glucose monitoring: is performed regularly, fasting values range 90s-110s, bedtime is around 100-130. Has been slowly weaning himself off of insulin 2 units at a time while taking metformin 1000 mg BID. Sugars have remained stable. Wt Readings from Last 3 Encounters:   05/29/18 233 lb (105.7 kg)   05/01/18 237 lb (107.5 kg)   04/03/18 241 lb 9.6 oz (109.6 kg)     LE swelling  Patient has also noted some LE swelling in the last few weeks. States he was on HCTZ prior to hospitalization but they stopped it while in the hospital.  Is wondering if he needs to resume. Denies chest pain, shortness of breath.       ROS:  Gen: denies fever, chills, fatigue, weight loss, weight gain  HEENT:denies blurry vision, nasal congestion, sore throat  Resp: denies dypsnea, cough, wheezing  CV: denies chest pain, palpitations, lower extremity edema  Abd: denies nausea, vomiting, diarrhea, constipation  Neuro: denies numbness/tingling  Endo: denies polyuria, polydipsia, heat/cold intolerance  Heme: no lymphadenopathy    No Known Allergies      Current Outpatient Prescriptions:     glucose blood VI test strips (ASCENSIA AUTODISC VI, ONE TOUCH ULTRA TEST VI) strip, Check blood sugar twice a day, Disp: 180 Strip, Rfl: 1    indomethacin (INDOCIN) 50 mg capsule, Take 1 pill TID PO for duration of gout flare, Disp: 90 Cap, Rfl: 0    hydroCHLOROthiazide (HYDRODIURIL) 12.5 mg tablet, Take 1 Tab by mouth daily. , Disp: 30 Tab, Rfl: 0    Lancets misc, Check blood glucose fasting and at bed time, Disp: 200 Each, Rfl: 3    amLODIPine (NORVASC) 10 mg tablet, Take 1 Tab by mouth daily. , Disp: 90 Tab, Rfl: 0    metFORMIN (GLUCOPHAGE) 1,000 mg tablet, Take 1 Tab by mouth two (2) times daily (with meals). , Disp: 180 Tab, Rfl: 0    lisinopril (PRINIVIL, ZESTRIL) 10 mg tablet, Take 1 Tab by mouth daily. , Disp: 90 Tab, Rfl: 1    sertraline (ZOLOFT) 25 mg tablet, Take 1 Tab by mouth daily. , Disp: 90 Tab, Rfl: 1    mometasone (ELOCON) 0.1 % topical cream, Apply  to affected area daily. , Disp: 45 g, Rfl: 1    allopurinol (ZYLOPRIM) 100 mg tablet, Take 2 Tabs by mouth daily. , Disp: 60 Tab, Rfl: 11    aspirin delayed-release 81 mg tablet, Take 1 Tab by mouth daily. , Disp: 30 Tab, Rfl: 0    melatonin 3 mg tablet, Take 1 Tab by mouth nightly as needed. , Disp: 30 Tab, Rfl: 0    Nebulizer Accessories kit, by Nebulization route every six (6) hours as needed for Cough. , Disp: 1 Kit, Rfl: 0    insulin glargine (LANTUS,BASAGLAR) 100 unit/mL (3 mL) inpn, Inject 12 units SQ daily, Disp: 3 mL, Rfl: 2    Insulin Needles, Disposable, (BD ULTRA-FINE MICRO PEN NEEDLE) 32 gauge x 1/4\" ndle, Inject insulin SQ daily, Disp: 90 Pen Needle, Rfl: 1    insulin glargine (LANTUS) 100 unit/mL injection, 12 Units by SubCUTAneous route nightly., Disp: 1 Vial, Rfl: 0    Insulin Needles, Disposable, 31 gauge x 5/16\" ndle, by SubCUTAneous route nightly., Disp: 1 Package, Rfl: 0    Past Medical History:   Diagnosis Date    Hypertension        Past Surgical History:   Procedure Laterality Date    HX LITHOTRIPSY         History   Smoking Status    Former Smoker   Smokeless Tobacco    Never Used       Social History     Social History    Marital status:      Spouse name: N/A    Number of children: N/A    Years of education: N/A     Social History Main Topics    Smoking status: Former Smoker    Smokeless tobacco: Never Used    Alcohol use Yes    Drug use: None    Sexual activity: Not Asked     Other Topics Concern    None     Social History Narrative       History reviewed. No pertinent family history. Objective:     Visit Vitals    /81 (BP 1 Location: Left arm, BP Patient Position: Sitting)    Pulse 99    Temp 98.1 °F (36.7 °C) (Oral)    Resp 14    Ht (P) 5' 5\" (1.651 m)    Wt 233 lb (105.7 kg)    SpO2 95%    BMI (P) 38.77 kg/m2     Gen: alert, oriented, no acute distress  Head: normocephalic, atraumatic  Eyes:sclera clear, conjunctiva clear  Oral: moist mucus membranes, no oral lesions, no pharyngeal exudate, no pharyngeal erythema  Neck: symmetric normal sized thyroid, no carotid bruits, no JVD  Resp: Normal work of breathing, lungs CTAB, no w/r/r  CV: S1, S2 normal.  No murmurs, rubs, or gallops. Extremities: R great toe and 1st MTP tender to palpation; erythematous. No results found for this visit on 05/29/18. Assessment/ Plan:   Diagnoses and all orders for this visit:    1. Uncontrolled type 2 diabetes mellitus without complication, with long-term current use of insulin ([address]  -     Lancets misc; Check blood glucose fasting and at bed time  -doing well controlling BS with diet/exercise. OK to hold insulin as he has been doing.    -follow up after 6/20, will check A1c at that time. 2. History of gout  -     indomethacin (INDOCIN) 50 mg capsule; Take 1 pill TID PO for duration of gout flare    3. Essential hypertension  -     hydroCHLOROthiazide (HYDRODIURIL) 12.5 mg tablet; Take 1 Tab by mouth daily.  -restart meds, follow up in 3-4 weeks for BP check, labs    4. Screening for colon cancer  -     OCCULT BLOOD, IMMUNOASSAY (FIT)    Other orders  -     glucose blood VI test strips (ASCENSIA AUTODISC VI, ONE TOUCH ULTRA TEST VI) strip;  Check blood sugar twice a day Verbal and written instructions (see AVS) provided.  Patient expresses understanding of diagnosis and treatment plan. Gilles Lab.  Alisa Parra MD

## 2018-06-07 LAB — HEMOCCULT STL QL IA: NEGATIVE

## 2018-06-08 NOTE — PROGRESS NOTES
Patient verified his name and . Patient notified of stool kit result. Patient verbalized understanding.

## 2018-06-14 RX ORDER — METFORMIN HYDROCHLORIDE 1000 MG/1
1000 TABLET ORAL 2 TIMES DAILY WITH MEALS
Qty: 180 TAB | Refills: 3 | Status: SHIPPED | OUTPATIENT
Start: 2018-06-14 | End: 2019-07-25 | Stop reason: SDUPTHER

## 2018-06-21 ENCOUNTER — OFFICE VISIT (OUTPATIENT)
Dept: FAMILY MEDICINE CLINIC | Age: 63
End: 2018-06-21

## 2018-06-21 VITALS
DIASTOLIC BLOOD PRESSURE: 86 MMHG | WEIGHT: 228 LBS | HEART RATE: 94 BPM | HEIGHT: 65 IN | RESPIRATION RATE: 12 BRPM | BODY MASS INDEX: 37.99 KG/M2 | TEMPERATURE: 98.1 F | OXYGEN SATURATION: 96 % | SYSTOLIC BLOOD PRESSURE: 135 MMHG

## 2018-06-21 DIAGNOSIS — Z11.4 SCREENING FOR HIV (HUMAN IMMUNODEFICIENCY VIRUS): ICD-10-CM

## 2018-06-21 DIAGNOSIS — I10 ESSENTIAL HYPERTENSION: ICD-10-CM

## 2018-06-21 DIAGNOSIS — F32.A DEPRESSION, UNSPECIFIED DEPRESSION TYPE: ICD-10-CM

## 2018-06-21 DIAGNOSIS — F33.0 MILD EPISODE OF RECURRENT MAJOR DEPRESSIVE DISORDER (HCC): ICD-10-CM

## 2018-06-21 DIAGNOSIS — E11.21 TYPE 2 DIABETES WITH NEPHROPATHY (HCC): Primary | ICD-10-CM

## 2018-06-21 DIAGNOSIS — Z11.59 ENCOUNTER FOR HEPATITIS C SCREENING TEST FOR LOW RISK PATIENT: ICD-10-CM

## 2018-06-21 LAB — HBA1C MFR BLD HPLC: 5.6 %

## 2018-06-21 RX ORDER — HYDROCHLOROTHIAZIDE 12.5 MG/1
12.5 TABLET ORAL DAILY
Qty: 30 TAB | Refills: 5 | Status: SHIPPED | OUTPATIENT
Start: 2018-06-21 | End: 2018-12-12 | Stop reason: SDUPTHER

## 2018-06-21 RX ORDER — SERTRALINE HYDROCHLORIDE 25 MG/1
25 TABLET, FILM COATED ORAL DAILY
Qty: 90 TAB | Refills: 1 | Status: SHIPPED | OUTPATIENT
Start: 2018-06-21 | End: 2018-10-22 | Stop reason: SDUPTHER

## 2018-06-21 NOTE — PROGRESS NOTES
Subjective:     Monty Olivares is a 61 y.o. male who presents today with the following:  Chief Complaint   Patient presents with    Diabetes     DM2  Pt here for follow up of diabetes mellitus type 2. He also has hypertension and hyperlipidemia. Diabetic Review of Systems - medication compliance: compliant all of the time, diabetic diet compliance: compliant all of the time, home glucose monitoring: is performed regularly, fasting values range  [id number], 2 hour post prandial values range 110s, last eye exam approximately 5 months ago. HTN   Monty Olivares is a 61 y.o. male with hypertension. Hypertension ROS: taking medications as instructed, no medication side effects noted, no TIA's, no chest pain on exertion, no dyspnea on exertion, no swelling of ankles. New concerns: none. Needs refill on HCTZ. Wt Readings from Last 3 Encounters:   06/21/18 228 lb (103.4 kg)   05/29/18 233 lb (105.7 kg)   05/01/18 237 lb (107.5 kg)         ROS:  Gen: denies fever, chills, fatigue, weight loss, weight gain  HEENT:denies blurry vision, nasal congestion, sore throat  Resp: denies dypsnea, cough, wheezing  CV: denies chest pain, palpitations, lower extremity edema  Abd: denies nausea, vomiting, diarrhea, constipation  Neuro: denies numbness/tingling  Endo: denies polyuria, polydipsia, heat/cold intolerance  Heme: no lymphadenopathy    No Known Allergies      Current Outpatient Prescriptions:     sertraline (ZOLOFT) 25 mg tablet, Take 1 Tab by mouth daily. , Disp: 90 Tab, Rfl: 1    hydroCHLOROthiazide (HYDRODIURIL) 12.5 mg tablet, Take 1 Tab by mouth daily. , Disp: 30 Tab, Rfl: 5    metFORMIN (GLUCOPHAGE) 1,000 mg tablet, Take 1 Tab by mouth two (2) times daily (with meals). , Disp: 180 Tab, Rfl: 3    glucose blood VI test strips (ASCENSIA AUTODISC VI, ONE TOUCH ULTRA TEST VI) strip, Check blood sugar twice a day, Disp: 180 Strip, Rfl: 1    indomethacin (INDOCIN) 50 mg capsule, Take 1 pill TID PO for duration of gout flare, Disp: 90 Cap, Rfl: 0    Lancets misc, Check blood glucose fasting and at bed time, Disp: 200 Each, Rfl: 3    amLODIPine (NORVASC) 10 mg tablet, Take 1 Tab by mouth daily. , Disp: 90 Tab, Rfl: 0    lisinopril (PRINIVIL, ZESTRIL) 10 mg tablet, Take 1 Tab by mouth daily. , Disp: 90 Tab, Rfl: 1    mometasone (ELOCON) 0.1 % topical cream, Apply  to affected area daily. , Disp: 45 g, Rfl: 1    allopurinol (ZYLOPRIM) 100 mg tablet, Take 2 Tabs by mouth daily. , Disp: 60 Tab, Rfl: 11    aspirin delayed-release 81 mg tablet, Take 1 Tab by mouth daily. , Disp: 30 Tab, Rfl: 0    melatonin 3 mg tablet, Take 1 Tab by mouth nightly as needed. , Disp: 30 Tab, Rfl: 0    insulin glargine (LANTUS,BASAGLAR) 100 unit/mL (3 mL) inpn, Inject 12 units SQ daily, Disp: 3 mL, Rfl: 2    Insulin Needles, Disposable, (BD ULTRA-FINE MICRO PEN NEEDLE) 32 gauge x 1/4\" ndle, Inject insulin SQ daily, Disp: 90 Pen Needle, Rfl: 1    insulin glargine (LANTUS) 100 unit/mL injection, 12 Units by SubCUTAneous route nightly., Disp: 1 Vial, Rfl: 0    Nebulizer Accessories kit, by Nebulization route every six (6) hours as needed for Cough. , Disp: 1 Kit, Rfl: 0    Insulin Needles, Disposable, 31 gauge x 5/16\" ndle, by SubCUTAneous route nightly., Disp: 1 Package, Rfl: 0    Past Medical History:   Diagnosis Date    Hypertension        Past Surgical History:   Procedure Laterality Date    HX LITHOTRIPSY         History   Smoking Status    Former Smoker   Smokeless Tobacco    Never Used       Social History     Social History    Marital status:      Spouse name: N/A    Number of children: N/A    Years of education: N/A     Social History Main Topics    Smoking status: Former Smoker    Smokeless tobacco: Never Used    Alcohol use Yes    Drug use: None    Sexual activity: Not Asked     Other Topics Concern    None     Social History Narrative       No family history on file.       Objective:     Visit Vitals    /86 (BP 1 Location: Left arm, BP Patient Position: Sitting)    Pulse 94    Temp 98.1 °F (36.7 °C) (Oral)    Resp 12    Ht 5' 5\" (1.651 m)    Wt 228 lb (103.4 kg)    SpO2 96%    BMI 37.94 kg/m2     Gen: alert, oriented, no acute distress  Head: normocephalic, atraumatic  Eyes:sclera clear, conjunctiva clear  Oral: moist mucus membranes, no oral lesions, no pharyngeal exudate, no pharyngeal erythema  Neck: symmetric normal sized thyroid, no carotid bruits, no JVD  Resp: Normal work of breathing, lungs CTAB, no w/r/r  CV: S1, S2 normal.  No murmurs, rubs, or gallops. Abd:  Normal bowel sounds. Soft, not tender, not distended. Skin: no rash             Extremities: no edema    Results for orders placed or performed in visit on 06/21/18   AMB POC HEMOGLOBIN A1C   Result Value Ref Range    Hemoglobin A1c (POC) 5.6 %       Assessment/ Plan:   Diagnoses and all orders for this visit:    1. Type 2 diabetes with nephropathy (HCC)  -     AMB POC HEMOGLOBIN A1C  -     CBC WITH AUTOMATED DIFF  -     METABOLIC PANEL, COMPREHENSIVE  -     TSH 3RD GENERATION    2. Mild episode of recurrent major depressive disorder (HCC)  -     sertraline (ZOLOFT) 25 mg tablet; Take 1 Tab by mouth daily.  -     CBC WITH AUTOMATED DIFF  -     METABOLIC PANEL, COMPREHENSIVE  -     TSH 3RD GENERATION    3. Depression, unspecified depression type  -     sertraline (ZOLOFT) 25 mg tablet; Take 1 Tab by mouth daily.  -     CBC WITH AUTOMATED DIFF  -     METABOLIC PANEL, COMPREHENSIVE  -     TSH 3RD GENERATION    4. Essential hypertension  -     hydroCHLOROthiazide (HYDRODIURIL) 12.5 mg tablet; Take 1 Tab by mouth daily.  -     CBC WITH AUTOMATED DIFF  -     METABOLIC PANEL, COMPREHENSIVE  -     TSH 3RD GENERATION    5. Screening for HIV (human immunodeficiency virus)  -     HIV 1/2 AG/AB, 4TH GENERATION,W RFLX CONFIRM    6. Encounter for hepatitis C screening test for low risk patient  -     HEPATITIS C AB     check labs (including screening labs).   Keep up the good work on diabetes care-A1c is out of pre-diabetic range (down from 11!)     Follow up in 3 months. If maintaining, we can space out to every 6 months. Verbal and written instructions (see AVS) provided.  Patient expresses understanding of diagnosis and treatment plan. Candance Passe.  Davey Asher MD

## 2018-06-21 NOTE — LETTER
June 21, 2018 Monty Olivares [facility] [employer] [address] [place] [address] Dear Derrick Holguin: 
Thank you for requesting access to [employer]. Please follow the instructions below to securely access and download your online medical record. [employer] allows you to send messages to your doctor, view your test results, renew your prescriptions, schedule appointments, and more. How Do I Sign Up? 1. In your internet browser, go to [email or web address]. 2. Click on the First Time User? Click Here link in the Sign In box. You will see the New Member Sign Up page. 3. Enter your [employer] Access Code exactly as it appears below. You will not need to use this code after youve completed the sign-up process. If you do not sign up before the expiration date, you must request a new code. [employer] Access Code: [email or web address] Expires: 9/19/2018 10:43 AM  
 
4. Enter the last four digits of your Social Security Number (xxxx) and Date of Birth (mm/dd/yyyy) as indicated and click Submit. You will be taken to the next sign-up page. 5. Create a [employer] ID. This will be your [employer] login ID and cannot be changed, so think of one that is secure and easy to remember. 6. Create a [employer] password. You can change your password at any time. 7. Enter your Password Reset Question and Answer. This can be used at a later time if you forget your password. 8. Enter your e-mail address. You will receive e-mail notification when new information is available in [address] 9. Click Sign Up. You can now view and download portions of your medical record. 10. Click the Download Summary menu link to download a portable copy of your medical information. Additional Information If you have questions, please visit the Frequently Asked Questions section of the [employer] website at [email or web address] Remember, [employer] is NOT to be used for urgent needs. For medical emergencies, dial 911. Now available from your iPhone and Android! Sincerely, [employer]

## 2018-06-22 LAB
ALBUMIN SERPL-MCNC: 4.5 G/DL (ref 3.6–4.8)
ALBUMIN/GLOB SERPL: 2 {RATIO} (ref 1.2–2.2)
ALP SERPL-CCNC: 70 IU/L (ref 39–117)
ALT SERPL-CCNC: 17 IU/L (ref 0–44)
AST SERPL-CCNC: 17 IU/L (ref 0–40)
BASOPHILS # BLD AUTO: 0.1 X10E3/UL (ref 0–0.2)
BASOPHILS NFR BLD AUTO: 1 %
BILIRUB SERPL-MCNC: 0.5 MG/DL (ref 0–1.2)
BUN SERPL-MCNC: 21 MG/DL (ref 8–27)
BUN/CREAT SERPL: 17 (ref 10–24)
CALCIUM SERPL-MCNC: 9.5 MG/DL (ref 8.6–10.2)
CHLORIDE SERPL-SCNC: 101 MMOL/L (ref 96–106)
CO2 SERPL-SCNC: 26 MMOL/L (ref 20–29)
CREAT SERPL-MCNC: 1.24 MG/DL (ref 0.76–1.27)
EOSINOPHIL # BLD AUTO: 0.5 X10E3/UL (ref 0–0.4)
EOSINOPHIL NFR BLD AUTO: 6 %
ERYTHROCYTE [DISTWIDTH] IN BLOOD BY AUTOMATED COUNT: 14.9 % (ref 12.3–15.4)
GFR SERPLBLD CREATININE-BSD FMLA CKD-EPI: 61 ML/MIN/1.73
GFR SERPLBLD CREATININE-BSD FMLA CKD-EPI: 71 ML/MIN/1.73
GLOBULIN SER CALC-MCNC: 2.2 G/DL (ref 1.5–4.5)
GLUCOSE SERPL-MCNC: 87 MG/DL (ref 65–99)
HCT VFR BLD AUTO: 40.3 % (ref 37.5–51)
HCV AB S/CO SERPL IA: <0.1 S/CO RATIO (ref 0–0.9)
HGB BLD-MCNC: 12.9 G/DL (ref 13–17.7)
HIV 1+2 AB+HIV1 P24 AG SERPL QL IA: NON REACTIVE
IMM GRANULOCYTES # BLD: 0.1 X10E3/UL (ref 0–0.1)
IMM GRANULOCYTES NFR BLD: 1 %
LYMPHOCYTES # BLD AUTO: 1.2 X10E3/UL (ref 0.7–3.1)
LYMPHOCYTES NFR BLD AUTO: 15 %
MCH RBC QN AUTO: 28 PG (ref 26.6–33)
MCHC RBC AUTO-ENTMCNC: 32 G/DL (ref 31.5–35.7)
MCV RBC AUTO: 88 FL (ref 79–97)
MONOCYTES # BLD AUTO: 0.8 X10E3/UL (ref 0.1–0.9)
MONOCYTES NFR BLD AUTO: 10 %
NEUTROPHILS # BLD AUTO: 5.5 X10E3/UL (ref 1.4–7)
NEUTROPHILS NFR BLD AUTO: 67 %
PLATELET # BLD AUTO: 231 X10E3/UL (ref 150–379)
POTASSIUM SERPL-SCNC: 4.1 MMOL/L (ref 3.5–5.2)
PROT SERPL-MCNC: 6.7 G/DL (ref 6–8.5)
RBC # BLD AUTO: 4.6 X10E6/UL (ref 4.14–5.8)
SODIUM SERPL-SCNC: 143 MMOL/L (ref 134–144)
TSH SERPL DL<=0.005 MIU/L-ACNC: 2.31 UIU/ML (ref 0.45–4.5)
WBC # BLD AUTO: 8.1 X10E3/UL (ref 3.4–10.8)

## 2018-06-23 NOTE — PROGRESS NOTES
HI Mr. Sheila Rizo, can you see the HIV (Aids) lab now? It is listed as \"HIV 1/2, AG/AB, 4th generaltion w RFLX confirm. If you cannot see it, it may be a privacy issue-some labs have to be mailed. I can mail you a copy of the result if you cannot see it. Thanks.

## 2018-07-25 ENCOUNTER — OFFICE VISIT (OUTPATIENT)
Dept: FAMILY MEDICINE CLINIC | Age: 63
End: 2018-07-25

## 2018-07-25 VITALS
RESPIRATION RATE: 12 BRPM | SYSTOLIC BLOOD PRESSURE: 136 MMHG | HEART RATE: 89 BPM | WEIGHT: 227 LBS | DIASTOLIC BLOOD PRESSURE: 94 MMHG | OXYGEN SATURATION: 95 % | TEMPERATURE: 98 F | HEIGHT: 65 IN | BODY MASS INDEX: 37.82 KG/M2

## 2018-07-25 DIAGNOSIS — E11.21 TYPE 2 DIABETES WITH NEPHROPATHY (HCC): ICD-10-CM

## 2018-07-25 DIAGNOSIS — M79.89 FOOT SWELLING: Primary | ICD-10-CM

## 2018-07-25 NOTE — PROGRESS NOTES
Chief Complaint   Patient presents with    Ankle swelling     Patient is here to have left foot and ankle evaluated for swelling and discoloration.

## 2018-07-25 NOTE — PROGRESS NOTES
Subjective:     Laura Yañez is a 61 y.o. male who presents today with the following:  Chief Complaint   Patient presents with    Ankle swelling     Patient with PMH gout presents today with L ankle swelling and discoloration for the last 2 weeks. States he did not have any injury or accident in the last 2 weeks. Thought it might be gout so stopped allopurinol and started indomethacin, but has been taking only 1 pill a day. L ankle and foot are swelling into mid portion of foot. Not painful. Patient was worried it might be a blood clot. Blood sugars have been well controlled. ROS:  Gen: denies fever, chills, fatigue,   HEENT:denies blurry vision, nasal congestion, sore throat  Resp: denies dypsnea, cough, wheezing  CV: denies chest pain, palpitations, lower extremity edema  Abd: denies nausea, vomiting, diarrhea, constipation  Neuro: denies numbness/tingling  Endo: denies polyuria, polydipsia, heat/cold intolerance      No Known Allergies      Current Outpatient Prescriptions:     flash glucose scanning reader (FREESTYLE MARC READER) misc, Check blood sugar fasting daily, Disp: 1 Kit, Rfl: 0    flash glucose sensor (FREESTYLE MARC SENSOR) kit, Check fasting blood sugars daily, Disp: 1 Kit, Rfl: 0    sertraline (ZOLOFT) 25 mg tablet, Take 1 Tab by mouth daily. , Disp: 90 Tab, Rfl: 1    hydroCHLOROthiazide (HYDRODIURIL) 12.5 mg tablet, Take 1 Tab by mouth daily. , Disp: 30 Tab, Rfl: 5    metFORMIN (GLUCOPHAGE) 1,000 mg tablet, Take 1 Tab by mouth two (2) times daily (with meals). , Disp: 180 Tab, Rfl: 3    glucose blood VI test strips (ASCENSIA AUTODISC VI, ONE TOUCH ULTRA TEST VI) strip, Check blood sugar twice a day, Disp: 180 Strip, Rfl: 1    indomethacin (INDOCIN) 50 mg capsule, Take 1 pill TID PO for duration of gout flare, Disp: 90 Cap, Rfl: 0    Lancets misc, Check blood glucose fasting and at bed time, Disp: 200 Each, Rfl: 3    amLODIPine (NORVASC) 10 mg tablet, Take 1 Tab by mouth daily. , Disp: 90 Tab, Rfl: 0    lisinopril (PRINIVIL, ZESTRIL) 10 mg tablet, Take 1 Tab by mouth daily. , Disp: 90 Tab, Rfl: 1    mometasone (ELOCON) 0.1 % topical cream, Apply  to affected area daily. , Disp: 45 g, Rfl: 1    allopurinol (ZYLOPRIM) 100 mg tablet, Take 2 Tabs by mouth daily. , Disp: 60 Tab, Rfl: 11    aspirin delayed-release 81 mg tablet, Take 1 Tab by mouth daily. , Disp: 30 Tab, Rfl: 0    melatonin 3 mg tablet, Take 1 Tab by mouth nightly as needed. , Disp: 30 Tab, Rfl: 0    insulin glargine (LANTUS,BASAGLAR) 100 unit/mL (3 mL) inpn, Inject 12 units SQ daily, Disp: 3 mL, Rfl: 2    Insulin Needles, Disposable, (BD ULTRA-FINE MICRO PEN NEEDLE) 32 gauge x 1/4\" ndle, Inject insulin SQ daily, Disp: 90 Pen Needle, Rfl: 1    insulin glargine (LANTUS) 100 unit/mL injection, 12 Units by SubCUTAneous route nightly., Disp: 1 Vial, Rfl: 0    Nebulizer Accessories kit, by Nebulization route every six (6) hours as needed for Cough. , Disp: 1 Kit, Rfl: 0    Insulin Needles, Disposable, 31 gauge x 5/16\" ndle, by SubCUTAneous route nightly., Disp: 1 Package, Rfl: 0    Past Medical History:   Diagnosis Date    Hypertension        Past Surgical History:   Procedure Laterality Date    HX LITHOTRIPSY         History   Smoking Status    Former Smoker   Smokeless Tobacco    Never Used       Social History     Social History    Marital status:      Spouse name: N/A    Number of children: N/A    Years of education: N/A     Social History Main Topics    Smoking status: Former Smoker    Smokeless tobacco: Never Used    Alcohol use Yes    Drug use: None    Sexual activity: Not Asked     Other Topics Concern    None     Social History Narrative       History reviewed. No pertinent family history.       Objective:     Visit Vitals    BP (!) 143/94 (BP 1 Location: Left arm, BP Patient Position: Sitting)    Pulse 89    Temp 98 °F (36.7 °C) (Oral)    Resp 12    Ht 5' 5\" (1.651 m)    Wt 227 lb (103 kg)    SpO2 95%    BMI 37.77 kg/m2     Gen: alert, oriented, no acute distress  Head: normocephalic, atraumatic  Eyes:sclera clear, conjunctiva clear  Oral: moist mucus membranes, no oral lesions, no pharyngeal exudate, no pharyngeal erythema  Neck: symmetric normal sized thyroid, no carotid bruits, no JVD  Resp: Normal work of breathing, lungs CTAB, no w/r/r  CV: S1, S2 normal.  No murmurs, rubs, or gallops. Abd:  Normal bowel sounds. Soft, not tender, not distended. Skin: no rash             Extremities: L ankle with minimal edema on dorsal aspect of foot up to mid foot. L foot and ankle with normal ROM and strength. Foot nontender to palpation. LLE negative homans, calf size equal to RLE. Assessment/ Plan:   Diagnoses and all orders for this visit:    1. Foot swelling  -     XR FOOT LT MIN 3 V; Future  -     XR ANKLE LT MIN 3 V; Future    Provided reassurance to patient-there is only minimal swelling, and it seems to conform to where his sandals hit his foot. I do not think this is a blood clot. I also do not think this is gout, given lack of pain and general appearance of foot. As a precauation, will get XR and monitor. Follow up 1 week if no improvement in symptoms. 2. Type 2 diabetes with nephropathy (HCC)  -     AMB SUPPLY ORDER  -     flash glucose scanning reader (FREESTYLE MARC READER) misc; Check blood sugar fasting daily  -     flash glucose sensor (FREESTYLE MARC SENSOR) kit; Check fasting blood sugars daily      Verbal and written instructions (see AVS) provided.  Patient expresses understanding of diagnosis and treatment plan. Wolfgang Mejia.  Eula Mccormick MD

## 2018-07-26 ENCOUNTER — HOSPITAL ENCOUNTER (OUTPATIENT)
Dept: GENERAL RADIOLOGY | Age: 63
Discharge: HOME | End: 2018-07-26
Payer: COMMERCIAL

## 2018-07-26 DIAGNOSIS — M79.89 FOOT SWELLING: ICD-10-CM

## 2018-07-26 PROCEDURE — 73610 X-RAY EXAM OF ANKLE: CPT

## 2018-07-26 PROCEDURE — 73630 X-RAY EXAM OF FOOT: CPT

## 2018-07-26 NOTE — PROGRESS NOTES
Mr. Martínez Sharlene, your xr shows only mild soft tissue swelling in your foot. Please keep your foot elevated as we discussed and try to avoid wearing sandals. You may also try wrapping your foot in an ace bandange. Please follow up if swelling does not improve.

## 2018-08-08 ENCOUNTER — TELEPHONE (OUTPATIENT)
Dept: FAMILY MEDICINE CLINIC | Age: 63
End: 2018-08-08

## 2018-08-08 NOTE — TELEPHONE ENCOUNTER
I have heard of it, I am fairly certain insurance will not cover it. It is ok to use, however, as long as you don't have any open sores or cuts on your legs.

## 2018-08-08 NOTE — TELEPHONE ENCOUNTER
\"Have you ever heard of this equipment. It's supposed to help with feet  problems and circulation, as well as diabetics. I saw it advertised on TV.    Was wondering if it could be scripted and covered under insurance. Please advise!      Revitive the medic [id number].  82.45 3 installments \"

## 2018-08-08 NOTE — TELEPHONE ENCOUNTER
Notified  Alayna Rich Dr. Karyle Barba said \"I have heard of it, I am fairly certain insurance will not cover it. It is ok to use, however, as long as you don't have any open sores or cuts on your legs. \"    He voiced understanding.

## 2018-09-21 ENCOUNTER — OFFICE VISIT (OUTPATIENT)
Dept: FAMILY MEDICINE CLINIC | Age: 63
End: 2018-09-21

## 2018-09-21 VITALS
HEIGHT: 65 IN | HEART RATE: 83 BPM | TEMPERATURE: 98.1 F | OXYGEN SATURATION: 98 % | RESPIRATION RATE: 12 BRPM | DIASTOLIC BLOOD PRESSURE: 80 MMHG | WEIGHT: 222 LBS | SYSTOLIC BLOOD PRESSURE: 119 MMHG | BODY MASS INDEX: 36.99 KG/M2

## 2018-09-21 DIAGNOSIS — E11.9 CONTROLLED TYPE 2 DIABETES MELLITUS WITHOUT COMPLICATION, WITHOUT LONG-TERM CURRENT USE OF INSULIN (HCC): ICD-10-CM

## 2018-09-21 DIAGNOSIS — M79.672 LEFT FOOT PAIN: ICD-10-CM

## 2018-09-21 DIAGNOSIS — Z23 ENCOUNTER FOR IMMUNIZATION: Primary | ICD-10-CM

## 2018-09-21 DIAGNOSIS — R61 NIGHT SWEATS: ICD-10-CM

## 2018-09-21 NOTE — PROGRESS NOTES
Chief Complaint   Patient presents with    Diabetes     Patient is here for a 3 month follow up. 1. Have you been to the ER, urgent care clinic since your last visit? Hospitalized since your last visit? No    2. Have you seen or consulted any other health care providers outside of the [address] [place] [address] since your last visit? Include any pap smears or colon screening. Barbara      Maxine Salter is a 61 y.o. male who presents for routine Influenza immunization. He denies any symptoms , reactions or allergies that would exclude them from being immunized today. Risks and adverse reactions were discussed and the VIS was given to them. All questions were addressed. He was observed for 10 min post injection. There were no reactions observed.     Kayla Georges

## 2018-09-21 NOTE — PROGRESS NOTES
Subjective:     Divya Nicholson is a 61 y.o. male who presents today with the following:  Chief Complaint   Patient presents with    Diabetes     DM2  Pt here for follow up of diabetes mellitus type 2. He also has hypertension and hyperlipidemia. Diabetic Review of Systems - medication compliance: compliant all of the time, diabetic diet compliance: compliant all of the time, home glucose monitoring: is performed regularly. Wt Readings from Last 3 Encounters:   09/21/18 222 lb (100.7 kg)   07/25/18 227 lb (103 kg)   06/21/18 228 lb (103.4 kg)     Left foot pain  Patient with intermittent left forefoot pain that has been ongoing for several years. States it hurts the most after he is up and walking on it for long periods of time. Also has some swelling at times. Patient is requesting a handicap decal for this reason-he feels it keeps him from doing his regular activities when his foot starts hurting as soon as he walks in from parking. ROS:  Gen: denies fever, chills, fatigue, weight loss, weight gain  HEENT:denies blurry vision, nasal congestion, sore throat  Resp: denies dypsnea, cough, wheezing  CV: denies chest pain, palpitations, lower extremity edema  Abd: denies nausea, vomiting, diarrhea, constipation  Neuro: denies numbness/tingling      Night Sweats  Patient states he has been having intermittent night sweats for several months now. About 1-2x week he will wake up with his whole shirt drenched. Unsure of where this is coming from. Denies unintentional weight loss (has changed diet and is trying to lose weight). No Known Allergies      Current Outpatient Prescriptions:     flash glucose scanning reader (FREESTYLE MARC READER) misc, Check blood sugar fasting daily, Disp: 1 Kit, Rfl: 0    flash glucose sensor (FREESTYLE MARC SENSOR) kit, Check fasting blood sugars daily, Disp: 1 Kit, Rfl: 0    sertraline (ZOLOFT) 25 mg tablet, Take 1 Tab by mouth daily. , Disp: 90 Tab, Rfl: 1   hydroCHLOROthiazide (HYDRODIURIL) 12.5 mg tablet, Take 1 Tab by mouth daily. , Disp: 30 Tab, Rfl: 5    metFORMIN (GLUCOPHAGE) 1,000 mg tablet, Take 1 Tab by mouth two (2) times daily (with meals). , Disp: 180 Tab, Rfl: 3    glucose blood VI test strips (ASCENSIA AUTODISC VI, ONE TOUCH ULTRA TEST VI) strip, Check blood sugar twice a day, Disp: 180 Strip, Rfl: 1    indomethacin (INDOCIN) 50 mg capsule, Take 1 pill TID PO for duration of gout flare, Disp: 90 Cap, Rfl: 0    Lancets misc, Check blood glucose fasting and at bed time, Disp: 200 Each, Rfl: 3    amLODIPine (NORVASC) 10 mg tablet, Take 1 Tab by mouth daily. , Disp: 90 Tab, Rfl: 0    lisinopril (PRINIVIL, ZESTRIL) 10 mg tablet, Take 1 Tab by mouth daily. , Disp: 90 Tab, Rfl: 1    mometasone (ELOCON) 0.1 % topical cream, Apply  to affected area daily. , Disp: 45 g, Rfl: 1    allopurinol (ZYLOPRIM) 100 mg tablet, Take 2 Tabs by mouth daily. , Disp: 60 Tab, Rfl: 11    aspirin delayed-release 81 mg tablet, Take 1 Tab by mouth daily. , Disp: 30 Tab, Rfl: 0    melatonin 3 mg tablet, Take 1 Tab by mouth nightly as needed. , Disp: 30 Tab, Rfl: 0    Insulin Needles, Disposable, (BD ULTRA-FINE MICRO PEN NEEDLE) 32 gauge x 1/4\" ndle, Inject insulin SQ daily, Disp: 90 Pen Needle, Rfl: 1    Nebulizer Accessories kit, by Nebulization route every six (6) hours as needed for Cough. , Disp: 1 Kit, Rfl: 0    insulin glargine (LANTUS,BASAGLAR) 100 unit/mL (3 mL) inpn, Inject 12 units SQ daily, Disp: 3 mL, Rfl: 2    insulin glargine (LANTUS) 100 unit/mL injection, 12 Units by SubCUTAneous route nightly., Disp: 1 Vial, Rfl: 0    Insulin Needles, Disposable, 31 gauge x 5/16\" ndle, by SubCUTAneous route nightly., Disp: 1 Package, Rfl: 0    Past Medical History:   Diagnosis Date    Hypertension        Past Surgical History:   Procedure Laterality Date    HX LITHOTRIPSY         History   Smoking Status    Former Smoker   Smokeless Tobacco    Never Used       Social History Social History    Marital status:      Spouse name: N/A    Number of children: N/A    Years of education: N/A     Social History Main Topics    Smoking status: Former Smoker    Smokeless tobacco: Never Used    Alcohol use Yes    Drug use: Not on file    Sexual activity: Not on file     Other Topics Concern    Not on file     Social History Narrative       No family history on file. Objective:     Visit Vitals    /80 (BP 1 Location: Left arm, BP Patient Position: Sitting)    Pulse 83    Temp 98.1 °F (36.7 °C) (Oral)    Resp 12    Ht 5' 5\" (1.651 m)    Wt 222 lb (100.7 kg)    SpO2 98%    BMI 36.94 kg/m2     Gen: alert, oriented, no acute distress  Head: normocephalic, atraumatic  Eyes:sclera clear, conjunctiva clear  Oral: moist mucus membranes, no oral lesions, no pharyngeal exudate, no pharyngeal erythema  Neck: symmetric normal sized thyroid, no carotid bruits, no JVD  Resp: Normal work of breathing, lungs CTAB, no w/r/r  CV: S1, S2 normal.  No murmurs, rubs, or gallops. Abd:  Normal bowel sounds. Soft, not tender, not distended. Extremities: L foot normal in appearance. Slight swelling on distal aspect of forefoot on dorsal side. Minimally tender to palpation. FOOT EXAM: feet: warm, good capillary refill, nail exam normal nails without lesions, no trophic changes or ulcerative lesions, normal DP and PT pulses, normal monofilament exam      Assessment/ Plan:   Diagnoses and all orders for this visit:    1. Encounter for immunization  -     Influenza virus vaccine (QUADRIVALENT PRES FREE SYRINGE) IM (17072)  -     PA IMMUNIZ ADMIN,1 SINGLE/COMB VAC/TOXOID    2. Controlled type 2 diabetes mellitus without complication, without long-term current use of insulin (HCC)  -blood sugars look great  -foot exam normal today    3. Night sweats  -check CXR    4.  Left foot pain  -will fill out form for DMV  -advised tylenol, ice prn       Verbal and written instructions (see AVS) provided.  Patient expresses understanding of diagnosis and treatment plan. Price Mccurdy.  Onelia Lynch MD

## 2018-09-21 NOTE — PATIENT INSTRUCTIONS
Vaccine Information Statement    Influenza (Flu) Vaccine (Inactivated or Recombinant): What you need to know    Many Vaccine Information Statements are available in [place] and other languages. See www.immunize.org/vis  Hojas de Información Sobre Vacunas están disponibles en Español y en muchos otros idiomas. Visite www.immunize.org/vis    1. Why get vaccinated? Influenza (flu) is a contagious disease that spreads around the [place] every year, usually between October and May. Flu is caused by influenza viruses, and is spread mainly by coughing, sneezing, and close contact. Anyone can get flu. Flu strikes suddenly and can last several days. Symptoms vary by age, but can include:   fever/chills   sore throat   muscle aches   fatigue   cough   headache    runny or stuffy nose    Flu can also lead to pneumonia and blood infections, and cause diarrhea and seizures in children. If you have a medical condition, such as heart or lung disease, flu can make it worse. Flu is more dangerous for some people. Infants and young children, people 72years of age and older, pregnant women, and people with certain health conditions or a weakened immune system are at greatest risk. Each year thousands of people in the [employer] die from flu, and many more are hospitalized. Flu vaccine can:   keep you from getting flu,   make flu less severe if you do get it, and   keep you from spreading flu to your family and other people. 2. Inactivated and recombinant flu vaccines    A dose of flu vaccine is recommended every flu season. Children 6 months through 6years of age may need two doses during the same flu season. Everyone else needs only one dose each flu season.        Some inactivated flu vaccines contain a very small amount of a mercury-based preservative called thimerosal. Studies have not shown thimerosal in vaccines to be harmful, but flu vaccines that do not contain thimerosal are available. There is no live flu virus in flu shots. They cannot cause the flu. There are many flu viruses, and they are always changing. Each year a new flu vaccine is made to protect against three or four viruses that are likely to cause disease in the upcoming flu season. But even when the vaccine doesnt exactly match these viruses, it may still provide some protection    Flu vaccine cannot prevent:   flu that is caused by a virus not covered by the vaccine, or   illnesses that look like flu but are not. It takes about 2 weeks for protection to develop after vaccination, and protection lasts through the flu season. 3. Some people should not get this vaccine    Tell the person who is giving you the vaccine:     If you have any severe, life-threatening allergies. If you ever had a life-threatening allergic reaction after a dose of flu vaccine, or have a severe allergy to any part of this vaccine, you may be advised not to get vaccinated. Most, but not all, types of flu vaccine contain a small amount of egg protein.  If you ever had Guillain-Barré Syndrome (also called GBS). Some people with a history of GBS should not get this vaccine. This should be discussed with your doctor.  If you are not feeling well. It is usually okay to get flu vaccine when you have a mild illness, but you might be asked to come back when you feel better. 4. Risks of a vaccine reaction    With any medicine, including vaccines, there is a chance of reactions. These are usually mild and go away on their own, but serious reactions are also possible. Most people who get a flu shot do not have any problems with it.      Minor problems following a flu shot include:    soreness, redness, or swelling where the shot was given     hoarseness   sore, red or itchy eyes   cough   fever   aches   headache   itching   fatigue  If these problems occur, they usually begin soon after the shot and last 1 or 2 days. More serious problems following a flu shot can include the following:     There may be a small increased risk of Guillain-Barré Syndrome (GBS) after inactivated flu vaccine. This risk has been estimated at 1 or 2 additional cases per million people vaccinated. This is much lower than the risk of severe complications from flu, which can be prevented by flu vaccine.  Young children who get the flu shot along with pneumococcal vaccine (PCV13) and/or DTaP vaccine at the same time might be slightly more likely to have a seizure caused by fever. Ask your doctor for more information. Tell your doctor if a child who is getting flu vaccine has ever had a seizure. Problems that could happen after any injected vaccine:      People sometimes faint after a medical procedure, including vaccination. Sitting or lying down for about 15 minutes can help prevent fainting, and injuries caused by a fall. Tell your doctor if you feel dizzy, or have vision changes or ringing in the ears.  Some people get severe pain in the shoulder and have difficulty moving the arm where a shot was given. This happens very rarely.  Any medication can cause a severe allergic reaction. Such reactions from a vaccine are very rare, estimated at about 1 in a million doses, and would happen within a few minutes to a few hours after the vaccination. As with any medicine, there is a very remote chance of a vaccine causing a serious injury or death. The safety of vaccines is always being monitored. For more information, visit: www.cdc.gov/vaccinesafety/    5. What if there is a serious reaction? What should I look for?  Look for anything that concerns you, such as signs of a severe allergic reaction, very high fever, or unusual behavior.     Signs of a severe allergic reaction can include hives, swelling of the face and throat, difficulty breathing, a fast heartbeat, dizziness, and weakness - usually within a few minutes to a few hours after the vaccination. What should I do?  If you think it is a severe allergic reaction or other emergency that cant wait, call 9-1-1 and get the person to the nearest hospital. Otherwise, call your doctor.  Reactions should be reported to the Vaccine Adverse Event Reporting System (VAERS). Your doctor should file this report, or you can do it yourself through  the VAERS web site at www.vaers. [facility].gov, or by calling 7-429.135.5302. VAERS does not give medical advice. 6. The National Vaccine Injury Compensation Program    The [facility] Vaccine Injury Compensation Program (VICP) is a federal program that was created to compensate people who may have been injured by certain vaccines. Persons who believe they may have been injured by a vaccine can learn about the program and about filing a claim by calling 3-250.272.7870 or visiting the [employer] website at www.[facility].gov/vaccinecompensation. There is a time limit to file a claim for compensation. 7. How can I learn more?  Ask your healthcare provider. He or she can give you the vaccine package insert or suggest other sources of information.  Call your local or state health department.  Contact the Centers for Disease Control and Prevention (CDC):  - Call 6-532.827.4872 (1-800-CDC-INFO) or  - Visit CDCs website at www.cdc.gov/flu    Vaccine Information Statement   Inactivated Influenza Vaccine   8/7/2015  [address] 697OV-01    Department of Health and Human Services  Centers for Disease Control and Prevention    Office Use Only

## 2018-09-25 ENCOUNTER — HOSPITAL ENCOUNTER (OUTPATIENT)
Dept: GENERAL RADIOLOGY | Age: 63
Discharge: HOME | End: 2018-09-25
Payer: COMMERCIAL

## 2018-09-25 DIAGNOSIS — R61 NIGHT SWEATS: ICD-10-CM

## 2018-09-25 PROCEDURE — 71046 X-RAY EXAM CHEST 2 VIEWS: CPT

## 2018-09-26 ENCOUNTER — TELEPHONE (OUTPATIENT)
Dept: FAMILY MEDICINE CLINIC | Age: 63
End: 2018-09-26

## 2018-09-26 NOTE — TELEPHONE ENCOUNTER
Larry Mauricio [facility] notified Dr. Lima Cardenas said Chest xr was normal. He voiced understanding

## 2018-10-01 ENCOUNTER — TELEPHONE (OUTPATIENT)
Dept: FAMILY MEDICINE CLINIC | Age: 63
End: 2018-10-01

## 2018-10-08 ENCOUNTER — TELEPHONE (OUTPATIENT)
Dept: FAMILY MEDICINE CLINIC | Age: 63
End: 2018-10-08

## 2018-10-08 NOTE — TELEPHONE ENCOUNTER
Pt is calling in ref to his DMV Handi Cap Form states he dropped off about 2 weeks ago was told Dr Leon Estrada would be in tomorrow

## 2018-10-18 DIAGNOSIS — I10 ESSENTIAL HYPERTENSION: ICD-10-CM

## 2018-10-18 RX ORDER — AMLODIPINE BESYLATE 10 MG/1
10 TABLET ORAL DAILY
Qty: 90 TAB | Refills: 0 | Status: SHIPPED | OUTPATIENT
Start: 2018-10-18 | End: 2019-01-28 | Stop reason: SDUPTHER

## 2018-10-22 DIAGNOSIS — F32.A DEPRESSION, UNSPECIFIED DEPRESSION TYPE: ICD-10-CM

## 2018-10-22 DIAGNOSIS — F33.0 MILD EPISODE OF RECURRENT MAJOR DEPRESSIVE DISORDER (HCC): ICD-10-CM

## 2018-10-24 RX ORDER — SERTRALINE HYDROCHLORIDE 25 MG/1
25 TABLET, FILM COATED ORAL DAILY
Qty: 90 TAB | Refills: 1 | Status: SHIPPED | OUTPATIENT
Start: 2018-10-24 | End: 2019-05-01 | Stop reason: SDUPTHER

## 2018-11-06 DIAGNOSIS — I10 ESSENTIAL HYPERTENSION: ICD-10-CM

## 2018-11-06 RX ORDER — LISINOPRIL 10 MG/1
10 TABLET ORAL DAILY
Qty: 90 TAB | Refills: 3 | Status: SHIPPED | OUTPATIENT
Start: 2018-11-06 | End: 2019-11-21 | Stop reason: SDUPTHER

## 2018-11-06 NOTE — TELEPHONE ENCOUNTER
Pharmacy is requesting a refill on med    Requested Prescriptions     Pending Prescriptions Disp Refills    lisinopril (PRINIVIL, ZESTRIL) 10 mg tablet 90 Tab 1     Sig: Take 1 Tab by mouth daily.

## 2018-11-06 NOTE — TELEPHONE ENCOUNTER
Requested Prescriptions     Pending Prescriptions Disp Refills    lisinopril (PRINIVIL, ZESTRIL) 10 mg tablet 90 Tab 3     Sig: Take 1 Tab by mouth daily.      Future Appointments:  12.27.2018    Last Appointment With Me:  9/21/2018   Last Filled:  05.01.2018  Changes Made to Medication on Last Visit:  None

## 2018-11-14 DIAGNOSIS — E11.9 NEWLY DIAGNOSED DIABETES (HCC): Primary | ICD-10-CM

## 2018-12-12 DIAGNOSIS — I10 ESSENTIAL HYPERTENSION: ICD-10-CM

## 2018-12-12 RX ORDER — HYDROCHLOROTHIAZIDE 12.5 MG/1
12.5 TABLET ORAL DAILY
Qty: 30 TAB | Refills: 5 | Status: SHIPPED | OUTPATIENT
Start: 2018-12-12 | End: 2019-07-30 | Stop reason: SDUPTHER

## 2018-12-12 NOTE — TELEPHONE ENCOUNTER
Pharmacy is requesting a refill on med    Requested Prescriptions     Pending Prescriptions Disp Refills    hydroCHLOROthiazide (HYDRODIURIL) 12.5 mg tablet 30 Tab 5     Sig: Take 1 Tab by mouth daily.

## 2018-12-27 ENCOUNTER — OFFICE VISIT (OUTPATIENT)
Dept: FAMILY MEDICINE CLINIC | Age: 63
End: 2018-12-27

## 2018-12-27 VITALS
OXYGEN SATURATION: 95 % | BODY MASS INDEX: 37.65 KG/M2 | SYSTOLIC BLOOD PRESSURE: 118 MMHG | DIASTOLIC BLOOD PRESSURE: 80 MMHG | WEIGHT: 226 LBS | RESPIRATION RATE: 18 BRPM | TEMPERATURE: 98.1 F | HEART RATE: 94 BPM | HEIGHT: 65 IN

## 2018-12-27 DIAGNOSIS — I10 ESSENTIAL HYPERTENSION: ICD-10-CM

## 2018-12-27 DIAGNOSIS — R68.89 EXERCISE INTOLERANCE: ICD-10-CM

## 2018-12-27 DIAGNOSIS — E11.21 TYPE 2 DIABETES WITH NEPHROPATHY (HCC): Primary | ICD-10-CM

## 2018-12-27 LAB — HBA1C MFR BLD HPLC: 5.8 % (ref 4.8–5.6)

## 2018-12-27 NOTE — PROGRESS NOTES
Chief Complaint   Patient presents with    Hypertension     3 month follow-up     Health Maintenance reviewed     1. Have you been to the ER, urgent care clinic since your last visit? Hospitalized since your last visit? No     2. Have you seen or consulted any other health care providers outside of the [address] [place] [address] since your last visit? Include any pap smears or colon screening.  No

## 2018-12-27 NOTE — PROGRESS NOTES
Subjective:     Bee Doss is a 61 y.o. male who presents today with the following:  Chief Complaint   Patient presents with    Hypertension     3 month follow-up     HTN  Bee Dose is a 61 y.o. male with hypertension. Hypertension ROS: taking medications as instructed, no medication side effects noted, no TIA's, no chest pain on exertion, no dyspnea on exertion, no swelling of ankles. New concerns:  none    DM2  Pt here for follow up of diabetes mellitus type 2. He also has hypertension and hyperlipidemia. Diabetic Review of Systems - medication compliance: compliant all of the time, diabetic diet compliance: compliant all of the time, home glucose monitoring: is performed regularly. Notes some exercise intolerance and low back pain with soreness. Requesting handicap sticker to help with daily activities and keep him able to participate in what he does. ROS:  Gen: denies fever, chills, fatigue, weight loss, weight gain  HEENT:denies blurry vision, nasal congestion, sore throat  Resp: denies dypsnea, cough, wheezing  CV: denies chest pain, palpitations, lower extremity edema  Abd: denies nausea, vomiting, diarrhea, constipation  Neuro: denies numbness/tingling    No Known Allergies      Current Outpatient Medications:     hydroCHLOROthiazide (HYDRODIURIL) 12.5 mg tablet, Take 1 Tab by mouth daily. , Disp: 30 Tab, Rfl: 5    glucose blood VI test strips (ASCENSIA AUTODISC VI, ONE TOUCH ULTRA TEST VI) strip, Check blood sugar twice a day, Disp: 180 Strip, Rfl: 1    lisinopril (PRINIVIL, ZESTRIL) 10 mg tablet, Take 1 Tab by mouth daily. , Disp: 90 Tab, Rfl: 3    sertraline (ZOLOFT) 25 mg tablet, Take 1 Tab by mouth daily. , Disp: 90 Tab, Rfl: 1    amLODIPine (NORVASC) 10 mg tablet, Take 1 Tab by mouth daily. , Disp: 90 Tab, Rfl: 0    flash glucose scanning reader (FREESTYLE MARC READER) misc, Check blood sugar fasting daily, Disp: 1 Kit, Rfl: 0    flash glucose sensor (FREESTYLE MARC SENSOR) kit, Check fasting blood sugars daily, Disp: 1 Kit, Rfl: 0    metFORMIN (GLUCOPHAGE) 1,000 mg tablet, Take 1 Tab by mouth two (2) times daily (with meals). , Disp: 180 Tab, Rfl: 3    indomethacin (INDOCIN) 50 mg capsule, Take 1 pill TID PO for duration of gout flare, Disp: 90 Cap, Rfl: 0    Lancets misc, Check blood glucose fasting and at bed time, Disp: 200 Each, Rfl: 3    allopurinol (ZYLOPRIM) 100 mg tablet, Take 2 Tabs by mouth daily. , Disp: 60 Tab, Rfl: 11    aspirin delayed-release 81 mg tablet, Take 1 Tab by mouth daily. , Disp: 30 Tab, Rfl: 0    melatonin 3 mg tablet, Take 1 Tab by mouth nightly as needed. , Disp: 30 Tab, Rfl: 0    insulin glargine (LANTUS,BASAGLAR) 100 unit/mL (3 mL) inpn, Inject 12 units SQ daily, Disp: 3 mL, Rfl: 2    Insulin Needles, Disposable, (BD ULTRA-FINE MICRO PEN NEEDLE) 32 gauge x 1/4\" ndle, Inject insulin SQ daily, Disp: 90 Pen Needle, Rfl: 1    insulin glargine (LANTUS) 100 unit/mL injection, 12 Units by SubCUTAneous route nightly., Disp: 1 Vial, Rfl: 0    Nebulizer Accessories kit, by Nebulization route every six (6) hours as needed for Cough. , Disp: 1 Kit, Rfl: 0    Insulin Needles, Disposable, 31 gauge x 5/16\" ndle, by SubCUTAneous route nightly., Disp: 1 Package, Rfl: 0    Past Medical History:   Diagnosis Date    Hypertension        Past Surgical History:   Procedure Laterality Date    HX LITHOTRIPSY         Social History     Tobacco Use   Smoking Status Former Smoker   Smokeless Tobacco Never Used       Social History     Socioeconomic History    Marital status:      Spouse name: Not on file    Number of children: Not on file    Years of education: Not on file    Highest education level: Not on file   Tobacco Use    Smoking status: Former Smoker    Smokeless tobacco: Never Used   Substance and Sexual Activity    Alcohol use: Yes       No family history on file.       Objective:     Visit Vitals  /80 (BP 1 Location: Left arm, BP Patient Position: Sitting)   Pulse 94   Temp 98.1 °F (36.7 °C) (Oral)   Resp 18   Ht 5' 5\" (1.651 m)   Wt 226 lb (102.5 kg)   SpO2 95%   BMI 37.61 kg/m²       Wt Readings from Last 3 Encounters:   12/27/18 226 lb (102.5 kg)   09/21/18 222 lb (100.7 kg)   07/25/18 227 lb (103 kg)     Gen: alert, oriented, no acute distress  Head: normocephalic, atraumatic  Eyes:sclera clear, conjunctiva clear  Oral: moist mucus membranes, no oral lesions, no pharyngeal exudate, no pharyngeal erythema  Neck: symmetric normal sized thyroid, no carotid bruits, no JVD  Resp: Normal work of breathing, lungs CTAB, no w/r/r  CV: S1, S2 normal.  No murmurs, rubs, or gallops. Abd:  Normal bowel sounds. Soft, not tender, not distended. Skin: no rash             Extremities: no edema    No results found for this visit on 12/27/18. Assessment/ Plan:   Diagnoses and all orders for this visit:    1. Type 2 diabetes with nephropathy (HCC)  -     METABOLIC PANEL, COMPREHENSIVE  -     CBC WITH AUTOMATED DIFF  -     LIPID PANEL  -     TSH 3RD GENERATION    2. Essential hypertension  -     METABOLIC PANEL, COMPREHENSIVE  -     CBC WITH AUTOMATED DIFF  -     LIPID PANEL  -     TSH 3RD GENERATION    3. Exercise intolerance  -     METABOLIC PANEL, COMPREHENSIVE  -     CBC WITH AUTOMATED DIFF  -     LIPID PANEL  -     TSH 3RD GENERATION     check labs. Temporary decal paper work filled out for patient, discussed with him that he doesn't meet rationale for permanent decal.  He was upset about this. Advised him to build exercise back into his regimen and he will feel less deconditioned. Verbal and written instructions (see AVS) provided.  Patient expresses understanding of diagnosis and treatment plan. Maite Garza.  Yahaira Munson MD

## 2018-12-28 LAB
ALBUMIN SERPL-MCNC: 4.5 G/DL (ref 3.6–4.8)
ALBUMIN/GLOB SERPL: 2 {RATIO} (ref 1.2–2.2)
ALP SERPL-CCNC: 63 IU/L (ref 39–117)
ALT SERPL-CCNC: 18 IU/L (ref 0–44)
AST SERPL-CCNC: 16 IU/L (ref 0–40)
BASOPHILS # BLD AUTO: 0 X10E3/UL (ref 0–0.2)
BASOPHILS NFR BLD AUTO: 0 %
BILIRUB SERPL-MCNC: 0.5 MG/DL (ref 0–1.2)
BUN SERPL-MCNC: 23 MG/DL (ref 8–27)
BUN/CREAT SERPL: 19 (ref 10–24)
CALCIUM SERPL-MCNC: 9.7 MG/DL (ref 8.6–10.2)
CHLORIDE SERPL-SCNC: 101 MMOL/L (ref 96–106)
CHOLEST SERPL-MCNC: 143 MG/DL (ref 100–199)
CO2 SERPL-SCNC: 27 MMOL/L (ref 20–29)
CREAT SERPL-MCNC: 1.18 MG/DL (ref 0.76–1.27)
EOSINOPHIL # BLD AUTO: 0.4 X10E3/UL (ref 0–0.4)
EOSINOPHIL NFR BLD AUTO: 5 %
ERYTHROCYTE [DISTWIDTH] IN BLOOD BY AUTOMATED COUNT: 15.1 % (ref 12.3–15.4)
GLOBULIN SER CALC-MCNC: 2.3 G/DL (ref 1.5–4.5)
GLUCOSE SERPL-MCNC: 92 MG/DL (ref 65–99)
HCT VFR BLD AUTO: 42.2 % (ref 37.5–51)
HDLC SERPL-MCNC: 36 MG/DL
HGB BLD-MCNC: 14 G/DL (ref 13–17.7)
IMM GRANULOCYTES # BLD: 0.1 X10E3/UL (ref 0–0.1)
IMM GRANULOCYTES NFR BLD: 1 %
INTERPRETATION, 910389: NORMAL
LDLC SERPL CALC-MCNC: 83 MG/DL (ref 0–99)
LYMPHOCYTES # BLD AUTO: 0.8 X10E3/UL (ref 0.7–3.1)
LYMPHOCYTES NFR BLD AUTO: 9 %
Lab: NORMAL
MCH RBC QN AUTO: 29.1 PG (ref 26.6–33)
MCHC RBC AUTO-ENTMCNC: 33.2 G/DL (ref 31.5–35.7)
MCV RBC AUTO: 88 FL (ref 79–97)
MONOCYTES # BLD AUTO: 0.9 X10E3/UL (ref 0.1–0.9)
MONOCYTES NFR BLD AUTO: 9 %
NEUTROPHILS # BLD AUTO: 7.1 X10E3/UL (ref 1.4–7)
NEUTROPHILS NFR BLD AUTO: 76 %
PLATELET # BLD AUTO: 216 X10E3/UL (ref 150–379)
POTASSIUM SERPL-SCNC: 4.2 MMOL/L (ref 3.5–5.2)
PROT SERPL-MCNC: 6.8 G/DL (ref 6–8.5)
RBC # BLD AUTO: 4.81 X10E6/UL (ref 4.14–5.8)
SODIUM SERPL-SCNC: 142 MMOL/L (ref 134–144)
TRIGL SERPL-MCNC: 121 MG/DL (ref 0–149)
TSH SERPL DL<=0.005 MIU/L-ACNC: 2.51 UIU/ML (ref 0.45–4.5)
VLDLC SERPL CALC-MCNC: 24 MG/DL (ref 5–40)
WBC # BLD AUTO: 9.3 X10E3/UL (ref 3.4–10.8)

## 2019-01-08 ENCOUNTER — OFFICE VISIT (OUTPATIENT)
Dept: FAMILY MEDICINE CLINIC | Age: 64
End: 2019-01-08

## 2019-01-08 VITALS
DIASTOLIC BLOOD PRESSURE: 80 MMHG | TEMPERATURE: 97.7 F | OXYGEN SATURATION: 94 % | WEIGHT: 225 LBS | SYSTOLIC BLOOD PRESSURE: 126 MMHG | HEIGHT: 65 IN | RESPIRATION RATE: 14 BRPM | HEART RATE: 96 BPM | BODY MASS INDEX: 37.49 KG/M2

## 2019-01-08 DIAGNOSIS — E11.9 CONTROLLED TYPE 2 DIABETES MELLITUS WITHOUT COMPLICATION, WITHOUT LONG-TERM CURRENT USE OF INSULIN (HCC): ICD-10-CM

## 2019-01-08 DIAGNOSIS — M54.50 CHRONIC BILATERAL LOW BACK PAIN WITHOUT SCIATICA: Primary | ICD-10-CM

## 2019-01-08 DIAGNOSIS — R68.89 EXERCISE INTOLERANCE: ICD-10-CM

## 2019-01-08 DIAGNOSIS — G89.29 CHRONIC BILATERAL LOW BACK PAIN WITHOUT SCIATICA: Primary | ICD-10-CM

## 2019-01-08 DIAGNOSIS — M79.672 LEFT FOOT PAIN: ICD-10-CM

## 2019-01-08 NOTE — PROGRESS NOTES
JOON Cavanaugh is a 61 y.o. male who presents with back pain. Tells me that this has been going on for about a year that he is really noticed. It happens when he walks a distance, he can walk about 15-20 minutes before having to stop and rest.  He also describes a feeling that makes him stop as \"winded\". After he rests for a little while he going again. There is no cramping of the legs, no pain shooting down the legs. It is bilateral low back. He believes that this started at about the same time he noticed he was having some altered sensation in his feet. Ultimately this appeared to be due to diabetes which was diagnosed during hospital stay in 3/2018 with an A1c of 11. His diabetes under much better control now, he is taking metformin, he is eating better, he has lost weight. He is no longer taking insulin. Full sensation back in his feet. He also is concerned about an episode that happened to his left foot back in the summertime. There was some pain and swelling on the top of his foot. X-ray revealed several chronic but subtle changes in the foot. Pain is consistent with arch collapse and improved with wearing more supportive footwear. Not currently having any pain    PMHx:  Past Medical History:   Diagnosis Date    Hypertension        Meds:   Current Outpatient Medications   Medication Sig Dispense Refill    hydroCHLOROthiazide (HYDRODIURIL) 12.5 mg tablet Take 1 Tab by mouth daily. 30 Tab 5    glucose blood VI test strips (ASCENSIA AUTODISC VI, ONE TOUCH ULTRA TEST VI) strip Check blood sugar twice a day 180 Strip 1    lisinopril (PRINIVIL, ZESTRIL) 10 mg tablet Take 1 Tab by mouth daily. 90 Tab 3    sertraline (ZOLOFT) 25 mg tablet Take 1 Tab by mouth daily. 90 Tab 1    amLODIPine (NORVASC) 10 mg tablet Take 1 Tab by mouth daily.  90 Tab 0    flash glucose scanning reader (FREESTYLE MARC READER) misc Check blood sugar fasting daily 1 Kit 0    flash glucose sensor (FREESTYLE MARC SENSOR) kit Check fasting blood sugars daily 1 Kit 0    metFORMIN (GLUCOPHAGE) 1,000 mg tablet Take 1 Tab by mouth two (2) times daily (with meals). 180 Tab 3    indomethacin (INDOCIN) 50 mg capsule Take 1 pill TID PO for duration of gout flare 90 Cap 0    Lancets misc Check blood glucose fasting and at bed time 200 Each 3    allopurinol (ZYLOPRIM) 100 mg tablet Take 2 Tabs by mouth daily. 60 Tab 11    aspirin delayed-release 81 mg tablet Take 1 Tab by mouth daily. 30 Tab 0    melatonin 3 mg tablet Take 1 Tab by mouth nightly as needed. 30 Tab 0    insulin glargine (LANTUS,BASAGLAR) 100 unit/mL (3 mL) inpn Inject 12 units SQ daily 3 mL 2    Insulin Needles, Disposable, (BD ULTRA-FINE MICRO PEN NEEDLE) 32 gauge x 1/4\" ndle Inject insulin SQ daily 90 Pen Needle 1    insulin glargine (LANTUS) 100 unit/mL injection 12 Units by SubCUTAneous route nightly. 1 Vial 0    Nebulizer Accessories kit by Nebulization route every six (6) hours as needed for Cough. 1 Kit 0    Insulin Needles, Disposable, 31 gauge x 5/16\" ndle by SubCUTAneous route nightly. 1 Package 0       Allergies:   No Known Allergies    Smoker:  Social History     Tobacco Use   Smoking Status Former Smoker   Smokeless Tobacco Never Used       ETOH:   Social History     Substance and Sexual Activity   Alcohol Use Yes       FH: No family history on file. ROS:   As listed in HPI. In addition:  Constitutional:   No headache, fever, fatigue, weight loss or weight gain      Cardiac:    No chest pain      Resp:   No cough or shortness of breath      Neuro   No loss of consciousness, dizziness, seizures      Physical Exam:  Blood pressure 126/80, pulse 96, temperature 97.7 °F (36.5 °C), resp. rate 14, height 5' 5\" (1.651 m), weight 225 lb (102.1 kg), SpO2 94 %. GEN: No apparent distress. Alert and oriented and responds to all questions appropriately. NEUROLOGIC:  No focal neurologic deficits. Strength and sensation grossly intact.   Coordination and gait grossly intact. EXT: Well perfused. No edema. SKIN: No obvious rashes. Feet examined. He has relatively flat arches and no pain today. Ankle is stable. The area then he remembers having a problem is over the base of the first metatarsal    Low back pain, discomfort is in the low back bilaterally and the erector spinae with palpation of this area action makes him feel little bit better. Assessment and Plan     Diabetes  Brought his fasting blood sugars and they are consistently 95-1 15  Metformin 1000 mg twice daily  No longer taking Lantus      Low back pain, bilateral  Would be amenable to physical therapy. Based on the history of getting worse after 15-20 minutes of straight walking sounds like he might have a little low back arthritis. The only imaging we have directly the spine which did show \"degenerative changes\"  Try physical therapy and see if this works  Given the number for orthopedics should he like to avail himself of this    Reassured him about the foot, this appears to be doing better. Recommended he maintain arch support, avoid barefoot in sand or walking if possible. His diabetes is under good control, do not less success good urine    Has a history of gout takes allopurinol twice daily. I do not see that we have checked uric acid recently    I recommend that we check in on him 3-6 months, full panel labs including uric acid at that time      ICD-10-CM ICD-9-CM    1. Chronic bilateral low back pain without sciatica M54.5 724.2 REFERRAL TO ORTHOPEDICS    G89.29 338.29    2. Left foot pain M79.672 729.5    3. Controlled type 2 diabetes mellitus without complication, without long-term current use of insulin ([facility]) E11.9 250.00    4. Exercise intolerance R68.89 780.99        AVS given.  Pt expressed understanding of instructions

## 2019-01-08 NOTE — PROGRESS NOTES
.  Chief Complaint   Patient presents with    Form Completion     [employer] handicap plates      . 1. Have you been to the ER, urgent care clinic since your last visit? Hospitalized since your last visit? no    2. Have you seen or consulted any other health care providers outside of the [address] [place] [address] since your last visit? Include any pap smears or colon screening. No    .1. Have you been to the ER, urgent care clinic since your last visit? No    .  Health Maintenance Due   Topic Date Due    DTaP/Tdap/Td series (1 - Tdap) 05/17/1976    Shingrix Vaccine Age 50> (1 of 2) 05/17/2005     . Jimmy Macario

## 2019-01-23 ENCOUNTER — OFFICE VISIT (OUTPATIENT)
Dept: FAMILY MEDICINE CLINIC | Age: 64
End: 2019-01-23

## 2019-01-23 VITALS
DIASTOLIC BLOOD PRESSURE: 82 MMHG | TEMPERATURE: 98.2 F | SYSTOLIC BLOOD PRESSURE: 126 MMHG | WEIGHT: 226 LBS | OXYGEN SATURATION: 95 % | BODY MASS INDEX: 37.65 KG/M2 | HEART RATE: 110 BPM | HEIGHT: 65 IN | RESPIRATION RATE: 16 BRPM

## 2019-01-23 DIAGNOSIS — J06.9 URI WITH COUGH AND CONGESTION: Primary | ICD-10-CM

## 2019-01-23 RX ORDER — BENZONATATE 200 MG/1
200 CAPSULE ORAL
Qty: 21 CAP | Refills: 1 | Status: SHIPPED | OUTPATIENT
Start: 2019-01-23 | End: 2019-01-30

## 2019-01-23 RX ORDER — AZITHROMYCIN 250 MG/1
TABLET, FILM COATED ORAL
Qty: 6 TAB | Refills: 0 | Status: SHIPPED | OUTPATIENT
Start: 2019-01-23 | End: 2019-01-28

## 2019-01-23 NOTE — PROGRESS NOTES
.  Chief Complaint   Patient presents with    Cold Symptoms     . 1. Have you been to the ER, urgent care clinic since your last visit? Hospitalized since your last visit? no    2. Have you seen or consulted any other health care providers outside of the [address] [place] [address] since your last visit? Include any pap smears or colon screening. No    .  Health Maintenance Due   Topic Date Due    DTaP/Tdap/Td series (1 - Tdap) 05/17/1976    Shingrix Vaccine Age 50> (1 of 2) 05/17/2005     . Torrey Solorio

## 2019-01-23 NOTE — PROGRESS NOTES
HPI  Kady Burns is a 61 y.o. male who presents with congestion, cough. Started 3 days ago. poor sleep, drinking fluids. Other symptoms include none. Denies fever, wheezing. His partner was sick with an upper respiratory infection for about 5 days. Got better on its own. He has tried Mucinex with some relief    PMHx:  Past Medical History:   Diagnosis Date    Hypertension        Meds:   Current Outpatient Medications   Medication Sig Dispense Refill    azithromycin (ZITHROMAX) 250 mg tablet Take 2 tablets today, then take 1 tablet daily 6 Tab 0    benzonatate (TESSALON) 200 mg capsule Take 1 Cap by mouth three (3) times daily as needed for Cough for up to 7 days. 21 Cap 1    hydroCHLOROthiazide (HYDRODIURIL) 12.5 mg tablet Take 1 Tab by mouth daily. 30 Tab 5    glucose blood VI test strips (ASCENSIA AUTODISC VI, ONE TOUCH ULTRA TEST VI) strip Check blood sugar twice a day 180 Strip 1    lisinopril (PRINIVIL, ZESTRIL) 10 mg tablet Take 1 Tab by mouth daily. 90 Tab 3    sertraline (ZOLOFT) 25 mg tablet Take 1 Tab by mouth daily. 90 Tab 1    amLODIPine (NORVASC) 10 mg tablet Take 1 Tab by mouth daily. 90 Tab 0    flash glucose scanning reader (FREESTYLE MARC READER) misc Check blood sugar fasting daily 1 Kit 0    flash glucose sensor (FREESTYLE MARC SENSOR) kit Check fasting blood sugars daily 1 Kit 0    metFORMIN (GLUCOPHAGE) 1,000 mg tablet Take 1 Tab by mouth two (2) times daily (with meals). 180 Tab 3    indomethacin (INDOCIN) 50 mg capsule Take 1 pill TID PO for duration of gout flare 90 Cap 0    Lancets misc Check blood glucose fasting and at bed time 200 Each 3    allopurinol (ZYLOPRIM) 100 mg tablet Take 2 Tabs by mouth daily. 60 Tab 11    aspirin delayed-release 81 mg tablet Take 1 Tab by mouth daily. 30 Tab 0    melatonin 3 mg tablet Take 1 Tab by mouth nightly as needed.  30 Tab 0    insulin glargine (LANTUS,BASAGLAR) 100 unit/mL (3 mL) inpn Inject 12 units SQ daily 3 mL 2    Insulin Needles, Disposable, (BD ULTRA-FINE MICRO PEN NEEDLE) 32 gauge x 1/4\" ndle Inject insulin SQ daily 90 Pen Needle 1    insulin glargine (LANTUS) 100 unit/mL injection 12 Units by SubCUTAneous route nightly. 1 Vial 0    Nebulizer Accessories kit by Nebulization route every six (6) hours as needed for Cough. 1 Kit 0    Insulin Needles, Disposable, 31 gauge x 5/16\" ndle by SubCUTAneous route nightly. 1 Package 0       Allergies:   No Known Allergies    Smoker:  Social History     Tobacco Use   Smoking Status Former Smoker   Smokeless Tobacco Never Used       ETOH:   Social History     Substance and Sexual Activity   Alcohol Use Yes       FH: No family history on file. ROS:  As listed in HPI. In addition:  Constitutional:   No headache, fever, fatigue, weight loss or weight gain      Eyes:   No redness, pruritis, pain, visual changes, swelling, or discharge      Ears:    No pain, loss or changes in hearing     Cardiac:    No chest pain      Resp:   No shortness of breath or wheeze     Neuro   No loss of consciousness, dizziness, seizure    Physical Exam:  Blood pressure 126/82, pulse (!) 110, temperature 98.2 °F (36.8 °C), resp. rate 16, height 5' 5\" (1.651 m), weight 226 lb (102.5 kg), SpO2 95 %. GEN: No apparent distress. EYES:  Conjunctiva clear  EAR: TM are clear and without effusion. NOSE: Turbinates are congested  OROPHYARYNX: No erythema or tonsilar exudates  NECK:  Submandibular LAD. Non tender         LUNGS: Respirations unlabored; clear to auscultation bilaterally. No wheeze  CARDIOVASCULAR: Regular, rate, and rhythm  ABDOMEN: Soft; nontender;nl BS  SKIN: No obvious rashes. Assessment and Plan     Viral URI  Expect ssx to last 6-7 days  Supportive care is best, provided a handout on available OTC remedies  Nasal steroid OTC for congestion  Honey in warm water for cough    He is really worried about how sick he got last year.   Had an upper respiratory infection and ultimately ended up with pneumonia. Warned him that he needs to wash hands and do other infection control. He would like a Z-Todd for his comfort    RTC if ssx worsen or fail to improve as expected      ICD-10-CM ICD-9-CM    1. URI with cough and congestion J06.9 465.9 azithromycin (ZITHROMAX) 250 mg tablet      benzonatate (TESSALON) 200 mg capsule       AVS given.  Pt expressed understanding of instructions

## 2019-01-28 DIAGNOSIS — I10 ESSENTIAL HYPERTENSION: ICD-10-CM

## 2019-01-28 RX ORDER — AMLODIPINE BESYLATE 10 MG/1
10 TABLET ORAL DAILY
Qty: 90 TAB | Refills: 3 | Status: SHIPPED | OUTPATIENT
Start: 2019-01-28 | End: 2020-01-21 | Stop reason: SDUPTHER

## 2019-01-28 NOTE — TELEPHONE ENCOUNTER
Chief Complaint   Patient presents with    Medication Refill     Last refill:   3 months ago (10/18/2018)   amLODIPine (NORVASC) 10 mg tablet   Take 1 Tab by mouth daily.    Dispense: 90 Tab     Refills: 0     Start: 10/18/2018     By: Roxy Tee MD      Last Ov: 1/23/19

## 2019-01-28 NOTE — TELEPHONE ENCOUNTER
[facility] is requesting a refill on med    Requested Prescriptions     Pending Prescriptions Disp Refills    amLODIPine (NORVASC) 10 mg tablet 90 Tab 0     Sig: Take 1 Tab by mouth daily.

## 2019-04-08 ENCOUNTER — OFFICE VISIT (OUTPATIENT)
Dept: FAMILY MEDICINE CLINIC | Age: 64
End: 2019-04-08

## 2019-04-08 VITALS
TEMPERATURE: 98.2 F | DIASTOLIC BLOOD PRESSURE: 73 MMHG | WEIGHT: 225 LBS | RESPIRATION RATE: 19 BRPM | HEART RATE: 90 BPM | OXYGEN SATURATION: 93 % | BODY MASS INDEX: 37.49 KG/M2 | HEIGHT: 65 IN | SYSTOLIC BLOOD PRESSURE: 110 MMHG

## 2019-04-08 DIAGNOSIS — E66.01 OBESITY, MORBID (HCC): ICD-10-CM

## 2019-04-08 DIAGNOSIS — J01.00 ACUTE NON-RECURRENT MAXILLARY SINUSITIS: Primary | ICD-10-CM

## 2019-04-08 DIAGNOSIS — E11.21 TYPE 2 DIABETES WITH NEPHROPATHY (HCC): ICD-10-CM

## 2019-04-08 PROBLEM — E11.9 NEWLY DIAGNOSED DIABETES (HCC): Status: RESOLVED | Noted: 2018-03-22 | Resolved: 2019-04-08

## 2019-04-08 PROBLEM — J96.90 RESPIRATORY FAILURE (HCC): Status: RESOLVED | Noted: 2018-03-19 | Resolved: 2019-04-08

## 2019-04-08 PROBLEM — N17.9 ACUTE KIDNEY FAILURE (HCC): Status: RESOLVED | Noted: 2018-03-22 | Resolved: 2019-04-08

## 2019-04-08 RX ORDER — BENZONATATE 200 MG/1
200 CAPSULE ORAL
Qty: 40 CAP | Refills: 1 | Status: SHIPPED | OUTPATIENT
Start: 2019-04-08 | End: 2019-04-15

## 2019-04-08 RX ORDER — CODEINE PHOSPHATE AND GUAIFENESIN 10; 100 MG/5ML; MG/5ML
5 SOLUTION ORAL
Qty: 60 ML | Refills: 0 | Status: SHIPPED | OUTPATIENT
Start: 2019-04-08 | End: 2019-04-11

## 2019-04-08 RX ORDER — AZITHROMYCIN 250 MG/1
TABLET, FILM COATED ORAL
Qty: 6 TAB | Refills: 0 | Status: SHIPPED | OUTPATIENT
Start: 2019-04-08 | End: 2019-04-13

## 2019-04-08 NOTE — PROGRESS NOTES
Chief Complaint Patient presents with  Cough  Nasal Congestion Pt reports that he was out in the rain several days ago, started with cough, congestion and chills 3-4 days ago. Pt reports that cough is keeping him up at night. Subjective:  
Laura Yañez is a 61 y.o. male who complains of congestion, sore throat and dry cough for 4 days, gradually worsening since that time. He denies a history of shortness of breath and wheezing. Evaluation to date: none. Treatment to date: OTC products. Patient does not smoke cigarettes. Relevant PMH: No pertinent additional PMH. Patient Active Problem List  
Diagnosis Code  Acute bronchitis J20.9  Hypertension I10  
 Depression F32.9  Obesity, morbid ([facility] E66.01  
 Type 2 diabetes with nephropathy ([facility] [address] E11.21 Current Outpatient Medications Medication Sig Dispense Refill  glucose blood VI test strips (ASCENSIA AUTODISC VI, ONE TOUCH ULTRA TEST VI) strip Check blood sugar twice a day E11.9 200 Strip 11  
 azithromycin (ZITHROMAX Z-PAYTON) 250 mg tablet Please take as written. 6 Tab 0  
 benzonatate (TESSALON) 200 mg capsule Take 1 Cap by mouth three (3) times daily as needed for Cough for up to 7 days. 40 Cap 1  
 amLODIPine (NORVASC) 10 mg tablet Take 1 Tab by mouth daily. 90 Tab 3  
 hydroCHLOROthiazide (HYDRODIURIL) 12.5 mg tablet Take 1 Tab by mouth daily. 30 Tab 5  
 lisinopril (PRINIVIL, ZESTRIL) 10 mg tablet Take 1 Tab by mouth daily. 90 Tab 3  
 sertraline (ZOLOFT) 25 mg tablet Take 1 Tab by mouth daily. 90 Tab 1  
 metFORMIN (GLUCOPHAGE) 1,000 mg tablet Take 1 Tab by mouth two (2) times daily (with meals). 180 Tab 3  
 indomethacin (INDOCIN) 50 mg capsule Take 1 pill TID PO for duration of gout flare 90 Cap 0  
 Lancets misc Check blood glucose fasting and at bed time 200 Each 3  
 allopurinol (ZYLOPRIM) 100 mg tablet Take 2 Tabs by mouth daily.  60 Tab 11  
  aspirin delayed-release 81 mg tablet Take 1 Tab by mouth daily. 30 Tab 0  
 melatonin 3 mg tablet Take 1 Tab by mouth nightly as needed. 30 Tab 0  
 Nebulizer Accessories kit by Nebulization route every six (6) hours as needed for Cough. 1 Kit 0  
 Insulin Needles, Disposable, (BD ULTRA-FINE MICRO PEN NEEDLE) 32 gauge x 1/4\" ndle Inject insulin SQ daily 90 Pen Needle 1  
 Insulin Needles, Disposable, 31 gauge x 5/16\" ndle by SubCUTAneous route nightly. 1 Package 0 No Known Allergies Review of Systems Pertinent items are noted in HPI. Objective:  
 
Visit Vitals /73 (BP 1 Location: Left arm, BP Patient Position: Sitting) Pulse 90 Temp 98.2 °F (36.8 °C) (Oral) Resp 19 Ht 5' 5\" (1.651 m) Wt 225 lb (102.1 kg) SpO2 93% BMI 37.44 kg/m² General:  alert, cooperative, no distress Eyes: conjunctivae/corneas clear. PERRL, EOM's intact. Fundi benign Ears: normal TM's and external ear canals AU Sinuses: tenderness over generalized maxillary Mouth:  Lips, mucosa, and tongue normal. Teeth and gums normal  
Neck: supple, symmetrical, trachea midline and no adenopathy. Heart: S1 and S2 normal, no murmurs noted. Lungs: clear to auscultation bilaterally Abdomen: soft, non-tender. Bowel sounds normal. No masses,  no organomegaly Assessment/Plan:  
sinusitis Suggested symptomatic OTC remedies. Antibiotics per orders. RTC prn. ICD-10-CM ICD-9-CM 1. Acute non-recurrent maxillary sinusitis J01.00 461.0 azithromycin (ZITHROMAX Z-PAYTON) 250 mg tablet  
   benzonatate (TESSALON) 200 mg capsule 2. Obesity, morbid ([address] E66.01 278.01   
3. Type 2 diabetes with nephropathy (H[facility]) E11.21 250.40 glucose blood VI test strips (ASCENSIA AUTODISC VI, ONE TOUCH ULTRA TEST VI) strip 583.81 Encounter Diagnoses Name Primary?  Acute non-recurrent maxillary sinusitis Yes  Obesity, morbid ([address]  Type 2 diabetes with nephropathy ([address] Orders Placed This Encounter  glucose blood VI test strips (ASCENSIA AUTODISC VI, ONE TOUCH ULTRA TEST VI) strip  azithromycin (ZITHROMAX Z-PAYTON) 250 mg tablet  benzonatate (TESSALON) 200 mg capsule  guaiFENesin-codeine (ROBITUSSIN AC) 100-10 mg/5 mL solution Reviewed use of codeine cough syrup, small quantity provided for short term use. Discussed the patient's BMI with him. The BMI follow up plan is as follows:  
 
dietary management education, guidance, and counseling 
encourage exercise 
monitor weight 
prescribed dietary intake An After Visit Summary was printed and given to the patient.

## 2019-04-08 NOTE — PATIENT INSTRUCTIONS
Body Mass Index: Care Instructions Your Care Instructions Body mass index (BMI) can help you see if your weight is raising your risk for health problems. It uses a formula to compare how much you weigh with how tall you are. · A BMI lower than 18.5 is considered underweight. · A BMI between 18.5 and 24.9 is considered healthy. · A BMI between 25 and 29.9 is considered overweight. A BMI of 30 or higher is considered obese. If your BMI is in the normal range, it means that you have a lower risk for weight-related health problems. If your BMI is in the overweight or obese range, you may be at increased risk for weight-related health problems, such as high blood pressure, heart disease, stroke, arthritis or joint pain, and diabetes. If your BMI is in the underweight range, you may be at increased risk for health problems such as fatigue, lower protection (immunity) against illness, muscle loss, bone loss, hair loss, and hormone problems. BMI is just one measure of your risk for weight-related health problems. You may be at higher risk for health problems if you are not active, you eat an unhealthy diet, or you drink too much alcohol or use tobacco products. Follow-up care is a key part of your treatment and safety. Be sure to make and go to all appointments, and call your doctor if you are having problems. It's also a good idea to know your test results and keep a list of the medicines you take. How can you care for yourself at home? · Practice healthy eating habits. This includes eating plenty of fruits, vegetables, whole grains, lean protein, and low-fat dairy. · If your doctor recommends it, get more exercise. Walking is a good choice. Bit by bit, increase the amount you walk every day. Try for at least 30 minutes on most days of the week. · Do not smoke. Smoking can increase your risk for health problems.  If you need help quitting, talk to your doctor about stop-smoking programs and medicines. These can increase your chances of quitting for good. · Limit alcohol to 2 drinks a day for men and 1 drink a day for women. Too much alcohol can cause health problems. If you have a BMI higher than 25 · Your doctor may do other tests to check your risk for weight-related health problems. This may include measuring the distance around your waist. A waist measurement of more than 40 inches in men or 35 inches in women can increase the risk of weight-related health problems. · Talk with your doctor about steps you can take to stay healthy or improve your health. You may need to make lifestyle changes to lose weight and stay healthy, such as changing your diet and getting regular exercise. If you have a BMI lower than 18.5 · Your doctor may do other tests to check your risk for health problems. · Talk with your doctor about steps you can take to stay healthy or improve your health. You may need to make lifestyle changes to gain or maintain weight and stay healthy, such as getting more healthy foods in your diet and doing exercises to build muscle. Where can you learn more? Go to http://merari-paul.info/. Enter S176 in the search box to learn more about \"Body Mass Index: Care Instructions. \" Current as of: October 13, 2016 Content Version: 11.4 © 3584-5397 [employer]. Care instructions adapted under license by [employer] (which disclaims liability or warranty for this information). If you have questions about a medical condition or this instruction, always ask your healthcare professional. [address] any warranty or liability for your use of this information.

## 2019-04-30 DIAGNOSIS — Z87.39 HISTORY OF GOUT: ICD-10-CM

## 2019-04-30 RX ORDER — ALLOPURINOL 100 MG/1
200 TABLET ORAL DAILY
Qty: 60 TAB | Refills: 11 | Status: SHIPPED | OUTPATIENT
Start: 2019-04-30 | End: 2020-04-22 | Stop reason: SDUPTHER

## 2019-04-30 NOTE — TELEPHONE ENCOUNTER
Requested Prescriptions     Pending Prescriptions Disp Refills    allopurinol (ZYLOPRIM) 100 mg tablet 60 Tab 11     Sig: Take 2 Tabs by mouth daily. --Cetirizine      Request made by pharmacy.

## 2019-04-30 NOTE — TELEPHONE ENCOUNTER
Chief Complaint   Patient presents with    Medication Refill     Last refill: 4/20/2018  Last Ov: 4/8/19

## 2019-05-01 DIAGNOSIS — F32.A DEPRESSION, UNSPECIFIED DEPRESSION TYPE: ICD-10-CM

## 2019-05-01 DIAGNOSIS — F33.0 MILD EPISODE OF RECURRENT MAJOR DEPRESSIVE DISORDER (HCC): ICD-10-CM

## 2019-05-01 RX ORDER — SERTRALINE HYDROCHLORIDE 25 MG/1
25 TABLET, FILM COATED ORAL DAILY
Qty: 90 TAB | Refills: 3 | Status: SHIPPED | OUTPATIENT
Start: 2019-05-01 | End: 2020-04-22 | Stop reason: SDUPTHER

## 2019-05-02 DIAGNOSIS — F33.0 MILD EPISODE OF RECURRENT MAJOR DEPRESSIVE DISORDER (HCC): ICD-10-CM

## 2019-05-02 DIAGNOSIS — F32.A DEPRESSION, UNSPECIFIED DEPRESSION TYPE: ICD-10-CM

## 2019-05-02 RX ORDER — SERTRALINE HYDROCHLORIDE 25 MG/1
25 TABLET, FILM COATED ORAL DAILY
Qty: 90 TAB | Refills: 3 | Status: CANCELLED | OUTPATIENT
Start: 2019-05-02

## 2019-05-02 NOTE — TELEPHONE ENCOUNTER
Requested Prescriptions     Pending Prescriptions Disp Refills    sertraline (ZOLOFT) 25 mg tablet 90 Tab 3     Sig: Take 1 Tab by mouth daily. Requested by [facility].

## 2019-05-20 DIAGNOSIS — Z87.39 HISTORY OF GOUT: ICD-10-CM

## 2019-05-20 RX ORDER — INDOMETHACIN 50 MG/1
CAPSULE ORAL
Qty: 90 CAP | Refills: 0 | Status: SHIPPED | OUTPATIENT
Start: 2019-05-20

## 2019-05-20 NOTE — TELEPHONE ENCOUNTER
Requested Prescriptions     Pending Prescriptions Disp Refills    indomethacin (INDOCIN) 50 mg capsule 90 Cap 0     Sig: Take 1 pill TID PO for duration of gout flare       Requested by pharmacy.

## 2019-06-27 ENCOUNTER — OFFICE VISIT (OUTPATIENT)
Dept: FAMILY MEDICINE CLINIC | Age: 64
End: 2019-06-27

## 2019-06-27 VITALS
DIASTOLIC BLOOD PRESSURE: 84 MMHG | RESPIRATION RATE: 16 BRPM | WEIGHT: 222.2 LBS | TEMPERATURE: 98.4 F | HEART RATE: 92 BPM | BODY MASS INDEX: 37.02 KG/M2 | HEIGHT: 65 IN | SYSTOLIC BLOOD PRESSURE: 122 MMHG | OXYGEN SATURATION: 96 %

## 2019-06-27 DIAGNOSIS — M10.9 GOUT, UNSPECIFIED CAUSE, UNSPECIFIED CHRONICITY, UNSPECIFIED SITE: ICD-10-CM

## 2019-06-27 DIAGNOSIS — E11.21 TYPE 2 DIABETES WITH NEPHROPATHY (HCC): ICD-10-CM

## 2019-06-27 DIAGNOSIS — Z12.11 COLON CANCER SCREENING: ICD-10-CM

## 2019-06-27 DIAGNOSIS — Z00.00 ROUTINE GENERAL MEDICAL EXAMINATION AT A HEALTH CARE FACILITY: Primary | ICD-10-CM

## 2019-06-27 DIAGNOSIS — I10 ESSENTIAL HYPERTENSION: ICD-10-CM

## 2019-06-27 LAB
ALBUMIN UR QL STRIP: 150 MG/L
CREATININE, URINE POC: 100 MG/DL
HBA1C MFR BLD HPLC: 5.5 %
MICROALBUMIN/CREAT RATIO POC: >300 MG/G

## 2019-06-27 NOTE — PROGRESS NOTES
HPI  Yi Madera is a 59 y.o. male who presents to follow-up on chronic medical issues. He is really watching his diet and his blood sugars under much better control. He has a concern about gout. Tells me that he is taking indomethacin about once a week. About 3 days out of the week you feel like his big toe is red and swollen when he first gets up in the morning. Does not sound like the pain is incredibly bad at the gout level but every now and then he does feel the need to take indomethacin. He is faithfully taking allopurinol. Miky mcguire has had an x-ray of the foot which showed chronic but stable changes in the foot. His pain in the past has been consistent with arch collapse    PMHx:  Past Medical History:   Diagnosis Date    Hypertension        Meds:   Current Outpatient Medications   Medication Sig Dispense Refill    indomethacin (INDOCIN) 50 mg capsule Take 1 pill TID PO for duration of gout flare 90 Cap 0    sertraline (ZOLOFT) 25 mg tablet Take 1 Tab by mouth daily. 90 Tab 3    allopurinol (ZYLOPRIM) 100 mg tablet Take 2 Tabs by mouth daily. 60 Tab 11    glucose blood VI test strips (ASCENSIA AUTODISC VI, ONE TOUCH ULTRA TEST VI) strip Check blood sugar twice a day E11.9 200 Strip 11    amLODIPine (NORVASC) 10 mg tablet Take 1 Tab by mouth daily. 90 Tab 3    hydroCHLOROthiazide (HYDRODIURIL) 12.5 mg tablet Take 1 Tab by mouth daily. 30 Tab 5    lisinopril (PRINIVIL, ZESTRIL) 10 mg tablet Take 1 Tab by mouth daily. 90 Tab 3    metFORMIN (GLUCOPHAGE) 1,000 mg tablet Take 1 Tab by mouth two (2) times daily (with meals). 180 Tab 3    Lancets misc Check blood glucose fasting and at bed time 200 Each 3    aspirin delayed-release 81 mg tablet Take 1 Tab by mouth daily. 30 Tab 0    melatonin 3 mg tablet Take 1 Tab by mouth nightly as needed.  30 Tab 0    Insulin Needles, Disposable, (BD ULTRA-FINE MICRO PEN NEEDLE) 32 gauge x 1/4\" ndle Inject insulin SQ daily 90 Pen Needle 1    Nebulizer Accessories kit by Nebulization route every six (6) hours as needed for Cough. 1 Kit 0    Insulin Needles, Disposable, 31 gauge x 5/16\" ndle by SubCUTAneous route nightly. 1 Package 0       Allergies:   No Known Allergies    Smoker:  Social History     Tobacco Use   Smoking Status Former Smoker   Smokeless Tobacco Never Used       ETOH:   Social History     Substance and Sexual Activity   Alcohol Use Yes       FH: No family history on file. ROS:   As listed in HPI. In addition:  Constitutional:   No headache, fever, fatigue, weight loss or weight gain      Cardiac:    No chest pain      Resp:   No cough or shortness of breath      Neuro   No loss of consciousness, dizziness, seizures      Physical Exam:  Blood pressure 122/84, pulse 92, temperature 98.4 °F (36.9 °C), temperature source Oral, resp. rate 16, height 5' 5\" (1.651 m), weight 222 lb 3.2 oz (100.8 kg), SpO2 96 %. GEN: No apparent distress. Alert and oriented and responds to all questions appropriately. NEUROLOGIC:  No focal neurologic deficits. Strength and sensation grossly intact. Coordination and gait grossly intact. EXT: Well perfused. No edema. SKIN: No obvious rashes. Assessment and Plan      diabetes  A1c 5.5%, has been in the 5 range for 1 year now. Diagnosed 2018 A1c 11.7  Metformin 1000 mg twice daily  . Weight is currently stable    Gout  Complaining of toe pain that may or may not be gout. Certainly if he requires indomethacin once a week then we need to adjust his allopurinol dose. Check a uric acid. If uric acid is high we will adjust allopurinol, uric acid is low than weekly gout flare is unlikely and will give him diclofenac for osteoarthritis pain    Proteinuria, taking lisinopril    Taking aspirin a day    Hypertension  Blood pressure well controlled  Amlodipine 10 mg  Hydrochlorothiazide 12.5 mg  Keep up the good work.   Maintain current dose of metformin until he loses some weight    6-month follow-up ICD-10-CM ICD-9-CM    1. Routine general medical examination at a health care facility Z00.00 V70.0    2. Type 2 diabetes with nephropathy (HCC) E11.21 250.40 AMB POC HEMOGLOBIN A1C     583.81 AMB POC URINE, MICROALBUMIN, SEMIQUANT (3 RESULTS)   3. Essential hypertension E23 497.9 METABOLIC PANEL, COMPREHENSIVE      LIPID PANEL      CBC WITH AUTOMATED DIFF   4. BMI 36.0-36.9,adult Z68.36 V85.36    5. Gout, unspecified cause, unspecified chronicity, unspecified site M10.9 274.9 URIC ACID   6. Colon cancer screening Z12.11 V76.51 OCCULT BLOOD IMMUNOASSAY,DIAGNOSTIC       AVS given.  Pt expressed understanding of instructions

## 2019-06-27 NOTE — PROGRESS NOTES
.  Chief Complaint   Patient presents with    Diabetes    Gout     bump on foot and finger      . 1. Have you been to the ER, urgent care clinic since your last visit? Hospitalized since your last visit? no    2. Have you seen or consulted any other health care providers outside of the [facility] since your last visit? Include any pap smears or colon screening. No    .  Health Maintenance Due   Topic Date Due    DTaP/Tdap/Td series (1 - Tdap) 05/17/1976    Shingrix Vaccine Age 50> (1 of 2) 05/17/2005    MICROALBUMIN Q1  04/03/2019    FOBT Q 1 YEAR AGE 50-75  05/29/2019    HEMOGLOBIN A1C Q6M  06/27/2019     . Reginald Shady

## 2019-06-28 ENCOUNTER — TELEPHONE (OUTPATIENT)
Dept: FAMILY MEDICINE CLINIC | Age: 64
End: 2019-06-28

## 2019-06-28 LAB
ALBUMIN SERPL-MCNC: 4.9 G/DL (ref 3.6–4.8)
ALBUMIN/GLOB SERPL: 2.5 {RATIO} (ref 1.2–2.2)
ALP SERPL-CCNC: 64 IU/L (ref 39–117)
ALT SERPL-CCNC: 16 IU/L (ref 0–44)
AST SERPL-CCNC: 18 IU/L (ref 0–40)
BASOPHILS # BLD AUTO: 0 X10E3/UL (ref 0–0.2)
BASOPHILS NFR BLD AUTO: 0 %
BILIRUB SERPL-MCNC: 0.7 MG/DL (ref 0–1.2)
BUN SERPL-MCNC: 24 MG/DL (ref 8–27)
BUN/CREAT SERPL: 20 (ref 10–24)
CALCIUM SERPL-MCNC: 9.7 MG/DL (ref 8.6–10.2)
CHLORIDE SERPL-SCNC: 103 MMOL/L (ref 96–106)
CHOLEST SERPL-MCNC: 143 MG/DL (ref 100–199)
CO2 SERPL-SCNC: 25 MMOL/L (ref 20–29)
CREAT SERPL-MCNC: 1.2 MG/DL (ref 0.76–1.27)
EOSINOPHIL # BLD AUTO: 0.8 X10E3/UL (ref 0–0.4)
EOSINOPHIL NFR BLD AUTO: 10 %
ERYTHROCYTE [DISTWIDTH] IN BLOOD BY AUTOMATED COUNT: 14.7 % (ref 12.3–15.4)
GLOBULIN SER CALC-MCNC: 2 G/DL (ref 1.5–4.5)
GLUCOSE SERPL-MCNC: 84 MG/DL (ref 65–99)
HCT VFR BLD AUTO: 42.1 % (ref 37.5–51)
HDLC SERPL-MCNC: 34 MG/DL
HGB BLD-MCNC: 13.9 G/DL (ref 13–17.7)
IMM GRANULOCYTES # BLD AUTO: 0.1 X10E3/UL (ref 0–0.1)
IMM GRANULOCYTES NFR BLD AUTO: 1 %
INTERPRETATION, 910389: NORMAL
LDLC SERPL CALC-MCNC: 84 MG/DL (ref 0–99)
LYMPHOCYTES # BLD AUTO: 1.2 X10E3/UL (ref 0.7–3.1)
LYMPHOCYTES NFR BLD AUTO: 16 %
MCH RBC QN AUTO: 28.8 PG (ref 26.6–33)
MCHC RBC AUTO-ENTMCNC: 33 G/DL (ref 31.5–35.7)
MCV RBC AUTO: 87 FL (ref 79–97)
MONOCYTES # BLD AUTO: 0.7 X10E3/UL (ref 0.1–0.9)
MONOCYTES NFR BLD AUTO: 10 %
NEUTROPHILS # BLD AUTO: 4.9 X10E3/UL (ref 1.4–7)
NEUTROPHILS NFR BLD AUTO: 63 %
PLATELET # BLD AUTO: 233 X10E3/UL (ref 150–450)
POTASSIUM SERPL-SCNC: 4.9 MMOL/L (ref 3.5–5.2)
PROT SERPL-MCNC: 6.9 G/DL (ref 6–8.5)
RBC # BLD AUTO: 4.82 X10E6/UL (ref 4.14–5.8)
SODIUM SERPL-SCNC: 143 MMOL/L (ref 134–144)
TRIGL SERPL-MCNC: 124 MG/DL (ref 0–149)
URATE SERPL-MCNC: 5.4 MG/DL (ref 3.7–8.6)
VLDLC SERPL CALC-MCNC: 25 MG/DL (ref 5–40)
WBC # BLD AUTO: 7.7 X10E3/UL (ref 3.4–10.8)

## 2019-06-28 RX ORDER — DICLOFENAC SODIUM 75 MG/1
75 TABLET, DELAYED RELEASE ORAL
Qty: 60 TAB | Refills: 2 | Status: SHIPPED | OUTPATIENT
Start: 2019-06-28 | End: 2020-09-11 | Stop reason: SDUPTHER

## 2019-06-28 NOTE — TELEPHONE ENCOUNTER
I labs look okay. Your gout level- uric acid is at goal.  Allopurinol is at the right dose. I do not think that you are having weekly gout flares. I suspect you are having pain from a different type of arthritis. Try diclofenac anti-inflammatory on the few days a week that you need it.   It will be easier on your body than indomethacin

## 2019-07-01 NOTE — TELEPHONE ENCOUNTER
Contacted pt and verified name and . Informed pt of results, pt verbalized understanding, no questions at this time. He was notified of medication being sent into pharmacy. He voiced understanding.

## 2019-07-08 ENCOUNTER — TELEPHONE (OUTPATIENT)
Dept: FAMILY MEDICINE CLINIC | Age: 64
End: 2019-07-08

## 2019-07-08 NOTE — TELEPHONE ENCOUNTER
It can take a week to get the results back. Today would have been the earliest I would have expected to hear something. Should know something within a week.   If he does not hear from us then call back

## 2019-07-08 NOTE — TELEPHONE ENCOUNTER
Called pt back to let him know that his fit test results are not back. Pt states he send test off 3 days after his last appointment.  Also there are no results on link

## 2019-07-09 LAB — HEMOCCULT STL QL IA: NEGATIVE

## 2019-07-09 NOTE — TELEPHONE ENCOUNTER
Let him know results aren't back yet, it will take about a week and we will call when results are back

## 2019-07-25 RX ORDER — METFORMIN HYDROCHLORIDE 1000 MG/1
1000 TABLET ORAL 2 TIMES DAILY WITH MEALS
Qty: 180 TAB | Refills: 3 | Status: SHIPPED | OUTPATIENT
Start: 2019-07-25 | End: 2019-08-05 | Stop reason: SDUPTHER

## 2019-07-25 NOTE — TELEPHONE ENCOUNTER
Requested Prescriptions     Pending Prescriptions Disp Refills    metFORMIN (GLUCOPHAGE) 1,000 mg tablet 180 Tab 3     Sig: Take 1 Tab by mouth two (2) times daily (with meals). Requested by pharmacy.

## 2019-07-26 RX ORDER — METFORMIN HYDROCHLORIDE 1000 MG/1
1000 TABLET ORAL 2 TIMES DAILY WITH MEALS
Qty: 180 TAB | Refills: 3 | Status: CANCELLED | OUTPATIENT
Start: 2019-07-26

## 2019-07-30 DIAGNOSIS — I10 ESSENTIAL HYPERTENSION: ICD-10-CM

## 2019-07-30 RX ORDER — HYDROCHLOROTHIAZIDE 12.5 MG/1
12.5 TABLET ORAL DAILY
Qty: 30 TAB | Refills: 5 | Status: SHIPPED | OUTPATIENT
Start: 2019-07-30 | End: 2020-02-03 | Stop reason: SDUPTHER

## 2019-08-05 NOTE — TELEPHONE ENCOUNTER
Message copied from [employer]:    N[place]um, [address]             Medication Refill     [facility] not [facility]       Relationship of caller (if not patient): none       Best contact number(s):  [phone]     Name of medication and dosage if known:  Metformin       Is patient out of this medication (yes/no):  yes       Pharmacy name:  [facility]     Pharmacy listed in chart? (yes/no): unknown   Pharmacy phone number:  [phone]       Details to clarify the request:  Per pharmacy refill req faxed 07/31/19  And patient given free dosage and now is completely out of medication.          Luly Sidhu

## 2019-08-06 RX ORDER — METFORMIN HYDROCHLORIDE 1000 MG/1
1000 TABLET ORAL 2 TIMES DAILY WITH MEALS
Qty: 180 TAB | Refills: 3 | Status: SHIPPED | OUTPATIENT
Start: 2019-08-06 | End: 2020-07-20 | Stop reason: SDUPTHER

## 2019-11-21 DIAGNOSIS — I10 ESSENTIAL HYPERTENSION: ICD-10-CM

## 2019-11-22 RX ORDER — LISINOPRIL 10 MG/1
10 TABLET ORAL DAILY
Qty: 90 TAB | Refills: 3 | Status: SHIPPED | OUTPATIENT
Start: 2019-11-22 | End: 2020-11-20 | Stop reason: SDUPTHER

## 2020-01-20 ENCOUNTER — OFFICE VISIT (OUTPATIENT)
Dept: FAMILY MEDICINE CLINIC | Age: 65
End: 2020-01-20

## 2020-01-20 VITALS
WEIGHT: 228 LBS | OXYGEN SATURATION: 96 % | SYSTOLIC BLOOD PRESSURE: 120 MMHG | HEART RATE: 100 BPM | TEMPERATURE: 97.6 F | RESPIRATION RATE: 18 BRPM | HEIGHT: 65 IN | DIASTOLIC BLOOD PRESSURE: 80 MMHG | BODY MASS INDEX: 37.99 KG/M2

## 2020-01-20 DIAGNOSIS — E11.21 TYPE 2 DIABETES WITH NEPHROPATHY (HCC): Primary | ICD-10-CM

## 2020-01-20 DIAGNOSIS — M10.9 GOUT, UNSPECIFIED CAUSE, UNSPECIFIED CHRONICITY, UNSPECIFIED SITE: ICD-10-CM

## 2020-01-20 DIAGNOSIS — I10 ESSENTIAL HYPERTENSION: ICD-10-CM

## 2020-01-20 DIAGNOSIS — L57.8 SUN-DAMAGED SKIN: ICD-10-CM

## 2020-01-20 LAB
ALBUMIN UR QL STRIP: 150 MG/L
CREATININE, URINE POC: 200 MG/DL
HBA1C MFR BLD HPLC: 5.6 %
MICROALBUMIN/CREAT RATIO POC: ABNORMAL MG/G

## 2020-01-20 NOTE — PROGRESS NOTES
1. Have you been to the ER, urgent care clinic since your last visit? Hospitalized since your last visit? No    2. Have you seen or consulted any other health care providers outside of the [address] [place] [address] since your last visit? Include any pap smears or colon screening.  No      Health Maintenance Due   Topic Date Due    DTaP/Tdap/Td series (1 - Tdap) 05/17/1966    Shingrix Vaccine Age 50> (1 of 2) 05/17/2005    Influenza Age 9 to Adult  08/01/2019    FOOT EXAM Q1  09/21/2019    HEMOGLOBIN A1C Q6M  12/27/2019     Eye (Dr. Fernandez Needs) --Last Week

## 2020-01-20 NOTE — PROGRESS NOTES
JOON  Imer Tanner is a 59 y.o. male who presents to follow-up on chronic medical issues. He is really watching his diet and his blood sugars under much better control. Gained a little weight to the holidays. Knows exactly what he did and has gotten back on his diet plan    Is planning to go to the gym starting the next week or so. Expressed concern about being able walk on treadmill. I directed him toward low impact such as elliptical and stationary bike    Has been having some foot pain which he thought was gout but is actually collapsed arch. Requiring diclofenac about 2 days of the week for this    Has not had any thing that he would identify his gout flare    PMHx:  Past Medical History:   Diagnosis Date    Hypertension        Meds:   Current Outpatient Medications   Medication Sig Dispense Refill    lisinopril (PRINIVIL, ZESTRIL) 10 mg tablet Take 1 Tab by mouth daily. 90 Tab 3    metFORMIN (GLUCOPHAGE) 1,000 mg tablet Take 1 Tab by mouth two (2) times daily (with meals). 180 Tab 3    hydroCHLOROthiazide (HYDRODIURIL) 12.5 mg tablet Take 1 Tab by mouth daily. 30 Tab 5    diclofenac EC (VOLTAREN) 75 mg EC tablet Take 1 Tab by mouth two (2) times daily as needed for Pain. 60 Tab 2    indomethacin (INDOCIN) 50 mg capsule Take 1 pill TID PO for duration of gout flare 90 Cap 0    sertraline (ZOLOFT) 25 mg tablet Take 1 Tab by mouth daily. 90 Tab 3    allopurinol (ZYLOPRIM) 100 mg tablet Take 2 Tabs by mouth daily. 60 Tab 11    glucose blood VI test strips (ASCENSIA AUTODISC VI, ONE TOUCH ULTRA TEST VI) strip Check blood sugar twice a day E11.9 200 Strip 11    amLODIPine (NORVASC) 10 mg tablet Take 1 Tab by mouth daily. 90 Tab 3    Lancets misc Check blood glucose fasting and at bed time 200 Each 3    aspirin delayed-release 81 mg tablet Take 1 Tab by mouth daily. (Patient taking differently: Take 81 mg by mouth as needed.) 30 Tab 0    melatonin 3 mg tablet Take 1 Tab by mouth nightly as needed.  27 Tab 0    Nebulizer Accessories kit by Nebulization route every six (6) hours as needed for Cough. 1 Kit 0       Allergies:   No Known Allergies    Smoker:  Social History     Tobacco Use   Smoking Status Former Smoker   Smokeless Tobacco Never Used       ETOH:   Social History     Substance and Sexual Activity   Alcohol Use Not Currently       FH:   Family History   Problem Relation Age of Onset    Cancer Mother     Other Father         Leukemia       ROS:   As listed in HPI. In addition:  Constitutional:   No headache, fever, fatigue, weight loss or weight gain      Cardiac:    No chest pain      Resp:   No cough or shortness of breath      Neuro   No loss of consciousness, dizziness, seizures      Physical Exam:  Blood pressure 120/80, pulse 100, temperature 97.6 °F (36.4 °C), temperature source Oral, resp. rate 18, height 5' 5\" (1.651 m), weight 228 lb (103.4 kg), SpO2 96 %. GEN: No apparent distress. Alert and oriented and responds to all questions appropriately. NEUROLOGIC:  No focal neurologic deficits. Strength and sensation grossly intact. Coordination and gait grossly intact. EXT: Well perfused. No edema. SKIN: No obvious rashes. Lungs clear to auscultation bilaterally  CV regular rate rhythm no murmur       Assessment and Plan      diabetes  A1c 5.6 stable from 5.5%,   Diagnosed 2018 A1c 11.7  Metformin 1000 mg twice daily  Weight is currently stable    Gout uric acid at goal  Allopurinol 20 mg daily    Microalbuminuria  Taking lisinopril    Taking aspirin a day    Hypertension  Blood pressure well controlled  Amlodipine 10 mg  Hydrochlorothiazide 12.5 mg    Sun damaged skin  Sun worshiper as a youth. Some actinic keratosis. Now using sun protection wearing hats. Expressed some concern about skin cancers has several friends with bad skin cancers. Refer for skin exam    6-month follow-up  Full panel labs at that time   FOBT at that time        ICD-10-CM ICD-9-CM    1.  Type 2 diabetes with nephropathy (HCC) E11.21 250.40      583.81    2. Uncontrolled type 2 diabetes mellitus without complication, with long-term current use of insulin (HCC) E11.65 250.02 AMB POC HEMOGLOBIN A1C    Z79.4 V58.67 AMB POC URINE, MICROALBUMIN, SEMIQUANT (3 RESULTS)   3. Sun-damaged skin L57.8 692.79 REFERRAL TO DERMATOLOGY   4. Essential hypertension I10 401.9    5. Gout, unspecified cause, unspecified chronicity, unspecified site M10.9 274.9        AVS given.  Pt expressed understanding of instructions

## 2020-01-21 DIAGNOSIS — I10 ESSENTIAL HYPERTENSION: ICD-10-CM

## 2020-01-21 RX ORDER — AMLODIPINE BESYLATE 10 MG/1
10 TABLET ORAL DAILY
Qty: 90 TAB | Refills: 3 | Status: SHIPPED | OUTPATIENT
Start: 2020-01-21 | End: 2021-01-18 | Stop reason: SDUPTHER

## 2020-02-03 DIAGNOSIS — I10 ESSENTIAL HYPERTENSION: ICD-10-CM

## 2020-02-03 RX ORDER — HYDROCHLOROTHIAZIDE 12.5 MG/1
12.5 TABLET ORAL DAILY
Qty: 90 TAB | Refills: 3 | Status: SHIPPED | OUTPATIENT
Start: 2020-02-03 | End: 2021-01-18 | Stop reason: SDUPTHER

## 2020-02-03 NOTE — TELEPHONE ENCOUNTER
[facility] is calling requesting a refill on med states pt is completely out        Requested Prescriptions     Pending Prescriptions Disp Refills    hydroCHLOROthiazide (HYDRODIURIL) 12.5 mg tablet 30 Tab 5     Sig: Take 1 Tab by mouth daily.

## 2020-03-11 ENCOUNTER — TELEPHONE (OUTPATIENT)
Dept: FAMILY MEDICINE CLINIC | Age: 65
End: 2020-03-11

## 2020-03-11 NOTE — TELEPHONE ENCOUNTER
Pt is calling wanting to speak with Dr Savanah Hall in ref to a cruise he wants to go on and his diabetes precaution he should take    Pt was told he is out of office this week

## 2020-03-11 NOTE — TELEPHONE ENCOUNTER
People with diabetes have a 1 in 10 chance of dying if they come down with coronavirus based on current stats. Not good odds. Take every precaution to avoid exposure.  That may include postponing cruise but there is no telling what things will look like that many months away

## 2020-03-11 NOTE — TELEPHONE ENCOUNTER
Called pt, verified name and . Informed pt that per Dr. Nikolay Sullivan with diabetes have a 1 in 10 chance of dying if they come down with coronavirus based on current stats. Not good odds. Take every precaution to avoid exposure. That may include postponing cruise but there is no telling what things will look like that many months away. Pt stated understanding.

## 2020-03-11 NOTE — TELEPHONE ENCOUNTER
Called pt. He states that his diabetes is well controlled but since he has an underlying health issue, he wants the advise from his PCP about whether or not he should go on a cruise in July. He would like a call back once Dr Mally Knox returns.

## 2020-04-22 DIAGNOSIS — F33.0 MILD EPISODE OF RECURRENT MAJOR DEPRESSIVE DISORDER (HCC): ICD-10-CM

## 2020-04-22 DIAGNOSIS — F32.A DEPRESSION, UNSPECIFIED DEPRESSION TYPE: ICD-10-CM

## 2020-04-22 DIAGNOSIS — Z87.39 HISTORY OF GOUT: ICD-10-CM

## 2020-04-22 RX ORDER — ALLOPURINOL 100 MG/1
200 TABLET ORAL DAILY
Qty: 60 TAB | Refills: 11 | Status: SHIPPED | OUTPATIENT
Start: 2020-04-22 | End: 2021-04-23 | Stop reason: SDUPTHER

## 2020-04-22 RX ORDER — SERTRALINE HYDROCHLORIDE 25 MG/1
25 TABLET, FILM COATED ORAL DAILY
Qty: 90 TAB | Refills: 3 | Status: SHIPPED | OUTPATIENT
Start: 2020-04-22 | End: 2021-04-23 | Stop reason: SDUPTHER

## 2020-04-22 NOTE — TELEPHONE ENCOUNTER
[facility] is requesting a refill on med    Requested Prescriptions     Pending Prescriptions Disp Refills    sertraline (ZOLOFT) 25 mg tablet 90 Tab 3     Sig: Take 1 Tab by mouth daily.

## 2020-04-22 NOTE — TELEPHONE ENCOUNTER
[facility] is requesting a refill on med    Requested Prescriptions     Pending Prescriptions Disp Refills    allopurinoL (ZYLOPRIM) 100 mg tablet 60 Tab 11     Sig: Take 2 Tabs by mouth daily.

## 2020-06-09 ENCOUNTER — VIRTUAL VISIT (OUTPATIENT)
Dept: FAMILY MEDICINE CLINIC | Age: 65
End: 2020-06-09

## 2020-06-09 DIAGNOSIS — I10 ESSENTIAL HYPERTENSION: ICD-10-CM

## 2020-06-09 DIAGNOSIS — M10.9 GOUT, UNSPECIFIED CAUSE, UNSPECIFIED CHRONICITY, UNSPECIFIED SITE: ICD-10-CM

## 2020-06-09 DIAGNOSIS — E11.21 TYPE 2 DIABETES WITH NEPHROPATHY (HCC): Primary | ICD-10-CM

## 2020-06-09 NOTE — PROGRESS NOTES
HPI  Isac Rojo is a 72 y.o. male who presents to follow-up on chronic medical issues. He is really watching his diet and his blood sugars under much better control. Fasting sugar 85100    Plans to go to the gym derailed by pandemic. Plans to get a mini exercise bike that he can use on the couch    Has been having some foot pain which he thought was gout but is actually collapsed arch. Requiring diclofenac about 2 days of the week for this. Still having a problem with this when he wears flops    Has not had any thing that he would identify his gout flare    PMHx:  Past Medical History:   Diagnosis Date    Hypertension        Meds:   Current Outpatient Medications   Medication Sig Dispense Refill    glucose blood VI test strips (OneTouch Ultra Blue Test Strip) strip Please check blood sugar twice daily, E11.9 200 Strip 11    allopurinoL (ZYLOPRIM) 100 mg tablet Take 2 Tabs by mouth daily. 60 Tab 11    sertraline (ZOLOFT) 25 mg tablet Take 1 Tab by mouth daily. 90 Tab 3    hydroCHLOROthiazide (HYDRODIURIL) 12.5 mg tablet Take 1 Tab by mouth daily. 90 Tab 3    amLODIPine (NORVASC) 10 mg tablet Take 1 Tab by mouth daily. 90 Tab 3    lisinopril (PRINIVIL, ZESTRIL) 10 mg tablet Take 1 Tab by mouth daily. 90 Tab 3    metFORMIN (GLUCOPHAGE) 1,000 mg tablet Take 1 Tab by mouth two (2) times daily (with meals). 180 Tab 3    diclofenac EC (VOLTAREN) 75 mg EC tablet Take 1 Tab by mouth two (2) times daily as needed for Pain. 60 Tab 2    indomethacin (INDOCIN) 50 mg capsule Take 1 pill TID PO for duration of gout flare 90 Cap 0    Lancets misc Check blood glucose fasting and at bed time 200 Each 3    aspirin delayed-release 81 mg tablet Take 1 Tab by mouth daily. (Patient taking differently: Take 81 mg by mouth as needed.) 30 Tab 0    melatonin 3 mg tablet Take 1 Tab by mouth nightly as needed. 30 Tab 0    Nebulizer Accessories kit by Nebulization route every six (6) hours as needed for Cough.  1 Kit 0 Allergies:   No Known Allergies    Smoker:  Social History     Tobacco Use   Smoking Status Former Smoker   Smokeless Tobacco Never Used       ETOH:   Social History     Substance and Sexual Activity   Alcohol Use Not Currently       FH:   Family History   Problem Relation Age of Onset    Cancer Mother     Other Father         Leukemia       ROS:   As listed in HPI. In addition:  Constitutional:   No headache, fever, fatigue, weight loss or weight gain      Cardiac:    No chest pain      Resp:   No cough or shortness of breath      Neuro   No loss of consciousness, dizziness, seizures      Physical Exam:  There were no vitals taken for this visit. GEN: No apparent distress. Alert and oriented and responds to all questions appropriately. NEUROLOGIC:  No focal neurologic deficits. Strength and sensation grossly intact. Coordination and gait grossly intact. EXT: Well perfused. No edema. SKIN: No obvious rashes. Assessment and Plan      diabetes  A1c 5.6 stable from 5.5%,   Diagnosed 2018 A1c 11.7  Metformin 1000 mg twice daily  Weight is currently stable    Gout   uric acid at goal  Allopurinol 20 mg daily    Microalbuminuria  Taking lisinopril    Taking aspirin a day    Hypertension  Blood pressure well controlled  Amlodipine 10 mg  Hydrochlorothiazide 12.5 mg    Not on  statin  Cholesterol is excellent    Sun damaged skin  Sun worshiper as a youth. Some actinic keratosis. Now using sun protection wearing hats. Expressed some concern about skin cancers has several friends with bad skin cancers. Refer for skin exam    6-month follow-up  Due for labs. Would prefer to come to office for labs  CBC CMP lipid A1c uric acid  FOBT at that time        ICD-10-CM ICD-9-CM    1. Type 2 diabetes with nephropathy (HCC) E11.21 250.40      583.81    2. Gout, unspecified cause, unspecified chronicity, unspecified site M10.9 274.9    3. Essential hypertension I10 401.9        AVS given.  Pt expressed understanding of instructions  THIS VISIT WAS COMPLETED VIRTUALLY USING DOXY. ME    Due to this being a TeleHealth evaluation, many elements of the physical examination are unable to be assessed. Pursuant to the emergency declaration under the [address] [place] [address], [facility] waiver authority and the [email or web address] and [employer] Act, this Virtual  Visit was conducted, with patient's consent, to reduce the patient's risk of exposure to COVID-19 and provide continuity of care for an established patient. Services were provided through a video synchronous discussion virtually to substitute for in-person clinic visit.

## 2020-06-09 NOTE — PROGRESS NOTES
Chief Complaint   Patient presents with    Diabetes     6 month follow-up      Health Maintenance reviewed     1. Have you been to the ER, urgent care clinic since your last visit? Hospitalized since your last visit? No     2. Have you seen or consulted any other health care providers outside of the [address] [place] [address] since your last visit? Include any pap smears or colon screening.   No

## 2020-07-20 DIAGNOSIS — E11.8 CONTROLLED DIABETES MELLITUS TYPE 2 WITH COMPLICATIONS, UNSPECIFIED WHETHER LONG TERM INSULIN USE (HCC): ICD-10-CM

## 2020-07-20 RX ORDER — METFORMIN HYDROCHLORIDE 1000 MG/1
1000 TABLET ORAL 2 TIMES DAILY WITH MEALS
Qty: 180 TAB | Refills: 3 | Status: SHIPPED | OUTPATIENT
Start: 2020-07-20 | End: 2020-07-21 | Stop reason: SDUPTHER

## 2020-07-20 NOTE — TELEPHONE ENCOUNTER
[facility] is requesting a refill on med    Requested Prescriptions     Pending Prescriptions Disp Refills    metFORMIN (GLUCOPHAGE) 1,000 mg tablet 180 Tab 3     Sig: Take 1 Tab by mouth two (2) times daily (with meals).

## 2020-07-21 DIAGNOSIS — E11.8 CONTROLLED DIABETES MELLITUS TYPE 2 WITH COMPLICATIONS, UNSPECIFIED WHETHER LONG TERM INSULIN USE (HCC): ICD-10-CM

## 2020-07-21 NOTE — TELEPHONE ENCOUNTER
Requested Prescriptions     Pending Prescriptions Disp Refills    metFORMIN (GLUCOPHAGE) 1,000 mg tablet 180 Tab 3     Sig: Take 1 Tab by mouth two (2) times daily (with meals).

## 2020-07-22 RX ORDER — METFORMIN HYDROCHLORIDE 1000 MG/1
1000 TABLET ORAL 2 TIMES DAILY WITH MEALS
Qty: 180 TAB | Refills: 0 | Status: SHIPPED | OUTPATIENT
Start: 2020-07-22 | End: 2021-07-07 | Stop reason: SDUPTHER

## 2020-09-11 RX ORDER — DICLOFENAC SODIUM 75 MG/1
75 TABLET, DELAYED RELEASE ORAL
Qty: 60 TAB | Refills: 5 | Status: SHIPPED | OUTPATIENT
Start: 2020-09-11

## 2020-09-11 NOTE — TELEPHONE ENCOUNTER
Requested Prescriptions     Pending Prescriptions Disp Refills    diclofenac EC (VOLTAREN) 75 mg EC tablet 60 Tab 2     Sig: Take 1 Tab by mouth two (2) times daily as needed for Pain.

## 2020-09-11 NOTE — TELEPHONE ENCOUNTER
Requested Prescriptions     Pending Prescriptions Disp Refills    diclofenac EC (VOLTAREN) 75 mg EC tablet 60 Tab 2     Sig: Take 1 Tab by mouth two (2) times daily as needed for Pain.      Last Office Visit:  [phone]

## 2020-10-02 ENCOUNTER — OFFICE VISIT (OUTPATIENT)
Dept: FAMILY MEDICINE CLINIC | Age: 65
End: 2020-10-02
Payer: COMMERCIAL

## 2020-10-02 VITALS
HEIGHT: 65 IN | RESPIRATION RATE: 18 BRPM | OXYGEN SATURATION: 94 % | TEMPERATURE: 97 F | BODY MASS INDEX: 38.59 KG/M2 | SYSTOLIC BLOOD PRESSURE: 122 MMHG | DIASTOLIC BLOOD PRESSURE: 81 MMHG | WEIGHT: 231.6 LBS | HEART RATE: 91 BPM

## 2020-10-02 DIAGNOSIS — Z12.11 COLON CANCER SCREENING: ICD-10-CM

## 2020-10-02 DIAGNOSIS — E11.21 TYPE 2 DIABETES WITH NEPHROPATHY (HCC): Primary | ICD-10-CM

## 2020-10-02 DIAGNOSIS — M10.9 GOUT, UNSPECIFIED CAUSE, UNSPECIFIED CHRONICITY, UNSPECIFIED SITE: ICD-10-CM

## 2020-10-02 DIAGNOSIS — I10 ESSENTIAL HYPERTENSION: ICD-10-CM

## 2020-10-02 DIAGNOSIS — E66.01 OBESITY, MORBID (HCC): ICD-10-CM

## 2020-10-02 PROCEDURE — 99214 OFFICE O/P EST MOD 30 MIN: CPT | Performed by: FAMILY MEDICINE

## 2020-10-02 NOTE — PROGRESS NOTES
HPI  Leidy Miner is a 72 y.o. male who presents to follow-up on chronic medical issues. He is really watching his diet and his blood sugars under much better control.       Fasting sugar 85100     Plans to go to the gym derailed by pandemic. Plans to get a mini exercise bike that he can use on the couch    Had what sounds like a gout flare in August. Lasted a week. Maybe 3 gout flares in the last year. Usually due to dietary indiscretion. Taking allopurinol without issue. Takes diclofenac when he gets a flare      PMHx:  Past Medical History:   Diagnosis Date    Hypertension        Meds:   Current Outpatient Medications   Medication Sig Dispense Refill    diclofenac EC (VOLTAREN) 75 mg EC tablet Take 1 Tab by mouth two (2) times daily as needed for Pain. 60 Tab 5    metFORMIN (GLUCOPHAGE) 1,000 mg tablet Take 1 Tab by mouth two (2) times daily (with meals). 180 Tab 0    glucose blood VI test strips (OneTouch Ultra Blue Test Strip) strip Please check blood sugar twice daily, E11.9 200 Strip 11    allopurinoL (ZYLOPRIM) 100 mg tablet Take 2 Tabs by mouth daily. 60 Tab 11    sertraline (ZOLOFT) 25 mg tablet Take 1 Tab by mouth daily. 90 Tab 3    hydroCHLOROthiazide (HYDRODIURIL) 12.5 mg tablet Take 1 Tab by mouth daily. 90 Tab 3    amLODIPine (NORVASC) 10 mg tablet Take 1 Tab by mouth daily. 90 Tab 3    lisinopril (PRINIVIL, ZESTRIL) 10 mg tablet Take 1 Tab by mouth daily. 90 Tab 3    indomethacin (INDOCIN) 50 mg capsule Take 1 pill TID PO for duration of gout flare 90 Cap 0    Lancets misc Check blood glucose fasting and at bed time 200 Each 3    aspirin delayed-release 81 mg tablet Take 1 Tab by mouth daily. (Patient taking differently: Take 81 mg by mouth as needed.) 30 Tab 0    melatonin 3 mg tablet Take 1 Tab by mouth nightly as needed. 30 Tab 0    Nebulizer Accessories kit by Nebulization route every six (6) hours as needed for Cough.  1 Kit 0       Allergies:   No Known Allergies    Smoker:  Social History     Tobacco Use   Smoking Status Former Smoker   Smokeless Tobacco Never Used       ETOH:   Social History     Substance and Sexual Activity   Alcohol Use Not Currently       FH:   Family History   Problem Relation Age of Onset    Cancer Mother     Other Father         Leukemia       ROS:   As listed in HPI. In addition:  Constitutional:   No headache, fever, fatigue, weight loss or weight gain      Cardiac:    No chest pain      Resp:   No cough or shortness of breath      Neuro   No loss of consciousness, dizziness, seizures      Physical Exam:  Blood pressure 122/81, pulse 91, temperature 97 °F (36.1 °C), temperature source Temporal, resp. rate 18, height 5' 5\" (1.651 m), weight 231 lb 9.6 oz (105.1 kg), SpO2 94 %. GEN: No apparent distress. Alert and oriented and responds to all questions appropriately. NEUROLOGIC:  No focal neurologic deficits. Strength and sensation grossly intact. Coordination and gait grossly intact. EXT: Well perfused. No edema. SKIN: No obvious rashes. Lungs clear to auscultation bilaterally  CV regular rate       Assessment and Plan      diabetes  A1c today  Previous 5.6 stable from 5.5%,   Diagnosed 2018 A1c 11.7  Metformin 1000 mg twice daily  Weight is currently stable     Gout   uric acid   Allopurinol 200 mg daily     Microalbuminuria  Taking lisinopril     Taking aspirin a day     Hypertension  Blood pressure well controlled  Amlodipine 10 mg  Hydrochlorothiazide 12.5 mg     Not on  statin  Cholesterol is excellent      ICD-10-CM ICD-9-CM    1. Type 2 diabetes with nephropathy (HCC)  E11.21 250.40 HEMOGLOBIN A1C WITH EAG     583.81    2. Essential hypertension  I10 401.9 LIPID PANEL      CBC WITH AUTOMATED DIFF      METABOLIC PANEL, COMPREHENSIVE   3. Obesity, morbid ([facility] [address]  E66.01 278.01    4. Gout, unspecified cause, unspecified chronicity, unspecified site  M10.9 274.9 URIC ACID   5.  Colon cancer screening  Z12.11 V76.51 OCCULT BLOOD IMMUNOASSAY,DIAGNOSTIC       AVS given.  Pt expressed understanding of instructions

## 2020-10-03 LAB
ALBUMIN SERPL-MCNC: 4.6 G/DL (ref 3.8–4.8)
ALBUMIN/GLOB SERPL: 2 {RATIO} (ref 1.2–2.2)
ALP SERPL-CCNC: 72 IU/L (ref 39–117)
ALT SERPL-CCNC: 29 IU/L (ref 0–44)
AST SERPL-CCNC: 19 IU/L (ref 0–40)
BASOPHILS # BLD AUTO: 0.1 X10E3/UL (ref 0–0.2)
BASOPHILS NFR BLD AUTO: 1 %
BILIRUB SERPL-MCNC: 0.9 MG/DL (ref 0–1.2)
BUN SERPL-MCNC: 29 MG/DL (ref 8–27)
BUN/CREAT SERPL: 17 (ref 10–24)
CALCIUM SERPL-MCNC: 9.7 MG/DL (ref 8.6–10.2)
CHLORIDE SERPL-SCNC: 101 MMOL/L (ref 96–106)
CHOLEST SERPL-MCNC: 168 MG/DL (ref 100–199)
CO2 SERPL-SCNC: 26 MMOL/L (ref 20–29)
CREAT SERPL-MCNC: 1.67 MG/DL (ref 0.76–1.27)
EOSINOPHIL # BLD AUTO: 0.3 X10E3/UL (ref 0–0.4)
EOSINOPHIL NFR BLD AUTO: 4 %
ERYTHROCYTE [DISTWIDTH] IN BLOOD BY AUTOMATED COUNT: 13.9 % (ref 11.6–15.4)
EST. AVERAGE GLUCOSE BLD GHB EST-MCNC: 126 MG/DL
GLOBULIN SER CALC-MCNC: 2.3 G/DL (ref 1.5–4.5)
GLUCOSE SERPL-MCNC: 87 MG/DL (ref 65–99)
HBA1C MFR BLD: 6 % (ref 4.8–5.6)
HCT VFR BLD AUTO: 42.3 % (ref 37.5–51)
HDLC SERPL-MCNC: 34 MG/DL
HGB BLD-MCNC: 14.2 G/DL (ref 13–17.7)
IMM GRANULOCYTES # BLD AUTO: 0.1 X10E3/UL (ref 0–0.1)
IMM GRANULOCYTES NFR BLD AUTO: 1 %
INTERPRETATION, 910389: NORMAL
INTERPRETATION: NORMAL
LDLC SERPL CALC-MCNC: 98 MG/DL (ref 0–99)
LYMPHOCYTES # BLD AUTO: 1.4 X10E3/UL (ref 0.7–3.1)
LYMPHOCYTES NFR BLD AUTO: 17 %
Lab: NORMAL
MCH RBC QN AUTO: 29.2 PG (ref 26.6–33)
MCHC RBC AUTO-ENTMCNC: 33.6 G/DL (ref 31.5–35.7)
MCV RBC AUTO: 87 FL (ref 79–97)
MONOCYTES # BLD AUTO: 0.9 X10E3/UL (ref 0.1–0.9)
MONOCYTES NFR BLD AUTO: 11 %
NEUTROPHILS # BLD AUTO: 5.3 X10E3/UL (ref 1.4–7)
NEUTROPHILS NFR BLD AUTO: 66 %
PDF IMAGE, 910387: NORMAL
PLATELET # BLD AUTO: 206 X10E3/UL (ref 150–450)
POTASSIUM SERPL-SCNC: 4.6 MMOL/L (ref 3.5–5.2)
PROT SERPL-MCNC: 6.9 G/DL (ref 6–8.5)
RBC # BLD AUTO: 4.86 X10E6/UL (ref 4.14–5.8)
SODIUM SERPL-SCNC: 141 MMOL/L (ref 134–144)
TRIGL SERPL-MCNC: 210 MG/DL (ref 0–149)
URATE SERPL-MCNC: 6.3 MG/DL (ref 3.7–8.6)
VLDLC SERPL CALC-MCNC: 36 MG/DL (ref 5–40)
WBC # BLD AUTO: 8.1 X10E3/UL (ref 3.4–10.8)

## 2020-10-05 ENCOUNTER — TELEPHONE (OUTPATIENT)
Dept: FAMILY MEDICINE CLINIC | Age: 65
End: 2020-10-05

## 2020-10-05 NOTE — TELEPHONE ENCOUNTER
Labs reviewed    A1c is 6.0. A little higher than the last time we checked but still well controlled. Kidney function is a little reduced compared to previous. Need to avoid medicines that could potentially damage the kidneys. These include NSAIDs like ibuprofen diclofenac and indomethacin. He should not take these on a daily basis.   Once in a while is okay

## 2020-10-15 LAB — HEMOCCULT STL QL IA: NEGATIVE

## 2020-11-20 DIAGNOSIS — I10 ESSENTIAL HYPERTENSION: ICD-10-CM

## 2020-11-20 RX ORDER — LISINOPRIL 10 MG/1
10 TABLET ORAL DAILY
Qty: 90 TAB | Refills: 3 | Status: SHIPPED | OUTPATIENT
Start: 2020-11-20 | End: 2021-11-16 | Stop reason: SDUPTHER

## 2020-12-16 ENCOUNTER — TELEPHONE (OUTPATIENT)
Dept: FAMILY MEDICINE CLINIC | Age: 65
End: 2020-12-16

## 2020-12-16 ENCOUNTER — TELEPHONE (OUTPATIENT)
Dept: INTERNAL MEDICINE CLINIC | Age: 65
End: 2020-12-16

## 2020-12-16 NOTE — TELEPHONE ENCOUNTER
Called [facility].  Spoke with Edi Womack in regards to pt's billing inquiry. Informed her that pt states that he has received a collections from them for $95 for an unpaid stool occult test.  She had informed me that it was before insurance info was collected and that the claim has been submitted to insurance on 12/07/2020 for processing. She states that it would take 30-45 days for it to be processed. She says to inform the pt to disregard billing while insurance is pending. Notified Kaylin who will notify the patient.

## 2020-12-16 NOTE — TELEPHONE ENCOUNTER
I called patient. He is having trouble with a lab bill. We sent in a lab req with on Dx and he is receiving a bill. I have contacted [employer] He will discuss with Dr Edita Niño the correct Dx that should be attached to this order. Then Titi Coppola will contact lab huy billing and get the Dx added to the [facility] 10/13/2020.

## 2021-01-08 ENCOUNTER — VIRTUAL VISIT (OUTPATIENT)
Dept: FAMILY MEDICINE CLINIC | Age: 66
End: 2021-01-08
Payer: COMMERCIAL
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
## 2021-01-08 DIAGNOSIS — S69.92XA INJURY OF LEFT HAND, INITIAL ENCOUNTER: Primary | ICD-10-CM

## 2021-01-08 PROCEDURE — 99213 OFFICE O/P EST LOW 20 MIN: CPT | Performed by: FAMILY MEDICINE

## 2021-01-08 NOTE — PROGRESS NOTES
THIS VISIT WAS COMPLETED VIRTUALLY USING [employer]. MARGIE DUPREE  Rose Cota is a 72 y.o. male who presents with concern about injuring his hand after a fall. 10 days ago he tripped at home and fell into a cabinet. Broke his fall with his left hand and immediately after the fall had pain and swelling at the thenar eminence. He applied ice and has noticed steady improvement over the last several days but is worried because there is still some swelling and if he puts a lot of pressure on the thenar eminence he can elicit some pain for instance when he lifts a lot of groceries or his 30 pound dog. Movement of the thumb hand wrist is nonpainful and there is a complete range of motion    PMHx:  Past Medical History:   Diagnosis Date    Hypertension        Meds:   Current Outpatient Medications   Medication Sig Dispense Refill    lisinopriL (PRINIVIL, ZESTRIL) 10 mg tablet Take 1 Tab by mouth daily. 90 Tab 3    diclofenac EC (VOLTAREN) 75 mg EC tablet Take 1 Tab by mouth two (2) times daily as needed for Pain. 60 Tab 5    metFORMIN (GLUCOPHAGE) 1,000 mg tablet Take 1 Tab by mouth two (2) times daily (with meals). 180 Tab 0    glucose blood VI test strips (OneTouch Ultra Blue Test Strip) strip Please check blood sugar twice daily, E11.9 200 Strip 11    allopurinoL (ZYLOPRIM) 100 mg tablet Take 2 Tabs by mouth daily. 60 Tab 11    sertraline (ZOLOFT) 25 mg tablet Take 1 Tab by mouth daily. 90 Tab 3    hydroCHLOROthiazide (HYDRODIURIL) 12.5 mg tablet Take 1 Tab by mouth daily. 90 Tab 3    amLODIPine (NORVASC) 10 mg tablet Take 1 Tab by mouth daily. 90 Tab 3    Lancets misc Check blood glucose fasting and at bed time 200 Each 3    aspirin delayed-release 81 mg tablet Take 1 Tab by mouth daily. (Patient taking differently: Take 81 mg by mouth as needed.) 30 Tab 0    melatonin 3 mg tablet Take 1 Tab by mouth nightly as needed.  30 Tab 0    Nebulizer Accessories kit by Nebulization route every six (6) hours as needed for Cough. 1 Kit 0    indomethacin (INDOCIN) 50 mg capsule Take 1 pill TID PO for duration of gout flare 90 Cap 0       Allergies:   No Known Allergies    Smoker:  Social History     Tobacco Use   Smoking Status Former Smoker   Smokeless Tobacco Never Used       ETOH:   Social History     Substance and Sexual Activity   Alcohol Use Not Currently       FH:   Family History   Problem Relation Age of Onset    Cancer Mother     Other Father         Leukemia       ROS:   As listed in HPI. In addition:  Constitutional:   No headache, fever, fatigue, weight loss or weight gain      Cardiac:    No chest pain      Resp:   No cough or shortness of breath      Neuro   No loss of consciousness, dizziness, seizures      Physical Exam:  There were no vitals taken for this visit. GEN: No apparent distress. Alert and oriented and responds to all questions appropriately. NEUROLOGIC:  No focal neurologic deficits. Coordination and gait grossly intact. EXT: Well perfused. No edema. SKIN: No obvious rashes. Had patient move his wrist in all directions. Thumb in all directions. Squeeze the thumb in his fist, good thumb opposition to the pinky. Strong     Due to this being a TeleHealth evaluation, many elements of the physical examination are unable to be assessed. Assessment and Plan     Provided reassurance that the hand injury appears to have been a bruise of the thenar eminence and that any small amount of swelling remains is the residual bruise. The joints ligaments and tendons all appear to be intact as he can move freely without pain      ICD-10-CM ICD-9-CM    1. Injury of left hand, initial encounter  S69. 92XA 959.4    2. Fall, initial encounter  [address] Sammi العراقي C978.9          Pursuant to the emergency declaration under the [facility], [facility] waiver authority and the [employer] Act, this Virtual  Visit was conducted, with patient's consent, to reduce the patient's risk of exposure to COVID-19 and provide continuity of care for an established patient. Services were provided through a video synchronous discussion virtually to substitute for in-person clinic visit.

## 2021-01-08 NOTE — PROGRESS NOTES
1. Have you been to the ER, urgent care clinic since your last visit? Hospitalized since your last visit? No    2. Have you seen or consulted any other health care providers outside of the [address] [place] [address] since your last visit? Include any pap smears or colon screening. No    Health Maintenance Due   Topic Date Due    DTaP/Tdap/Td series (1 - Tdap) 05/17/1976    Shingrix Vaccine Age 50> (2 of 2) 08/22/2018    Foot Exam Q1  09/21/2019    MICROALBUMIN Q1  01/20/2021     Do you have an [address] in place in the event that you have a healthcare crisis that could impact your decision making as it pertains to your health? NO    Would you like information about Advance Care Planning? NO    Information given.  NO

## 2021-01-18 DIAGNOSIS — I10 ESSENTIAL HYPERTENSION: ICD-10-CM

## 2021-01-18 NOTE — TELEPHONE ENCOUNTER
Requested Prescriptions     Pending Prescriptions Disp Refills    hydroCHLOROthiazide (HYDRODIURIL) 12.5 mg tablet 90 Tab 3     Sig: Take 1 Tab by mouth daily.  amLODIPine (NORVASC) 10 mg tablet 90 Tab 3     Sig: Take 1 Tab by mouth daily.

## 2021-01-19 RX ORDER — AMLODIPINE BESYLATE 10 MG/1
10 TABLET ORAL DAILY
Qty: 90 TAB | Refills: 3 | Status: SHIPPED | OUTPATIENT
Start: 2021-01-19 | End: 2022-01-17 | Stop reason: SDUPTHER

## 2021-01-19 RX ORDER — HYDROCHLOROTHIAZIDE 12.5 MG/1
12.5 TABLET ORAL DAILY
Qty: 90 TAB | Refills: 3 | Status: SHIPPED | OUTPATIENT
Start: 2021-01-19 | End: 2022-01-17 | Stop reason: SDUPTHER

## 2021-03-30 ENCOUNTER — VIRTUAL VISIT (OUTPATIENT)
Dept: FAMILY MEDICINE CLINIC | Age: 66
End: 2021-03-30
Payer: COMMERCIAL

## 2021-03-30 DIAGNOSIS — E66.01 OBESITY, MORBID (HCC): ICD-10-CM

## 2021-03-30 DIAGNOSIS — S63.502D SPRAIN OF LEFT WRIST, SUBSEQUENT ENCOUNTER: Primary | ICD-10-CM

## 2021-03-30 DIAGNOSIS — E11.21 TYPE 2 DIABETES WITH NEPHROPATHY (HCC): ICD-10-CM

## 2021-03-30 PROCEDURE — 99214 OFFICE O/P EST MOD 30 MIN: CPT | Performed by: FAMILY MEDICINE

## 2021-03-30 NOTE — PATIENT INSTRUCTIONS
Thumb Arthritis: Exercises Introduction Here are some examples of exercises for you to try. The exercises may be suggested for a condition or for rehabilitation. Start each exercise slowly. Ease off the exercises if you start to have pain. You will be told when to start these exercises and which ones will work best for you. How to do the exercises Thumb IP flexion 1. Place your forearm and hand on a table with your affected thumb pointing up. 2. With your other hand, hold your thumb steady just below the joint nearest your thumbnail. 3. Bend the tip of your thumb downward, then straighten it. 4. Repeat 8 to 12 times. 5. Switch hands and repeat steps 1 through 4, even if only one thumb is sore. Thumb MP flexion 1. Place your forearm and hand on a table with your affected thumb pointing up. 2. With your other hand, hold the base of your thumb and palm steady. 3. Bend your thumb downward where it meets your palm, then straighten it. 4. Repeat 8 to 12 times. 5. Switch hands and repeat steps 1 through 4, even if only one thumb is sore. Thumb opposition 1. With your affected hand, point your fingers and thumb straight up. Your wrist should be relaxed, following the line of your fingers and thumb. 2. Touch your affected thumb to each finger, one finger at a time. This will look like an \"okay\" sign, but try to keep your other fingers straight and pointing upward as much as you can. 3. Repeat 8 to 12 times. 4. Switch hands and repeat steps 1 through 3, even if only one thumb is sore. Follow-up care is a key part of your treatment and safety. Be sure to make and go to all appointments, and call your doctor if you are having problems. It's also a good idea to know your test results and keep a list of the medicines you take. Where can you learn more? Go to http://www.gray.com/ Enter U967 in the search box to learn more about \"Thumb Arthritis: Exercises. \" Current as of: March 2, 2020               Content Version: 12.6 © 3926-4552 [employer]. Care instructions adapted under license by [employer] (which disclaims liability or warranty for this information). If you have questions about a medical condition or this instruction, always ask your healthcare professional. [address] any warranty or liability for your use of this information. Wrist Sprain: Rehab Exercises Introduction Here are some examples of exercises for you to try. The exercises may be suggested for a condition or for rehabilitation. Start each exercise slowly. Ease off the exercises if you start to have pain. You will be told when to start these exercises and which ones will work best for you. How to do the exercises Resisted wrist extension 6. Sit leaning forward with your legs slightly spread. Then place your forearm on your thigh with your affected hand and wrist in front of your knee. 7. Grasp one end of an exercise band with your palm down. Step on the other end. 
8. Slowly bend your wrist upward for a count of 2. Then lower your wrist slowly to a count of 5. 
9. Repeat 8 to 12 times. Resisted wrist flexion 6. Sit leaning forward with your legs slightly spread. Then place your forearm on your thigh with your affected hand and wrist in front of your knee. 7. Grasp one end of an exercise band with your palm up. Step on the other end. 
8. Slowly bend your wrist upward for a count of 2. Then lower your wrist slowly to a count of 5. 
9. Repeat 8 to 12 times. Resisted radial deviation 5. Sit leaning forward with your legs slightly spread. Then place your forearm on your thigh with your affected hand and wrist in front of your knee. 6. Grasp one end of an exercise band with your hand facing toward your other thigh. Step on the other end. 
7. Slowly bend your wrist upward for a count of 2. Then lower your wrist slowly to a count of 5. 8. Repeat 8 to 12 times. Resisted ulnar deviation 1. Sit leaning forward with your legs slightly spread. Then place your forearm on your thigh with your affected hand and wrist by the inside of your knee. 2. Grasp one end of an exercise band with your palm down. Step on the other end with the foot opposite the hand holding the band. 3. Slowly bend your wrist outward and toward your knee for a count of 2. Then slowly move your wrist back to the starting position to a count of 5. 
4. Repeat 8 to 12 times. Resisted forearm pronation 1. Sit leaning forward with your legs slightly spread. Then place your forearm on your thigh with your affected hand and wrist in front of your knee. 2. Grasp one end of an exercise band with your palm up. Step on the other end. 3. Keeping your wrist straight, roll your palm inward toward your thigh for a count of 2. Then slowly move your wrist back to the starting position to a count of 5. 
4. Repeat 8 to 12 times. Resisted supination 1. Sit leaning forward with your legs slightly spread. Then place your forearm on your thigh with your affected hand and wrist in front of your knee. 2. Grasp one end of an exercise band with your palm down. Step on the other end. 3. Keeping your wrist straight, roll your palm outward and away from your thigh for a count of 2. Then slowly move your wrist back to the starting position to a count of 5. 
4. Repeat 8 to 12 times. Follow-up care is a key part of your treatment and safety. Be sure to make and go to all appointments, and call your doctor if you are having problems. It's also a good idea to know your test results and keep a list of the medicines you take. Where can you learn more? Go to http://www.gray.com/ Enter S110 in the search box to learn more about \"Wrist Sprain: Rehab Exercises. \" Current as of: March 2, 2020               Content Version: 12.6 © 2095-2468 [employer], [facility].   
Care instructions adapted under license by [employer] (which disclaims liability or warranty for this information). If you have questions about a medical condition or this instruction, always ask your healthcare professional. [address] any warranty or liability for your use of this information.

## 2021-03-30 NOTE — PROGRESS NOTES
THIS VISIT WAS COMPLETED VIRTUALLY USING [employer]. MARGIE DUPREE  Han Colin is a 72 y.o. male who presents to follow-up on left hand pain. I saw him for this complaint in January. He had fallen on outstretched hand and had some pain at the thenar eminence. He had full mobility. Was painless. Was is little worried because it was swollen. Evidently since then it has remained swollen and has been irritating him when opening jars, carrying groceries. Did some gardening over the weekend and it became more swollen and tender. He points over the thenar eminence and the route of the flexor tendon when describing this problem. Is not really pointing at the joints. Is also had an interesting problem his other fingers in the last week or so that he describes as \"trigger hand\". Has a sensation that his hand is getting caught when he makes a fist and tries to extend his fingers. His perception is that they are getting caught at the [facility]. He also did tell me about his diet. Is doing a sort of keto diet. Is gone from 229 pounds down to 201 pounds. His goal is 180. His blood sugar has been 80100 on 1000 mg Metformin. He is also noticed an improvement in his gout    PMHx:  Past Medical History:   Diagnosis Date    Hypertension        Meds:   Current Outpatient Medications   Medication Sig Dispense Refill    APPLE CIDER VINEGAR PO Take  by mouth daily.  hydroCHLOROthiazide (HYDRODIURIL) 12.5 mg tablet Take 1 Tab by mouth daily. 90 Tab 3    amLODIPine (NORVASC) 10 mg tablet Take 1 Tab by mouth daily. 90 Tab 3    lisinopriL (PRINIVIL, ZESTRIL) 10 mg tablet Take 1 Tab by mouth daily. 90 Tab 3    diclofenac EC (VOLTAREN) 75 mg EC tablet Take 1 Tab by mouth two (2) times daily as needed for Pain. 60 Tab 5    metFORMIN (GLUCOPHAGE) 1,000 mg tablet Take 1 Tab by mouth two (2) times daily (with meals).  180 Tab 0    glucose blood VI test strips (OneTouch Ultra Blue Test Strip) strip Please check blood sugar twice daily, E11.9 200 Strip 11    allopurinoL (ZYLOPRIM) 100 mg tablet Take 2 Tabs by mouth daily. 60 Tab 11    sertraline (ZOLOFT) 25 mg tablet Take 1 Tab by mouth daily. 90 Tab 3    indomethacin (INDOCIN) 50 mg capsule Take 1 pill TID PO for duration of gout flare 90 Cap 0    Lancets misc Check blood glucose fasting and at bed time 200 Each 3    aspirin delayed-release 81 mg tablet Take 1 Tab by mouth daily. (Patient taking differently: Take 81 mg by mouth as needed.) 30 Tab 0    melatonin 3 mg tablet Take 1 Tab by mouth nightly as needed. 30 Tab 0    Nebulizer Accessories kit by Nebulization route every six (6) hours as needed for Cough. 1 Kit 0       Allergies:   No Known Allergies    Smoker:  Social History     Tobacco Use   Smoking Status Former Smoker   Smokeless Tobacco Never Used       ETOH:   Social History     Substance and Sexual Activity   Alcohol Use Not Currently       FH:   Family History   Problem Relation Age of Onset    Cancer Mother     Other Father         Leukemia       ROS:   As listed in HPI. In addition:  Constitutional:   No headache, fever, fatigue, weight loss or weight gain      Cardiac:    No chest pain      Resp:   No cough or shortness of breath      Neuro   No loss of consciousness, dizziness, seizures      Physical Exam:  There were no vitals taken for this visit. GEN: No apparent distress. Alert and oriented and responds to all questions appropriately. NEUROLOGIC:  No focal neurologic deficits. Coordination and gait grossly intact. EXT: Well perfused. No edema. SKIN: No obvious rashes. Due to this being a TeleHealth evaluation, many elements of the physical examination are unable to be assessed. Assessment and Plan     Wrist sprain  Resulting from fall on outstretched hand in January  Appears to be manifesting as a problem with his flexor tendons  Because of duration will refer to Ortho    Provided with some basic exercises.       He has a friend who specializes in hand physical rehabilitation. He would like to see her. I can either write a referral or sign a order if it is sent to me    Diabetes  He is losing weight with the help of a low-carb diet. His goal weight is 180  He would like to follow-up in June with an A1c. We can consider reducing dose of Metformin if he is doing well      ICD-10-CM ICD-9-CM    1. Sprain of left wrist, subsequent encounter  S63.502D V58.89 REFERRAL TO ORTHOPEDICS     842.00    2. Type 2 diabetes with nephropathy (HCC)  E11.21 250.40      583.81    3. Obesity, morbid ([facility] [address]  E66.01 278.01          Pursuant to the emergency declaration under the [facility] waiver authority and the [employer] Act, this Virtual  Visit was conducted, with patient's consent, to reduce the patient's risk of exposure to COVID-19 and provide continuity of care for an established patient. Services were provided through a video synchronous discussion virtually to substitute for in-person clinic visit.

## 2021-03-30 NOTE — PROGRESS NOTES
1. Have you been to the ER, urgent care clinic since your last visit? Hospitalized since your last visit? No    2. Have you seen or consulted any other health care providers outside of the [address] [place] [address] since your last visit? Include any pap smears or colon screening.  No     Health Maintenance Due   Topic Date Due    COVID-19 Vaccine (1) Never done    DTaP/Tdap/Td series (1 - Tdap) Never done    Shingrix Vaccine Age 50> (2 of 2) 08/22/2018    Foot Exam Q1  09/21/2019    MICROALBUMIN Q1  01/20/2021

## 2021-04-23 DIAGNOSIS — F33.0 MILD EPISODE OF RECURRENT MAJOR DEPRESSIVE DISORDER (HCC): ICD-10-CM

## 2021-04-23 DIAGNOSIS — Z87.39 HISTORY OF GOUT: ICD-10-CM

## 2021-04-23 DIAGNOSIS — F32.A DEPRESSION, UNSPECIFIED DEPRESSION TYPE: ICD-10-CM

## 2021-04-23 RX ORDER — ALLOPURINOL 100 MG/1
200 TABLET ORAL DAILY
Qty: 180 TAB | Refills: 3 | Status: SHIPPED | OUTPATIENT
Start: 2021-04-23 | End: 2022-04-15 | Stop reason: SDUPTHER

## 2021-04-23 RX ORDER — SERTRALINE HYDROCHLORIDE 25 MG/1
25 TABLET, FILM COATED ORAL DAILY
Qty: 90 TAB | Refills: 3 | Status: SHIPPED | OUTPATIENT
Start: 2021-04-23 | End: 2022-02-17 | Stop reason: SDUPTHER

## 2021-06-23 ENCOUNTER — OFFICE VISIT (OUTPATIENT)
Dept: FAMILY MEDICINE CLINIC | Age: 66
End: 2021-06-23
Payer: COMMERCIAL

## 2021-06-23 VITALS
HEIGHT: 65 IN | TEMPERATURE: 98.3 F | HEART RATE: 98 BPM | SYSTOLIC BLOOD PRESSURE: 114 MMHG | BODY MASS INDEX: 33.82 KG/M2 | DIASTOLIC BLOOD PRESSURE: 82 MMHG | OXYGEN SATURATION: 98 % | RESPIRATION RATE: 16 BRPM | WEIGHT: 203 LBS

## 2021-06-23 DIAGNOSIS — E11.21 TYPE 2 DIABETES WITH NEPHROPATHY (HCC): ICD-10-CM

## 2021-06-23 DIAGNOSIS — E66.01 OBESITY, MORBID (HCC): ICD-10-CM

## 2021-06-23 DIAGNOSIS — Z00.00 ROUTINE GENERAL MEDICAL EXAMINATION AT A HEALTH CARE FACILITY: Primary | ICD-10-CM

## 2021-06-23 DIAGNOSIS — I10 ESSENTIAL HYPERTENSION: ICD-10-CM

## 2021-06-23 LAB
ALBUMIN UR QL STRIP: 150 MG/L
CREATININE, URINE POC: 200 MG/DL
MICROALBUMIN/CREAT RATIO POC: NORMAL MG/G

## 2021-06-23 PROCEDURE — 82044 UR ALBUMIN SEMIQUANTITATIVE: CPT | Performed by: FAMILY MEDICINE

## 2021-06-23 PROCEDURE — 99397 PER PM REEVAL EST PAT 65+ YR: CPT | Performed by: FAMILY MEDICINE

## 2021-06-23 RX ORDER — CLOTRIMAZOLE AND BETAMETHASONE DIPROPIONATE 10; .64 MG/G; MG/G
CREAM TOPICAL
COMMUNITY
Start: 2021-06-18

## 2021-06-23 NOTE — PROGRESS NOTES
Vincent Salas is a 77 y.o. male  Chief Complaint   Patient presents with    Follow-up     follow up     Health Maintenance Due   Topic Date Due    COVID-19 Vaccine (1) Never done    DTaP/Tdap/Td series (1 - Tdap) Never done    Shingrix Vaccine Age 50> (2 of 2) 08/22/2018    Foot Exam Q1  09/21/2019    MICROALBUMIN Q1  01/20/2021     Visit Vitals  Ht 5' 5\" (1.651 m)   Wt 203 lb (92.1 kg)   BMI 33.78 kg/m²     1. Have you been to the ER, urgent care clinic since your last visit? Hospitalized since your last visit? Yes Where: [address]    2. Have you seen or consulted any other health care providers outside of the [address] [place] [address] since your last visit? Include any pap smears or colon screening. Yes Reason for visit: Sonu Grubbs

## 2021-06-23 NOTE — PROGRESS NOTES
JOON Maya is a 77 y.o. male who presents to follow-up on chronic medical issues. He has been doing a keto diet and his blood sugars have been consistently 80100 fasting. He is continue to take Metformin 1000 mg twice daily but is interested in reducing his dose. PMHx:  Past Medical History:   Diagnosis Date    Hypertension        Meds:   Current Outpatient Medications   Medication Sig Dispense Refill    allopurinoL (ZYLOPRIM) 100 mg tablet Take 2 Tabs by mouth daily. 180 Tab 3    sertraline (ZOLOFT) 25 mg tablet Take 1 Tab by mouth daily. 90 Tab 3    APPLE CIDER VINEGAR PO Take  by mouth daily.  hydroCHLOROthiazide (HYDRODIURIL) 12.5 mg tablet Take 1 Tab by mouth daily. 90 Tab 3    amLODIPine (NORVASC) 10 mg tablet Take 1 Tab by mouth daily. 90 Tab 3    lisinopriL (PRINIVIL, ZESTRIL) 10 mg tablet Take 1 Tab by mouth daily. 90 Tab 3    diclofenac EC (VOLTAREN) 75 mg EC tablet Take 1 Tab by mouth two (2) times daily as needed for Pain. 60 Tab 5    metFORMIN (GLUCOPHAGE) 1,000 mg tablet Take 1 Tab by mouth two (2) times daily (with meals). 180 Tab 0    glucose blood VI test strips (OneTouch Ultra Blue Test Strip) strip Please check blood sugar twice daily, E11.9 200 Strip 11    indomethacin (INDOCIN) 50 mg capsule Take 1 pill TID PO for duration of gout flare 90 Cap 0    Lancets misc Check blood glucose fasting and at bed time 200 Each 3    aspirin delayed-release 81 mg tablet Take 1 Tab by mouth daily. (Patient taking differently: Take 81 mg by mouth as needed.) 30 Tab 0    melatonin 3 mg tablet Take 1 Tab by mouth nightly as needed. 30 Tab 0    Nebulizer Accessories kit by Nebulization route every six (6) hours as needed for Cough.  1 Kit 0    clotrimazole-betamethasone (LOTRISONE) topical cream          Allergies:   No Known Allergies    Smoker:  Social History     Tobacco Use   Smoking Status Former Smoker   Smokeless Tobacco Never Used       ETOH:   Social History     Substance and Sexual Activity   Alcohol Use Not Currently       FH:   Family History   Problem Relation Age of Onset    Cancer Mother     Other Father         Leukemia       ROS:   As listed in HPI. In addition:  Constitutional:   No headache, fever, fatigue, weight loss or weight gain      Cardiac:    No chest pain      Resp:   No cough or shortness of breath      Neuro   No loss of consciousness, dizziness, seizures      Physical Exam:  Blood pressure 114/82, pulse 98, temperature 98.3 °F (36.8 °C), temperature source Oral, resp. rate 16, height 5' 5\" (1.651 m), weight 203 lb (92.1 kg), SpO2 98 %. GEN: No apparent distress. Alert and oriented and responds to all questions appropriately. NEUROLOGIC:  No focal neurologic deficits. Strength and sensation grossly intact. Coordination and gait grossly intact. EXT: Well perfused. No edema. SKIN: No obvious rashes. Lungs clear to auscultation bilaterally  CV regular rhythm no murmur       Assessment and Plan     Wellness  Surveillance labs    Diabetes  A1c. Expecting this to be excellent  He is interested in trying a lower dose of Metformin. Cut back to 500 mg twice daily    Hypertension  Well-controlled    Obese  Lost 30 pounds in the last several months. His goal weight is 180      ICD-10-CM ICD-9-CM    1. Routine general medical examination at a health care facility  Z00.00 V70.0 LIPID PANEL      CBC WITH AUTOMATED DIFF      METABOLIC PANEL, COMPREHENSIVE      HEMOGLOBIN A1C WITH EAG      AMB POC URINE, MICROALBUMIN, SEMIQUANT (3 RESULTS)   2. Essential hypertension  I10 401.9 LIPID PANEL      CBC WITH AUTOMATED DIFF      METABOLIC PANEL, COMPREHENSIVE   3. Type 2 diabetes with nephropathy (HCC)  E11.21 250.40 HEMOGLOBIN A1C WITH EAG     583.81 AMB POC URINE, MICROALBUMIN, SEMIQUANT (3 RESULTS)   4. Obesity, morbid ([address]  E66.01 278.01        AVS given.  Pt expressed understanding of instructions

## 2021-06-24 LAB
ALBUMIN SERPL-MCNC: 4.4 G/DL (ref 3.5–5)
ALBUMIN/GLOB SERPL: 1.6 {RATIO} (ref 1.1–2.2)
ALP SERPL-CCNC: 73 U/L (ref 45–117)
ALT SERPL-CCNC: 24 U/L (ref 12–78)
ANION GAP SERPL CALC-SCNC: 8 MMOL/L (ref 5–15)
AST SERPL-CCNC: 13 U/L (ref 15–37)
BASOPHILS # BLD: 0.1 K/UL (ref 0–0.1)
BASOPHILS NFR BLD: 1 % (ref 0–1)
BILIRUB SERPL-MCNC: 0.5 MG/DL (ref 0.2–1)
BUN SERPL-MCNC: 35 MG/DL (ref 6–20)
BUN/CREAT SERPL: 22 (ref 12–20)
CALCIUM SERPL-MCNC: 9.7 MG/DL (ref 8.5–10.1)
CHLORIDE SERPL-SCNC: 106 MMOL/L (ref 97–108)
CHOLEST SERPL-MCNC: 144 MG/DL
CO2 SERPL-SCNC: 29 MMOL/L (ref 21–32)
CREAT SERPL-MCNC: 1.61 MG/DL (ref 0.7–1.3)
DIFFERENTIAL METHOD BLD: ABNORMAL
EOSINOPHIL # BLD: 0.4 K/UL (ref 0–0.4)
EOSINOPHIL NFR BLD: 6 % (ref 0–7)
ERYTHROCYTE [DISTWIDTH] IN BLOOD BY AUTOMATED COUNT: 14 % (ref 11.5–14.5)
EST. AVERAGE GLUCOSE BLD GHB EST-MCNC: 108 MG/DL
GLOBULIN SER CALC-MCNC: 2.7 G/DL (ref 2–4)
GLUCOSE SERPL-MCNC: 86 MG/DL (ref 65–100)
HBA1C MFR BLD: 5.4 % (ref 4–5.6)
HCT VFR BLD AUTO: 42.4 % (ref 36.6–50.3)
HDLC SERPL-MCNC: 38 MG/DL
HDLC SERPL: 3.8 {RATIO} (ref 0–5)
HGB BLD-MCNC: 13 G/DL (ref 12.1–17)
IMM GRANULOCYTES # BLD AUTO: 0.1 K/UL (ref 0–0.04)
IMM GRANULOCYTES NFR BLD AUTO: 1 % (ref 0–0.5)
LDLC SERPL CALC-MCNC: 83.8 MG/DL (ref 0–100)
LYMPHOCYTES # BLD: 1.2 K/UL (ref 0.8–3.5)
LYMPHOCYTES NFR BLD: 16 % (ref 12–49)
MCH RBC QN AUTO: 29.7 PG (ref 26–34)
MCHC RBC AUTO-ENTMCNC: 30.7 G/DL (ref 30–36.5)
MCV RBC AUTO: 97 FL (ref 80–99)
MONOCYTES # BLD: 0.9 K/UL (ref 0–1)
MONOCYTES NFR BLD: 11 % (ref 5–13)
NEUTS SEG # BLD: 5.2 K/UL (ref 1.8–8)
NEUTS SEG NFR BLD: 65 % (ref 32–75)
NRBC # BLD: 0 K/UL (ref 0–0.01)
NRBC BLD-RTO: 0 PER 100 WBC
PLATELET # BLD AUTO: 218 K/UL (ref 150–400)
PMV BLD AUTO: 9.9 FL (ref 8.9–12.9)
POTASSIUM SERPL-SCNC: 4.4 MMOL/L (ref 3.5–5.1)
PROT SERPL-MCNC: 7.1 G/DL (ref 6.4–8.2)
RBC # BLD AUTO: 4.37 M/UL (ref 4.1–5.7)
SODIUM SERPL-SCNC: 143 MMOL/L (ref 136–145)
TRIGL SERPL-MCNC: 111 MG/DL (ref ?–150)
VLDLC SERPL CALC-MCNC: 22.2 MG/DL
WBC # BLD AUTO: 7.9 K/UL (ref 4.1–11.1)

## 2021-07-06 ENCOUNTER — TELEPHONE (OUTPATIENT)
Dept: FAMILY MEDICINE CLINIC | Age: 66
End: 2021-07-06

## 2021-07-06 DIAGNOSIS — E11.8 CONTROLLED DIABETES MELLITUS TYPE 2 WITH COMPLICATIONS, UNSPECIFIED WHETHER LONG TERM INSULIN USE (HCC): ICD-10-CM

## 2021-07-06 NOTE — TELEPHONE ENCOUNTER
Patient called regarding [employer] message that was sent.  Pt is requesting his medication to be changed    Please give patient a call back  BCB# [phone]

## 2021-07-07 RX ORDER — METFORMIN HYDROCHLORIDE 500 MG/1
500 TABLET ORAL 2 TIMES DAILY WITH MEALS
Qty: 180 TABLET | Refills: 3 | Status: SHIPPED | OUTPATIENT
Start: 2021-07-07 | End: 2022-06-13 | Stop reason: SDUPTHER

## 2021-07-07 RX ORDER — METFORMIN HYDROCHLORIDE 500 MG/1
1000 TABLET ORAL 2 TIMES DAILY WITH MEALS
Qty: 180 TABLET | Refills: 3 | Status: SHIPPED | OUTPATIENT
Start: 2021-07-07 | End: 2021-07-07 | Stop reason: DRUGHIGH

## 2021-07-27 DIAGNOSIS — E11.21 TYPE 2 DIABETES WITH NEPHROPATHY (HCC): ICD-10-CM

## 2021-07-27 RX ORDER — BLOOD SUGAR DIAGNOSTIC
STRIP MISCELLANEOUS
Qty: 200 STRIP | Refills: 11 | Status: SHIPPED | OUTPATIENT
Start: 2021-07-27 | End: 2022-08-18 | Stop reason: SDUPTHER

## 2021-11-16 DIAGNOSIS — I10 ESSENTIAL HYPERTENSION: ICD-10-CM

## 2021-11-16 RX ORDER — LISINOPRIL 10 MG/1
10 TABLET ORAL DAILY
Qty: 90 TABLET | Refills: 3 | Status: SHIPPED | OUTPATIENT
Start: 2021-11-16 | End: 2021-11-16 | Stop reason: SDUPTHER

## 2021-11-16 NOTE — TELEPHONE ENCOUNTER
[facility] is requesting a refill on med    Requested Prescriptions     Pending Prescriptions Disp Refills    lisinopriL (PRINIVIL, ZESTRIL) 10 mg tablet 90 Tablet 3     Sig: Take 1 Tablet by mouth daily.

## 2021-11-17 RX ORDER — LISINOPRIL 10 MG/1
10 TABLET ORAL DAILY
Qty: 90 TABLET | Refills: 3 | Status: SHIPPED | OUTPATIENT
Start: 2021-11-17

## 2021-12-13 ENCOUNTER — OFFICE VISIT (OUTPATIENT)
Dept: FAMILY MEDICINE CLINIC | Age: 66
End: 2021-12-13
Payer: COMMERCIAL

## 2021-12-13 VITALS
TEMPERATURE: 97.6 F | DIASTOLIC BLOOD PRESSURE: 73 MMHG | RESPIRATION RATE: 16 BRPM | WEIGHT: 216.6 LBS | OXYGEN SATURATION: 95 % | HEART RATE: 96 BPM | HEIGHT: 65 IN | SYSTOLIC BLOOD PRESSURE: 119 MMHG | BODY MASS INDEX: 36.09 KG/M2

## 2021-12-13 DIAGNOSIS — L30.9 ECZEMA, UNSPECIFIED TYPE: Primary | ICD-10-CM

## 2021-12-13 PROCEDURE — 99213 OFFICE O/P EST LOW 20 MIN: CPT | Performed by: FAMILY MEDICINE

## 2021-12-13 RX ORDER — TRIAMCINOLONE ACETONIDE 1 MG/G
CREAM TOPICAL 2 TIMES DAILY
Qty: 80 G | Refills: 3 | Status: SHIPPED | OUTPATIENT
Start: 2021-12-13

## 2021-12-13 NOTE — PROGRESS NOTES
JOON Garcia is a 77 y.o. male who presents with a rash in his left shin that has been present since June and a rash on his right forearm this been present for 2 weeks. It is red, slightly raised, itchy. He has switched over to sensitive skin products. He is continually scratching at it. He is using cortisone with some relief. Using a sensitive skin lotion with some relief. Requires Benadryl at night to help with sleep    PMHx:  Past Medical History:   Diagnosis Date    Hypertension        Meds:   Current Outpatient Medications   Medication Sig Dispense Refill    triamcinolone acetonide (KENALOG) 0.1 % topical cream Apply  to affected area two (2) times a day. use thin layer 80 g 3    lisinopriL (PRINIVIL, ZESTRIL) 10 mg tablet Take 1 Tablet by mouth daily. 90 Tablet 3    glucose blood VI test strips (OneTouch Ultra Test) strip USE TO CHECK BLOOD SUGAR TWICE A  Strip 11    metFORMIN (GLUCOPHAGE) 500 mg tablet Take 1 Tablet by mouth two (2) times daily (with meals). 180 Tablet 3    clotrimazole-betamethasone (LOTRISONE) topical cream       allopurinoL (ZYLOPRIM) 100 mg tablet Take 2 Tabs by mouth daily. 180 Tab 3    sertraline (ZOLOFT) 25 mg tablet Take 1 Tab by mouth daily. 90 Tab 3    hydroCHLOROthiazide (HYDRODIURIL) 12.5 mg tablet Take 1 Tab by mouth daily. 90 Tab 3    amLODIPine (NORVASC) 10 mg tablet Take 1 Tab by mouth daily. 90 Tab 3    diclofenac EC (VOLTAREN) 75 mg EC tablet Take 1 Tab by mouth two (2) times daily as needed for Pain. 60 Tab 5    indomethacin (INDOCIN) 50 mg capsule Take 1 pill TID PO for duration of gout flare 90 Cap 0    Lancets misc Check blood glucose fasting and at bed time 200 Each 3    aspirin delayed-release 81 mg tablet Take 1 Tab by mouth daily. (Patient taking differently: Take 81 mg by mouth as needed.) 30 Tab 0    Nebulizer Accessories kit by Nebulization route every six (6) hours as needed for Cough.  1 Kit 0    APPLE CIDER VINEGAR PO Take  by mouth daily. (Patient not taking: Reported on 12/13/2021)      melatonin 3 mg tablet Take 1 Tab by mouth nightly as needed. (Patient not taking: Reported on 12/13/2021) 30 Tab 0       Allergies:   No Known Allergies    Smoker:  Social History     Tobacco Use   Smoking Status Former Smoker   Smokeless Tobacco Never Used       ETOH:   Social History     Substance and Sexual Activity   Alcohol Use Not Currently       FH:   Family History   Problem Relation Age of Onset    Cancer Mother     Other Father         Leukemia       ROS:   As listed in HPI. In addition:  Constitutional:   No headache, fever, fatigue, weight loss or weight gain      Cardiac:    No chest pain      Resp:   No cough or shortness of breath      Neuro   No loss of consciousness, dizziness, seizures      Physical Exam:  Blood pressure 119/73, pulse 96, temperature 97.6 °F (36.4 °C), temperature source Temporal, resp. rate 16, height 5' 5\" (1.651 m), weight 216 lb 9.6 oz (98.2 kg), SpO2 95 %. GEN: No apparent distress. Alert and oriented and responds to all questions appropriately. NEUROLOGIC:  No focal neurologic deficits. Strength and sensation grossly intact. Coordination and gait grossly intact. EXT: Well perfused. No edema. SKIN: Dry, mild eczema on the right forearm and the left shin. Mild       Assessment and Plan     Xeroderma/eczema  Topicals:  Kenalog steroid cream twice a day  Benadryl gel as often as you want  Sensitive skin lotion as often as you want. Pills:  Benadryl to help out with sleep  Zyrtec (over-the-counter) to help out with daytime itching. Do not scratch      ICD-10-CM ICD-9-CM    1. Eczema, unspecified type  L30.9 692.9        AVS given.  Pt expressed understanding of instructions

## 2021-12-13 NOTE — PROGRESS NOTES
1. Have you been to the ER, urgent care clinic since your last visit? Hospitalized since your last visit? Yes. [facility] [address]    2. Have you seen or consulted any other health care providers outside of the [address] [place] [address] since your last visit? Include any pap smears or colon screening. No    Health Maintenance Due   Topic Date Due    COVID-19 Vaccine (1) Never done    DTaP/Tdap/Td series (1 - Tdap) Never done    Shingrix Vaccine Age 50> (2 of 2) 08/22/2018    Foot Exam Q1  09/21/2019    Flu Vaccine (1) 09/01/2021    Pneumococcal 65+ years (2 of 2 - PPSV23) 10/03/2021    Colorectal Cancer Screening Combo  10/05/2021     Do you have an [address] in place in the event that you have a healthcare crisis that could impact your decision making as it pertains to your health? NO    Would you like information about Advance Care Planning? NO    Information given.  NO

## 2021-12-13 NOTE — PATIENT INSTRUCTIONS
Eczema  Topicals:  Kenalog steroid cream twice a day  Benadryl gel as often as you want  Sensitive skin lotion as often as you want. Pills:  Benadryl to help out with sleep  Zyrtec (over-the-counter) to help out with daytime itching.     Do not scratch

## 2021-12-27 ENCOUNTER — OFFICE VISIT (OUTPATIENT)
Dept: FAMILY MEDICINE CLINIC | Age: 66
End: 2021-12-27
Payer: COMMERCIAL

## 2021-12-27 DIAGNOSIS — Z12.11 COLON CANCER SCREENING: ICD-10-CM

## 2021-12-27 DIAGNOSIS — Z00.00 ROUTINE GENERAL MEDICAL EXAMINATION AT A HEALTH CARE FACILITY: Primary | ICD-10-CM

## 2021-12-27 DIAGNOSIS — E11.21 TYPE 2 DIABETES WITH NEPHROPATHY (HCC): ICD-10-CM

## 2021-12-27 DIAGNOSIS — I10 PRIMARY HYPERTENSION: ICD-10-CM

## 2021-12-27 LAB
ALBUMIN SERPL-MCNC: 4.4 G/DL (ref 3.5–5)
ALBUMIN/GLOB SERPL: 1.5 {RATIO} (ref 1.1–2.2)
ALP SERPL-CCNC: 79 U/L (ref 45–117)
ALT SERPL-CCNC: 30 U/L (ref 12–78)
ANION GAP SERPL CALC-SCNC: 6 MMOL/L (ref 5–15)
AST SERPL-CCNC: 20 U/L (ref 15–37)
BASOPHILS # BLD: 0.1 K/UL (ref 0–0.1)
BASOPHILS NFR BLD: 1 % (ref 0–1)
BILIRUB SERPL-MCNC: 0.7 MG/DL (ref 0.2–1)
BUN SERPL-MCNC: 27 MG/DL (ref 6–20)
BUN/CREAT SERPL: 19 (ref 12–20)
CALCIUM SERPL-MCNC: 9.4 MG/DL (ref 8.5–10.1)
CHLORIDE SERPL-SCNC: 106 MMOL/L (ref 97–108)
CHOLEST SERPL-MCNC: 157 MG/DL
CO2 SERPL-SCNC: 28 MMOL/L (ref 21–32)
CREAT SERPL-MCNC: 1.45 MG/DL (ref 0.7–1.3)
DIFFERENTIAL METHOD BLD: ABNORMAL
EOSINOPHIL # BLD: 0.4 K/UL (ref 0–0.4)
EOSINOPHIL NFR BLD: 4 % (ref 0–7)
ERYTHROCYTE [DISTWIDTH] IN BLOOD BY AUTOMATED COUNT: 14.2 % (ref 11.5–14.5)
EST. AVERAGE GLUCOSE BLD GHB EST-MCNC: 120 MG/DL
GLOBULIN SER CALC-MCNC: 2.9 G/DL (ref 2–4)
GLUCOSE SERPL-MCNC: 85 MG/DL (ref 65–100)
HBA1C MFR BLD: 5.8 % (ref 4–5.6)
HCT VFR BLD AUTO: 43.8 % (ref 36.6–50.3)
HDLC SERPL-MCNC: 36 MG/DL
HDLC SERPL: 4.4 {RATIO} (ref 0–5)
HGB BLD-MCNC: 13.7 G/DL (ref 12.1–17)
IMM GRANULOCYTES # BLD AUTO: 0.1 K/UL (ref 0–0.04)
IMM GRANULOCYTES NFR BLD AUTO: 1 % (ref 0–0.5)
LDLC SERPL CALC-MCNC: 84.8 MG/DL (ref 0–100)
LYMPHOCYTES # BLD: 1.4 K/UL (ref 0.8–3.5)
LYMPHOCYTES NFR BLD: 16 % (ref 12–49)
MCH RBC QN AUTO: 29 PG (ref 26–34)
MCHC RBC AUTO-ENTMCNC: 31.3 G/DL (ref 30–36.5)
MCV RBC AUTO: 92.6 FL (ref 80–99)
MONOCYTES # BLD: 0.9 K/UL (ref 0–1)
MONOCYTES NFR BLD: 10 % (ref 5–13)
NEUTS SEG # BLD: 6 K/UL (ref 1.8–8)
NEUTS SEG NFR BLD: 68 % (ref 32–75)
NRBC # BLD: 0 K/UL (ref 0–0.01)
NRBC BLD-RTO: 0 PER 100 WBC
PLATELET # BLD AUTO: 187 K/UL (ref 150–400)
PMV BLD AUTO: 9.9 FL (ref 8.9–12.9)
POTASSIUM SERPL-SCNC: 4 MMOL/L (ref 3.5–5.1)
PROT SERPL-MCNC: 7.3 G/DL (ref 6.4–8.2)
RBC # BLD AUTO: 4.73 M/UL (ref 4.1–5.7)
SODIUM SERPL-SCNC: 140 MMOL/L (ref 136–145)
TRIGL SERPL-MCNC: 181 MG/DL (ref ?–150)
VLDLC SERPL CALC-MCNC: 36.2 MG/DL
WBC # BLD AUTO: 8.8 K/UL (ref 4.1–11.1)

## 2021-12-27 PROCEDURE — 99397 PER PM REEVAL EST PAT 65+ YR: CPT | Performed by: FAMILY MEDICINE

## 2021-12-27 NOTE — PROGRESS NOTES
Chief Complaint   Patient presents with    Diabetes     follow-up      Health Maintenance reviewed     1. Have you been to the ER, urgent care clinic since your last visit? Hospitalized since your last visit? No     2. Have you seen or consulted any other health care providers outside of the [address] [place] [address] since your last visit? Include any pap smears or colon screening.   No

## 2021-12-27 NOTE — PROGRESS NOTES
HPI  Taylor Steve is a 77 y.o. male who presents to follow-up on chronic medical issues. He checks his blood sugar religiously. It is in the 90s100s. He has gained 6 pounds in the last 2 weeks since he last done on our scale. This was due to the Christmas holiday and he plans to go on a diet next week. His blood sugar does not appear to have been affected. He \"feels old\". He is not getting much exercise. Needs to take a break after walking around the mall for an hour    PMHx:  Past Medical History:   Diagnosis Date    Hypertension        Meds:   Current Outpatient Medications   Medication Sig Dispense Refill    triamcinolone acetonide (KENALOG) 0.1 % topical cream Apply  to affected area two (2) times a day. use thin layer 80 g 3    lisinopriL (PRINIVIL, ZESTRIL) 10 mg tablet Take 1 Tablet by mouth daily. 90 Tablet 3    glucose blood VI test strips (OneTouch Ultra Test) strip USE TO CHECK BLOOD SUGAR TWICE A  Strip 11    metFORMIN (GLUCOPHAGE) 500 mg tablet Take 1 Tablet by mouth two (2) times daily (with meals). 180 Tablet 3    clotrimazole-betamethasone (LOTRISONE) topical cream       allopurinoL (ZYLOPRIM) 100 mg tablet Take 2 Tabs by mouth daily. 180 Tab 3    sertraline (ZOLOFT) 25 mg tablet Take 1 Tab by mouth daily. 90 Tab 3    hydroCHLOROthiazide (HYDRODIURIL) 12.5 mg tablet Take 1 Tab by mouth daily. 90 Tab 3    amLODIPine (NORVASC) 10 mg tablet Take 1 Tab by mouth daily. 90 Tab 3    diclofenac EC (VOLTAREN) 75 mg EC tablet Take 1 Tab by mouth two (2) times daily as needed for Pain. 60 Tab 5    indomethacin (INDOCIN) 50 mg capsule Take 1 pill TID PO for duration of gout flare 90 Cap 0    Lancets misc Check blood glucose fasting and at bed time 200 Each 3    Nebulizer Accessories kit by Nebulization route every six (6) hours as needed for Cough. 1 Kit 0    APPLE CIDER VINEGAR PO Take  by mouth daily.  (Patient not taking: Reported on 12/27/2021)      aspirin delayed-release 81 mg tablet Take 1 Tab by mouth daily. (Patient not taking: Reported on 12/27/2021) 30 Tab 0    melatonin 3 mg tablet Take 1 Tab by mouth nightly as needed. (Patient not taking: Reported on 12/27/2021) 30 Tab 0       Allergies:   No Known Allergies    Smoker:  Social History     Tobacco Use   Smoking Status Former Smoker   Smokeless Tobacco Never Used       ETOH:   Social History     Substance and Sexual Activity   Alcohol Use Not Currently       FH:   Family History   Problem Relation Age of Onset    Cancer Mother     Other Father         Leukemia       ROS:   As listed in HPI. In addition:  Constitutional:   No headache, fever, fatigue, weight loss or weight gain      Cardiac:    No chest pain      Resp:   No cough or shortness of breath      Neuro   No loss of consciousness, dizziness, seizures      Physical Exam:  There were no vitals taken for this visit. GEN: No apparent distress. Alert and oriented and responds to all questions appropriately. NEUROLOGIC:  No focal neurologic deficits. Strength and sensation grossly intact. Coordination and gait grossly intact. EXT: Well perfused. No edema. SKIN: No obvious rashes. Lungs clear to auscultation bilaterally  CV regular rate rhythm no murmur       Assessment and Plan     Wellness  Surveillance labs  FIT kit  He is pretty sure he completed the Shingrix series and got his Pneumovax 21 after age 72. He will have a record of you do this. It was either [employer] [facility] or [facility]. Diabetes  Expect this to be well controlled based on home readings and previous A1c's. Hypertension  No symptoms of hypotension  Lisinopril 10 mg for microhematuria  HCTZ 12.5  Amlodipine 10 mg (gets occasional leg swelling)    He would like to try less blood pressure medicine. Has a cuff at home but has not been using it. Check your blood pressure every day for the next week then reduce the amlodipine to 5 mg and continue checking blood pressure.   After 1 week may trial off of this medicine if blood pressure remains well controlled      ICD-10-CM ICD-9-CM    1. Routine general medical examination at a health care facility  Z00.00 V70.0 LIPID PANEL      CBC WITH AUTOMATED DIFF      METABOLIC PANEL, COMPREHENSIVE      HEMOGLOBIN A1C WITH EAG   2. Primary hypertension  I10 401.9 LIPID PANEL      CBC WITH AUTOMATED DIFF      METABOLIC PANEL, COMPREHENSIVE      HEMOGLOBIN A1C WITH EAG   3. Type 2 diabetes with nephropathy (HCC)  E11.21 250.40 HEMOGLOBIN A1C WITH EAG     583.81    4. Colon cancer screening  Z12.11 V76.51 OCCULT BLOOD IMMUNOASSAY,DIAGNOSTIC      OCCULT BLOOD IMMUNOASSAY,DIAGNOSTIC       AVS given.  Pt expressed understanding of instructions

## 2022-01-04 LAB — HEMOCCULT STL QL IA: NEGATIVE

## 2022-01-17 DIAGNOSIS — I10 ESSENTIAL HYPERTENSION: ICD-10-CM

## 2022-01-17 NOTE — TELEPHONE ENCOUNTER
Refill Request (fax sent over)     Requested Prescriptions     Pending Prescriptions Disp Refills    amLODIPine (NORVASC) 10 mg tablet 90 Tablet 3     Sig: Take 1 Tablet by mouth daily.  hydroCHLOROthiazide (HYDRODIURIL) 12.5 mg tablet 90 Tablet 3     Sig: Take 1 Tablet by mouth daily.      Thank You

## 2022-01-18 RX ORDER — AMLODIPINE BESYLATE 10 MG/1
10 TABLET ORAL DAILY
Qty: 90 TABLET | Refills: 3 | Status: SHIPPED | OUTPATIENT
Start: 2022-01-18

## 2022-01-18 RX ORDER — HYDROCHLOROTHIAZIDE 12.5 MG/1
12.5 TABLET ORAL DAILY
Qty: 90 TABLET | Refills: 3 | Status: SHIPPED | OUTPATIENT
Start: 2022-01-18

## 2022-02-07 DIAGNOSIS — I10 ESSENTIAL HYPERTENSION: ICD-10-CM

## 2022-02-07 DIAGNOSIS — L30.9 ECZEMA, UNSPECIFIED TYPE: Primary | ICD-10-CM

## 2022-02-17 DIAGNOSIS — F33.0 MILD EPISODE OF RECURRENT MAJOR DEPRESSIVE DISORDER (HCC): ICD-10-CM

## 2022-02-17 DIAGNOSIS — F32.A DEPRESSION, UNSPECIFIED DEPRESSION TYPE: ICD-10-CM

## 2022-02-17 RX ORDER — SERTRALINE HYDROCHLORIDE 25 MG/1
25 TABLET, FILM COATED ORAL DAILY
Qty: 90 TABLET | Refills: 3 | Status: SHIPPED | OUTPATIENT
Start: 2022-02-17

## 2022-02-17 NOTE — TELEPHONE ENCOUNTER
Refill Request ([facility] sent over fax)    Requested Prescriptions     Pending Prescriptions Disp Refills    sertraline (ZOLOFT) 25 mg tablet 90 Tablet 3     Sig: Take 1 Tablet by mouth daily.      Thank You

## 2022-03-18 PROBLEM — J20.9 ACUTE BRONCHITIS: Status: ACTIVE | Noted: 2018-03-22

## 2022-03-18 PROBLEM — E66.01 OBESITY, MORBID (HCC): Status: ACTIVE | Noted: 2018-04-03

## 2022-03-19 PROBLEM — E11.21 TYPE 2 DIABETES WITH NEPHROPATHY (HCC): Status: ACTIVE | Noted: 2018-04-03

## 2022-03-19 PROBLEM — F32.A DEPRESSION: Status: ACTIVE | Noted: 2018-03-22

## 2022-03-20 PROBLEM — I10 HYPERTENSION: Status: ACTIVE | Noted: 2018-03-22

## 2022-04-15 DIAGNOSIS — Z87.39 HISTORY OF GOUT: ICD-10-CM

## 2022-04-15 RX ORDER — ALLOPURINOL 100 MG/1
200 TABLET ORAL DAILY
Qty: 180 TABLET | Refills: 3 | Status: SHIPPED | OUTPATIENT
Start: 2022-04-15

## 2022-04-15 NOTE — TELEPHONE ENCOUNTER
Refill request ([facility] sent fax)     Requested Prescriptions     Pending Prescriptions Disp Refills    allopurinoL (ZYLOPRIM) 100 mg tablet 180 Tablet 3     Sig: Take 2 Tablets by mouth daily.        Thank you

## 2022-04-21 ENCOUNTER — TELEPHONE (OUTPATIENT)
Dept: FAMILY MEDICINE CLINIC | Age: 67
End: 2022-04-21

## 2022-04-22 ENCOUNTER — TELEPHONE (OUTPATIENT)
Dept: FAMILY MEDICINE CLINIC | Age: 67
End: 2022-04-22

## 2022-04-22 DIAGNOSIS — U07.1 COVID-19: Primary | ICD-10-CM

## 2022-04-22 RX ORDER — AZITHROMYCIN 250 MG/1
TABLET, FILM COATED ORAL
Qty: 6 TABLET | Refills: 0 | Status: SHIPPED | OUTPATIENT
Start: 2022-04-22 | End: 2022-04-27

## 2022-04-22 NOTE — TELEPHONE ENCOUNTER
verified. Pt tested Covid (+) yesterday. He is taking zinc, vitamin c, and ibuprofen. He requests any other suggestions.
----- Message from [facility] sent at 4/21/2022  2:07 PM EDT -----  Subject: Message to Provider    QUESTIONS  Information for Provider? Derrick is wanting Dr Rita Young to call him a Z pack. He   is having sinus congestion, cough, and a scratchy throat. Please call it   into [facility] [address] in [place]. Please call when it has   been called in and with any questions or concerns. ---------------------------------------------------------------------------  --------------  [employer] INFO  What is the best way for the office to contact you? OK to leave message on   voicemail, OK to respond with electronic message via [employer] portal (only   for patients who have registered [employer] account)  Preferred Call Back Phone Number? [phone]  ---------------------------------------------------------------------------  --------------  SCRIPT ANSWERS  Relationship to Patient?  Self
Pt is calling upset wanting to make sure he gets an zpak because he is going out of town on Monday and he is willing to do a vv with Dr Meagan Ley tomorrow just so he can get some med to help him feel better. Please call pt asap to let him know what he can expect.
Z-Todd symptom
Render Risk Assessment In Note?: no
Detail Level: Simple
Comment: Patient reports rash began in Spring of 2019, initially began in the scalp and then appeared on the arms and hands. Patient consulted with a Dermatologist in [place] who then performed a biopsy. 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 Patient states rash reoccurs despite treatment or exposure to sun.\\n\\Bartolo today’s exams active lesions are not present. Stressed the importance of an active lesion in order to re-biopsy. Patient advised to contact the office when flare occurs for an immediate appointment. In the meantime, patient will discontinue Bryhali and initiate Clobatasol Ointment.
Comment: Patient reports lesions first appeared in the Fall of 2020, reports itchy fluid like blisters with slits that become red and crusty. Patient consulted with PCP Dr. Munira Chapman who prescribed Betamethasone and Mupirocin but lesions did not improve. Patient was then prescribed Betamethasone with Clotrimazole with some improvement but lesions would return despite treatment. PCP recommended patient to have a biopsy by a Dermatologist.\\n\\nPatient is concerned for Herpetic giuseppe however by her description her symptoms are more consistent with Contact Dermatitis or Eczema. At today’s exams active lesions are not present. Flare is resolving, stressed the importance of an active lesion in order to biopsy or test for viral culture. Patient advised to contact the office when flare occurs for an immediate appointment. In the meantime, patient will discontinue Betamethasone with Clotrimazole and initiate Clobatasol Ointment.

## 2022-04-22 NOTE — TELEPHONE ENCOUNTER
Returned his call. Symptoms started yesterday 4/21. Was exposed to his partner who was sick when I saw him on 4/14. Who in turn was exposed to a coworker with COVID the prior week. Positive home COVID test.  These tests do not produce false positives. No need to repeat  Greater than 65, hypertension, diabetes  Candidate for paxlovid.   This was sent to the [facility]

## 2022-04-22 NOTE — TELEPHONE ENCOUNTER
----- [facility] sent at 4/22/2022  8:03 AM EDT -----  Subject: Message to Provider    QUESTIONS  Information for Provider? Patient calling this morning to state that he   took an at home covid test and was positive. Patient is wanting to know   what he can take and do for being positive for covid. Please call patient   today.  ---------------------------------------------------------------------------  --------------  CALL BACK INFO  What is the best way for the office to contact you? OK to leave message on   voicemail  Preferred Call Back Phone Number? [phone]  ---------------------------------------------------------------------------  --------------  SCRIPT ANSWERS  Relationship to Patient?  Self

## 2022-04-25 ENCOUNTER — TELEPHONE (OUTPATIENT)
Dept: FAMILY MEDICINE CLINIC | Age: 67
End: 2022-04-25

## 2022-04-25 DIAGNOSIS — U07.1 COVID-19: ICD-10-CM

## 2022-04-25 NOTE — TELEPHONE ENCOUNTER
Pharmacy is calling with questions on RX Paxlovid.  They want to know if the pt has any retinal isses

## 2022-04-25 NOTE — TELEPHONE ENCOUNTER
Noted.  GFR is 40-50. This requires renal adjustment of the Paxlovid.   We will resend the appropriate dose

## 2022-04-28 ENCOUNTER — TELEPHONE (OUTPATIENT)
Dept: FAMILY MEDICINE CLINIC | Age: 67
End: 2022-04-28

## 2022-04-28 NOTE — TELEPHONE ENCOUNTER
Refill Request/New Prescription ([facility] sent a fax)      [address] QTY:30  SIG: TAKE 1 TABLET BY MOUTH TWICE DAILY FOR 5 DAYS    MESSAGE: [facility] PRESCRIPTION REQUIRES SCR, GFR, OR OTHER INFO ON PATIENTS RENAL FUNCTION IN ORDER TO DETERMINE IF THEY NEED THE RENALLY ADJUSTED DOSE, OR IF THE STANDARD DOSE PACK IS APPROPRIATE.  Debora De Oliveira       Thank You

## 2022-04-29 NOTE — TELEPHONE ENCOUNTER
Spoke with pharmacist and confirmed that there was no issue. Medicine has been ready to . Has not been picked up yet.   He is on day 8 so unlikely to be of benefit

## 2022-06-13 RX ORDER — METFORMIN HYDROCHLORIDE 500 MG/1
500 TABLET ORAL 2 TIMES DAILY WITH MEALS
Qty: 180 TABLET | Refills: 3 | Status: SHIPPED | OUTPATIENT
Start: 2022-06-13 | End: 2022-06-14 | Stop reason: SDUPTHER

## 2022-06-14 RX ORDER — METFORMIN HYDROCHLORIDE 500 MG/1
500 TABLET ORAL 2 TIMES DAILY WITH MEALS
Qty: 180 TABLET | Refills: 3 | Status: SHIPPED | OUTPATIENT
Start: 2022-06-14 | End: 2022-07-05 | Stop reason: SDUPTHER

## 2022-06-14 NOTE — TELEPHONE ENCOUNTER
Refill request ( [facility] sent a fax)     Requested Prescriptions     Pending Prescriptions Disp Refills    metFORMIN (GLUCOPHAGE) 500 mg tablet 180 Tablet 3     Sig: Take 1 Tablet by mouth two (2) times daily (with meals).        Thank you

## 2022-07-05 ENCOUNTER — VIRTUAL VISIT (OUTPATIENT)
Dept: FAMILY MEDICINE CLINIC | Age: 67
End: 2022-07-05
Payer: COMMERCIAL

## 2022-07-05 DIAGNOSIS — J06.9 URI WITH COUGH AND CONGESTION: Primary | ICD-10-CM

## 2022-07-05 DIAGNOSIS — N18.30 STAGE 3 CHRONIC KIDNEY DISEASE, UNSPECIFIED WHETHER STAGE 3A OR 3B CKD (HCC): ICD-10-CM

## 2022-07-05 DIAGNOSIS — E11.21 TYPE 2 DIABETES WITH NEPHROPATHY (HCC): ICD-10-CM

## 2022-07-05 PROCEDURE — 1123F ACP DISCUSS/DSCN MKR DOCD: CPT | Performed by: FAMILY MEDICINE

## 2022-07-05 PROCEDURE — 99213 OFFICE O/P EST LOW 20 MIN: CPT | Performed by: FAMILY MEDICINE

## 2022-07-05 RX ORDER — METFORMIN HYDROCHLORIDE 500 MG/1
500 TABLET ORAL 2 TIMES DAILY WITH MEALS
Qty: 180 TABLET | Refills: 3 | Status: SHIPPED | OUTPATIENT
Start: 2022-07-05

## 2022-07-05 RX ORDER — BENZONATATE 200 MG/1
200 CAPSULE ORAL
Qty: 21 CAPSULE | Refills: 1 | Status: SHIPPED | OUTPATIENT
Start: 2022-07-05 | End: 2022-07-12

## 2022-07-05 NOTE — PROGRESS NOTES
Chief Complaint   Patient presents with   [facility] ED Follow-up     follow-up from [address] Maintenance reviewed     1. Have you been to the ER, urgent care clinic since your last visit? Hospitalized since your last visit? Yes, [facility]   2. Have you seen or consulted any other health care providers outside of the [address] [place] [address] since your last visit? Include any pap smears or colon screening.   No

## 2022-08-18 DIAGNOSIS — E11.21 TYPE 2 DIABETES WITH NEPHROPATHY (HCC): ICD-10-CM

## 2022-08-18 RX ORDER — BLOOD SUGAR DIAGNOSTIC
STRIP MISCELLANEOUS
Qty: 200 STRIP | Refills: 11 | Status: SHIPPED | OUTPATIENT
Start: 2022-08-18

## 2022-08-18 NOTE — TELEPHONE ENCOUNTER
Refill Request ( patient calling)   -Pt states he is completely out of test strips    Requested Prescriptions     Pending Prescriptions Disp Refills    glucose blood VI test strips (OneTouch Ultra Test) strip 200 Strip 11     Sig: USE TO CHECK BLOOD SUGAR TWICE A DAY       Thank You

## 2022-09-13 ENCOUNTER — OFFICE VISIT (OUTPATIENT)
Dept: FAMILY MEDICINE CLINIC | Age: 67
End: 2022-09-13
Payer: COMMERCIAL

## 2022-09-13 VITALS
SYSTOLIC BLOOD PRESSURE: 122 MMHG | TEMPERATURE: 97.8 F | DIASTOLIC BLOOD PRESSURE: 70 MMHG | RESPIRATION RATE: 16 BRPM | WEIGHT: 225.8 LBS | OXYGEN SATURATION: 97 % | BODY MASS INDEX: 37.62 KG/M2 | HEART RATE: 48 BPM | HEIGHT: 65 IN

## 2022-09-13 DIAGNOSIS — Z23 ENCOUNTER FOR IMMUNIZATION: ICD-10-CM

## 2022-09-13 DIAGNOSIS — E11.21 TYPE 2 DIABETES WITH NEPHROPATHY (HCC): ICD-10-CM

## 2022-09-13 DIAGNOSIS — Z00.00 ROUTINE GENERAL MEDICAL EXAMINATION AT A HEALTH CARE FACILITY: Primary | ICD-10-CM

## 2022-09-13 DIAGNOSIS — N18.30 STAGE 3 CHRONIC KIDNEY DISEASE, UNSPECIFIED WHETHER STAGE 3A OR 3B CKD (HCC): ICD-10-CM

## 2022-09-13 DIAGNOSIS — I10 ESSENTIAL HYPERTENSION: ICD-10-CM

## 2022-09-13 DIAGNOSIS — E66.01 SEVERE OBESITY (BMI 35.0-39.9) WITH COMORBIDITY (HCC): ICD-10-CM

## 2022-09-13 DIAGNOSIS — Z87.39 HISTORY OF GOUT: ICD-10-CM

## 2022-09-13 DIAGNOSIS — Z12.11 COLON CANCER SCREENING: ICD-10-CM

## 2022-09-13 LAB
ALBUMIN UR QL STRIP: 150 MG/L
CREATININE, URINE POC: 200 MG/DL
HBA1C MFR BLD HPLC: 5.8 %
MICROALBUMIN/CREAT RATIO POC: >300 MG/G

## 2022-09-13 PROCEDURE — 99397 PER PM REEVAL EST PAT 65+ YR: CPT | Performed by: FAMILY MEDICINE

## 2022-09-13 PROCEDURE — 90694 VACC AIIV4 NO PRSRV 0.5ML IM: CPT | Performed by: FAMILY MEDICINE

## 2022-09-13 PROCEDURE — 90471 IMMUNIZATION ADMIN: CPT | Performed by: FAMILY MEDICINE

## 2022-09-13 PROCEDURE — 83036 HEMOGLOBIN GLYCOSYLATED A1C: CPT | Performed by: FAMILY MEDICINE

## 2022-09-13 PROCEDURE — 90472 IMMUNIZATION ADMIN EACH ADD: CPT | Performed by: FAMILY MEDICINE

## 2022-09-13 PROCEDURE — 82044 UR ALBUMIN SEMIQUANTITATIVE: CPT | Performed by: FAMILY MEDICINE

## 2022-09-13 PROCEDURE — 90732 PPSV23 VACC 2 YRS+ SUBQ/IM: CPT | Performed by: FAMILY MEDICINE

## 2022-09-13 RX ORDER — AMMONIUM LACTATE 12 G/100G
LOTION TOPICAL
COMMUNITY
Start: 2022-03-03 | End: 2023-02-26

## 2022-09-13 NOTE — PATIENT INSTRUCTIONS
Vaccine Information Statement    Influenza (Flu) Vaccine (Inactivated or Recombinant): What You Need to Know    Many vaccine information statements are available in French and other languages. See www.immunize.org/vis. Hojas de información sobre vacunas están disponibles en español y en muchos otros idiomas. Visite www.immunize.org/vis. 1. Why get vaccinated? Influenza vaccine can prevent influenza (flu). Flu is a contagious disease that spreads around the [facility] every year, usually between October and May. Anyone can get the flu, but it is more dangerous for some people. Infants and young children, people 72 years and older, pregnant people, and people with certain health conditions or a weakened immune system are at greatest risk of flu complications. Pneumonia, bronchitis, sinus infections, and ear infections are examples of flu-related complications. If you have a medical condition, such as heart disease, cancer, or diabetes, flu can make it worse. Flu can cause fever and chills, sore throat, muscle aches, fatigue, cough, headache, and runny or stuffy nose. Some people may have vomiting and diarrhea, though this is more common in children than adults. In an average year, thousands of people in the [facility] die from flu, and many more are hospitalized. Flu vaccine prevents millions of illnesses and flu-related visits to the doctor each year. 2. Influenza vaccines     CDC recommends everyone 6 months and older get vaccinated every flu season. Children 6 months through 6years of age may need 2 doses during a single flu season. Everyone else needs only 1 dose each flu season. It takes about 2 weeks for protection to develop after vaccination. There are many flu viruses, and they are always changing. Each year a new flu vaccine is made to protect against the influenza viruses believed to be likely to cause disease in the upcoming flu season.  Even when the vaccine doesnt exactly match these viruses, it may still provide some protection. Influenza vaccine does not cause flu. Influenza vaccine may be given at the same time as other vaccines. 3. Talk with your health care provider    Tell your vaccination provider if the person getting the vaccine:  Has had an allergic reaction after a previous dose of influenza vaccine, or has any severe, life-threatening allergies   Has ever had Guillain-Barré Syndrome (also called GBS)    In some cases, your health care provider may decide to postpone influenza vaccination until a future visit. Influenza vaccine can be administered at any time during pregnancy. People who are or will be pregnant during influenza season should receive inactivated influenza vaccine. People with minor illnesses, such as a cold, may be vaccinated. People who are moderately or severely ill should usually wait until they recover before getting influenza vaccine. Your health care provider can give you more information. 4. Risks of a vaccine reaction    Soreness, redness, and swelling where the shot is given, fever, muscle aches, and headache can happen after influenza vaccination. There may be a very small increased risk of Guillain-Barré Syndrome (GBS) after inactivated influenza vaccine (the flu shot). Rayally Polkist children who get the flu shot along with pneumococcal vaccine (PCV13) and/or DTaP vaccine at the same time might be slightly more likely to have a seizure caused by fever. Tell your health care provider if a child who is getting flu vaccine has ever had a seizure. People sometimes faint after medical procedures, including vaccination. Tell your provider if you feel dizzy or have vision changes or ringing in the ears. As with any medicine, there is a very remote chance of a vaccine causing a severe allergic reaction, other serious injury, or death. 5. What if there is a serious problem?     An allergic reaction could occur after the vaccinated person leaves the clinic. If you see signs of a severe allergic reaction (hives, swelling of the face and throat, difficulty breathing, a fast heartbeat, dizziness, or weakness), call 9-1-1 and get the person to the nearest hospital.    For other signs that concern you, call your health care provider. Adverse reactions should be reported to the Vaccine Adverse Event Reporting System (VAERS). Your health care provider will usually file this report, or you can do it yourself. Visit the VAERS website at www.vaers. [facility].gov or call 5-153.311.4761. VAERS is only for reporting reactions, and VAERS staff members do not give medical advice. 6. The National Vaccine Injury Compensation Program    The [facility] Vaccine Injury Compensation Program (VICP) is a federal program that was created to compensate people who may have been injured by certain vaccines. Claims regarding alleged injury or death due to vaccination have a time limit for filing, which may be as short as two years. Visit the VICP website at www.[facility].gov/vaccinecompensation or call 9-661.633.8453 to learn about the program and about filing a claim. 7. How can I learn more? Ask your health care provider. Call your local or state health department. Visit the website of the Food and Drug Administration (FDA) for vaccine package inserts and additional information at www.fda.gov/vaccines-blood-biologics/vaccines. Contact the Centers for Disease Control and Prevention (CDC): Call 6-651.374.2845 (5-279-NGW-INFO) or  Visit CDCs influenza website at www.cdc.gov/flu. Vaccine Information Statement   Inactivated Influenza Vaccine   8/6/2021  42 ASHLEY Parksgerardo Red [id number]   Department of Health and Human Services  Centers for Disease Control and Prevention    Office Use Only      Vaccine Information Statement    Pneumococcal Polysaccharide Vaccine (PPSV23):  What You Need to Know    Many Vaccine Information Statements are available in Yoruba and other languages. See www.immunize.org/vis  Hojas de información sobre vacunas están disponibles en español y en muchos otros idiomas. Visite www.immunize.org/vis    1. Why get vaccinated? Pneumococcal polysaccharide vaccine (PPSV23) can prevent pneumococcal disease. Pneumococcal disease refers to any illness caused by pneumococcal bacteria. These bacteria can cause many types of illnesses, including pneumonia, which is an infection of the lungs. Pneumococcal bacteria are one of the most common causes of pneumonia. Besides pneumonia, pneumococcal bacteria can also cause:  Ear infections  Sinus infections  Meningitis (infection of the tissue covering the brain and spinal cord)  Bacteremia (bloodstream infection)    Anyone can get pneumococcal disease, but children under 3years of age, people with certain medical conditions, adults 72 years or older, and cigarette smokers are at the highest risk. Most pneumococcal infections are mild. However, some can result in long-term problems, such as brain damage or hearing loss. Meningitis, bacteremia, and pneumonia caused by pneumococcal disease can be fatal.     2. PPSV23     PPSV23 protects against 23 types of bacteria that cause pneumococcal disease. PPSV23 is recommended for: All adults 72 years or older,  Anyone 2 years or older with certain medical conditions that can lead to an increased risk for pneumococcal disease. Most people need only one dose of PPSV23. A second dose of PPSV23, and another type of pneumococcal vaccine called PCV13, are recommended for certain high-risk groups. Your health care provider can give you more information.     People 65 years or older should get a dose of PPSV23 even if they have already gotten one or more doses of the vaccine before they turned 72.    3. Talk with your health care provider    Tell your vaccine provider if the person getting the vaccine:  Has had an allergic reaction after a previous dose of PPSV23, or has any severe, life-threatening allergies. In some cases, your health care provider may decide to postpone PPSV23 vaccination to a future visit. People with minor illnesses, such as a cold, may be vaccinated. People who are moderately or severely ill should usually wait until they recover before getting PPSV23. Your health care provider can give you more information. 4. Risks of a vaccine reaction    Redness or pain where the shot is given, feeling tired, fever, or muscle aches can happen after PPSV23. People sometimes faint after medical procedures, including vaccination. Tell your provider if you feel dizzy or have vision changes or ringing in the ears. As with any medicine, there is a very remote chance of a vaccine causing a severe allergic reaction, other serious injury, or death. 5. What if there is a serious problem? An allergic reaction could occur after the vaccinated person leaves the clinic. If you see signs of a severe allergic reaction (hives, swelling of the face and throat, difficulty breathing, a fast heartbeat, dizziness, or weakness), call 9-1-1 and get the person to the nearest hospital.    For other signs that concern you, call your health care provider. Adverse reactions should be reported to the Vaccine Adverse Event Reporting System (VAERS). Your health care provider will usually file this report, or you can do it yourself. Visit the VAERS website at www.vaers. hhs.gov or call 9-562.740.2602. VAERS is only for reporting reactions, and VAERS staff do not give medical advice. 6. How can I learn more? Ask your health care provider. Call your local or state health department. Contact the Centers for Disease Control and Prevention (CDC):   Call 1-505.107.4587 (1-800-CDC-INFO) or  Visit CDCs website at www.cdc.gov/vaccines    Vaccine Information Statement   PPSV23   10/30/2019    [facility] Disease Control and Prevention    Office Use Only

## 2022-09-13 NOTE — PROGRESS NOTES
HPI  Melanie Dixon is a 79 y.o. male who presents to follow-up on diabetes and hypertension. Feels like his sugar has gone up a little bit. Gained 10 pounds. Increased his metformin from 500 mg twice daily to 1000 mg twice daily. Fasting blood sugars 110 which is higher than he likes to see. PMHx:  Past Medical History:   Diagnosis Date    Hypertension        Meds:   Current Outpatient Medications   Medication Sig Dispense Refill    ammonium lactate (LAC-HYDRIN) 12 % lotion Apply to all skin on feet, ankles, and legs      glucose blood VI test strips (OneTouch Ultra Test) strip USE TO CHECK BLOOD SUGAR TWICE A  Strip 11    metFORMIN (GLUCOPHAGE) 500 mg tablet Take 1 Tablet by mouth two (2) times daily (with meals). 180 Tablet 3    allopurinoL (ZYLOPRIM) 100 mg tablet Take 2 Tablets by mouth daily. 180 Tablet 3    sertraline (ZOLOFT) 25 mg tablet Take 1 Tablet by mouth daily. 90 Tablet 3    amLODIPine (NORVASC) 10 mg tablet Take 1 Tablet by mouth daily. 90 Tablet 3    hydroCHLOROthiazide (HYDRODIURIL) 12.5 mg tablet Take 1 Tablet by mouth daily. 90 Tablet 3    triamcinolone acetonide (KENALOG) 0.1 % topical cream Apply  to affected area two (2) times a day. use thin layer 80 g 3    lisinopriL (PRINIVIL, ZESTRIL) 10 mg tablet Take 1 Tablet by mouth daily. 90 Tablet 3    clotrimazole-betamethasone (LOTRISONE) topical cream       diclofenac EC (VOLTAREN) 75 mg EC tablet Take 1 Tab by mouth two (2) times daily as needed for Pain. 60 Tab 5    indomethacin (INDOCIN) 50 mg capsule Take 1 pill TID PO for duration of gout flare 90 Cap 0    Lancets misc Check blood glucose fasting and at bed time 200 Each 3    Nebulizer Accessories kit by Nebulization route every six (6) hours as needed for Cough.  1 Kit 0       Allergies:   No Known Allergies    Smoker:  Social History     Tobacco Use   Smoking Status Former   Smokeless Tobacco Never       ETOH:   Social History     Substance and Sexual Activity   Alcohol Use Not Currently       FH:   Family History   Problem Relation Age of Onset    Cancer Mother     Other Father         Leukemia       ROS:   As listed in HPI. In addition:  Constitutional:   No headache, fever, fatigue, weight loss or weight gain      Cardiac:    No chest pain      Resp:   No cough or shortness of breath      Neuro   No loss of consciousness, dizziness, seizures      Physical Exam:  Blood pressure 122/70, pulse (!) 48, temperature 97.8 °F (36.6 °C), temperature source Temporal, resp. rate 16, height 5' 5\" (1.651 m), weight 225 lb 12.8 oz (102.4 kg), SpO2 97 %. GEN: No apparent distress. Alert and oriented and responds to all questions appropriately. NEUROLOGIC:  No focal neurologic deficits. Strength and sensation grossly intact. Coordination and gait grossly intact. EXT: Well perfused. No edema. SKIN: No obvious rashes. Lungs clear to auscultation bilaterally  CV regular rate rhythm no murmur  HEENT Dibent membrane the right is clear, tympanic membrane left is obscured by thick impacted cerumen. This was pushed down by scoop but little bit too hard to remove. It did improve his hearing to shift the cerumen       Assessment and Plan     Wellness  Surveillance labs  FIT kit  Flu shot today  Pneumovax 23 to complete pneumonia series  New COVID shot when available. History of gout  No recent flares  Allopurinol. Diabetes  A1c 5.8 stable from previous  Metformin 1000 mg twice daily. Plans to go on diet and exercise program.  Go back to taking metformin 500 mg twice daily when you feel like your sugar is doing better. CKD 3  Proteinuria  Lisinopril  Monitor  Avoid NSAIDs. Hand arthritis  Provided some basic exercises. Recommend nonmedicated topicals. May be able to use topical diclofenac sparingly but use caution with kidneys. Hypertension well-controlled  Amlodipine 10 mg  Hydrochlorothiazide 12.5  Lisinopril 10      ICD-10-CM ICD-9-CM    1.  Routine general medical examination at a health care facility  Z00.00 V70.0       2. Type 2 diabetes with nephropathy (HCC)  E11.21 250.40 AMB POC HEMOGLOBIN A1C     583.81 AMB POC URINE, MICROALBUMIN, SEMIQUANT (3 RESULTS)      3. Severe obesity (BMI 35.0-39. 9) with comorbidity ([facility] [address]  E66.01 278.01       4. Stage 3 chronic kidney disease, unspecified whether stage 3a or 3b CKD (HCC)  N18.30 585.3       5. History of gout  Z87.39 V12.29 URIC ACID      6. Essential hypertension  O58 013.7 METABOLIC PANEL, COMPREHENSIVE      CBC WITH AUTOMATED DIFF      7. Colon cancer screening  Z12.11 V76.51 OCCULT BLOOD IMMUNOASSAY,DIAGNOSTIC      OCCULT BLOOD IMMUNOASSAY,DIAGNOSTIC          AVS given.  Pt expressed understanding of instructions

## 2022-09-13 NOTE — PROGRESS NOTES
Health Maintenance Due   Topic Date Due    DTaP/Tdap/Td series (1 - Tdap) Never done    Foot Exam Q1  09/21/2019    Pneumococcal 65+ years (3 - PPSV23 or PCV20) 10/03/2021    COVID-19 Vaccine (4 - Booster for Moderna series) 03/06/2022    MICROALBUMIN Q1  06/23/2022    Flu Vaccine (1) 09/01/2022     1. \"Have you been to the ER, urgent care clinic since your last visit? Hospitalized since your last visit? \"  No    2. \"Have you seen or consulted any other health care providers outside of the [address] [place] [address] since your last visit? \"  Yes. Dermatology      3. For patients aged 39-70: Has the patient had a colonoscopy / FIT/ Cologuard? Yes - Care Gap present. Most recent result on file      If the patient is female:    4. For patients aged 41-77: Has the patient had a mammogram within the past 2 years? NA - based on age or sex      11. For patients aged 21-65: Has the patient had a pap smear? NA - based on age or sex    Do you have an [address] in place in the event that you have a healthcare crisis that could impact your decision making as it pertains to your health? NO    Would you like information about Advance Care Planning? NO    Information given.  NO         Diabetic foot exam performed by Isa Flores LPN     Measurement  Response Nurse Comment Physician Comment   Monofilament  R - normal sensation with micro filament  L - normal sensation with micro filament     Pulse DP R - 2+ (normal)  L - 2+ (normal)     Pulse TP R - 2+ (normal)  L - 2+ (normal)     Structural deformity R - None  L - None     Skin Integrity / Deformity R - None  L - None        Reviewed by:

## 2022-09-13 NOTE — ADDENDUM NOTE
Addended by: Shahana Chase on: 9/13/2022 09:11 AM     Modules accepted: Orders, SmartSet Called Melissa Ivory [phone] per patient request to ask if he would be okay to pick him up her in the emergency department. Bella Morales is now on his way and informed patient that he would be here to pick him up.        Estelita Rojas RN  10/28/20 [id number]

## 2022-09-14 LAB
ALBUMIN SERPL-MCNC: 4 G/DL (ref 3.5–5)
ALBUMIN/GLOB SERPL: 1.4 {RATIO} (ref 1.1–2.2)
ALP SERPL-CCNC: 59 U/L (ref 45–117)
ALT SERPL-CCNC: 25 U/L (ref 12–78)
ANION GAP SERPL CALC-SCNC: 7 MMOL/L (ref 5–15)
AST SERPL-CCNC: 14 U/L (ref 15–37)
BASOPHILS # BLD: 0.1 K/UL (ref 0–0.1)
BASOPHILS NFR BLD: 1 % (ref 0–1)
BILIRUB SERPL-MCNC: 0.6 MG/DL (ref 0.2–1)
BUN SERPL-MCNC: 26 MG/DL (ref 6–20)
BUN/CREAT SERPL: 17 (ref 12–20)
CALCIUM SERPL-MCNC: 9.8 MG/DL (ref 8.5–10.1)
CHLORIDE SERPL-SCNC: 106 MMOL/L (ref 97–108)
CO2 SERPL-SCNC: 28 MMOL/L (ref 21–32)
CREAT SERPL-MCNC: 1.5 MG/DL (ref 0.7–1.3)
DIFFERENTIAL METHOD BLD: ABNORMAL
EOSINOPHIL # BLD: 0.3 K/UL (ref 0–0.4)
EOSINOPHIL NFR BLD: 4 % (ref 0–7)
ERYTHROCYTE [DISTWIDTH] IN BLOOD BY AUTOMATED COUNT: 14.7 % (ref 11.5–14.5)
GLOBULIN SER CALC-MCNC: 2.8 G/DL (ref 2–4)
GLUCOSE SERPL-MCNC: 94 MG/DL (ref 65–100)
HCT VFR BLD AUTO: 45 % (ref 36.6–50.3)
HGB BLD-MCNC: 13.9 G/DL (ref 12.1–17)
IMM GRANULOCYTES # BLD AUTO: 0.1 K/UL (ref 0–0.04)
IMM GRANULOCYTES NFR BLD AUTO: 1 % (ref 0–0.5)
LYMPHOCYTES # BLD: 1.4 K/UL (ref 0.8–3.5)
LYMPHOCYTES NFR BLD: 18 % (ref 12–49)
MCH RBC QN AUTO: 29.8 PG (ref 26–34)
MCHC RBC AUTO-ENTMCNC: 30.9 G/DL (ref 30–36.5)
MCV RBC AUTO: 96.4 FL (ref 80–99)
MONOCYTES # BLD: 0.8 K/UL (ref 0–1)
MONOCYTES NFR BLD: 11 % (ref 5–13)
NEUTS SEG # BLD: 5.1 K/UL (ref 1.8–8)
NEUTS SEG NFR BLD: 65 % (ref 32–75)
NRBC # BLD: 0 K/UL (ref 0–0.01)
NRBC BLD-RTO: 0 PER 100 WBC
PLATELET # BLD AUTO: 206 K/UL (ref 150–400)
PMV BLD AUTO: 10.6 FL (ref 8.9–12.9)
POTASSIUM SERPL-SCNC: 4.1 MMOL/L (ref 3.5–5.1)
PROT SERPL-MCNC: 6.8 G/DL (ref 6.4–8.2)
RBC # BLD AUTO: 4.67 M/UL (ref 4.1–5.7)
SODIUM SERPL-SCNC: 141 MMOL/L (ref 136–145)
URATE SERPL-MCNC: 5.5 MG/DL (ref 3.5–7.2)
WBC # BLD AUTO: 7.7 K/UL (ref 4.1–11.1)

## 2022-09-22 ENCOUNTER — VIRTUAL VISIT (OUTPATIENT)
Dept: FAMILY MEDICINE CLINIC | Age: 67
End: 2022-09-22
Payer: COMMERCIAL

## 2022-09-22 DIAGNOSIS — J06.9 URI WITH COUGH AND CONGESTION: Primary | ICD-10-CM

## 2022-09-22 DIAGNOSIS — E11.21 TYPE 2 DIABETES WITH NEPHROPATHY (HCC): ICD-10-CM

## 2022-09-22 LAB — HEMOCCULT STL QL IA: NEGATIVE

## 2022-09-22 PROCEDURE — 3044F HG A1C LEVEL LT 7.0%: CPT | Performed by: FAMILY MEDICINE

## 2022-09-22 PROCEDURE — 99214 OFFICE O/P EST MOD 30 MIN: CPT | Performed by: FAMILY MEDICINE

## 2022-09-22 PROCEDURE — 1123F ACP DISCUSS/DSCN MKR DOCD: CPT | Performed by: FAMILY MEDICINE

## 2022-09-22 RX ORDER — AZITHROMYCIN 250 MG/1
TABLET, FILM COATED ORAL
Qty: 6 TABLET | Refills: 0 | Status: SHIPPED | OUTPATIENT
Start: 2022-09-22 | End: 2022-09-27

## 2022-09-22 NOTE — PROGRESS NOTES
Identified pt with two pt identifiers(name and ). Reviewed record in preparation for visit and have obtained necessary documentation. Chief Complaint   Patient presents with    Follow-up        There were no vitals filed for this visit. Health Maintenance Due   Topic    COVID-19 Vaccine (4 - Booster for Moderna series)    DTaP/Tdap/Td series (1 - Tdap)       1. \"Have you been to the ER, urgent care clinic since your last visit? Hospitalized since your last visit? \" No    2. \"Have you seen or consulted any other health care providers outside of the [address] [place] [address] since your last visit? \" No     3. For patients over 45: Has the patient had a colonoscopy? Yes - no Care Gap present     If the patient is female:    4. For patients over 36: Has the patient had a mammogram? NA - based on age    11. For patients over 21: Has the patient had a pap smear? NA - based on age    Current Outpatient Medications   Medication Instructions    allopurinoL (ZYLOPRIM) 200 mg, Oral, DAILY    amLODIPine (NORVASC) 10 mg, Oral, DAILY    ammonium lactate (LAC-HYDRIN) 12 % lotion Apply to all skin on feet, ankles, and legs    clotrimazole-betamethasone (LOTRISONE) topical cream No dose, route, or frequency recorded.     diclofenac EC (VOLTAREN) 75 mg, Oral, 2 TIMES DAILY AS NEEDED    glucose blood VI test strips (OneTouch Ultra Test) strip USE TO CHECK BLOOD SUGAR TWICE A DAY    hydroCHLOROthiazide (HYDRODIURIL) 12.5 mg, Oral, DAILY    indomethacin (INDOCIN) 50 mg capsule Take 1 pill TID PO for duration of gout flare    Lancets misc Check blood glucose fasting and at bed time    lisinopriL (PRINIVIL, ZESTRIL) 10 mg, Oral, DAILY    metFORMIN (GLUCOPHAGE) 500 mg, Oral, 2 TIMES DAILY WITH MEALS    Nebulizer Accessories kit Nebulization, EVERY 6 HOURS AS NEEDED    sertraline (ZOLOFT) 25 mg, Oral, DAILY    triamcinolone acetonide (KENALOG) 0.1 % topical cream Topical, 2 TIMES DAILY, use thin layer       No Known Allergies    Immunization History   Administered Date(s) Administered    COVID-19, MODERNA BLUE border, Primary or Immunocompromised, (age 18y+), IM, 100 mcg/0.5mL 03/08/2021, 04/05/2021, 11/06/2021    Influenza High Dose Vaccine PF 10/21/2021    Influenza, AFLURIA (age 10-32 mo), IM, MDV, 0.25 mL, Fluzone (age 10 mo+), AFLURIA (age 1 y+), IM, MDV, 0.5mL 09/14/2019    Influenza, FLUAD, (age 72 y+), Adjuvanted 08/21/2020, 09/13/2022    Influenza, FLUARIX, FLULAVAL, FLUZONE (age 10 mo+) AND AFLURIA, (age 1 y+), PF, 0.5mL 09/21/2018    Pneumococcal Conjugate (PCV-13) 08/21/2020    Pneumococcal Polysaccharide (PPSV-23) 10/03/2016, 09/13/2022    Td 04/14/2022    Zoster Recombinant 03/15/2018, 06/27/2018    Zoster Vaccine, Live 11/22/2017       Past Medical History:   Diagnosis Date    Hypertension

## 2022-09-22 NOTE — PROGRESS NOTES
THIS VISIT WAS COMPLETED VIRTUALLY USING [facility]. [facility]  Sixto Avelar is a 79 y.o. male who presents for congestion, sore throat, swollen submandibular glands, hard to swallow. Left ear is a little full causing some disequilibrium. No cough. Using riccola  and Chloraseptic with great effect    PMHx:  Past Medical History:   Diagnosis Date    Hypertension        Meds:   Current Outpatient Medications   Medication Sig Dispense Refill    azithromycin (ZITHROMAX) 250 mg tablet Take 2 tablets today, then take 1 tablet daily 6 Tablet 0    ammonium lactate (LAC-HYDRIN) 12 % lotion Apply to all skin on feet, ankles, and legs      glucose blood VI test strips (OneTouch Ultra Test) strip USE TO CHECK BLOOD SUGAR TWICE A  Strip 11    metFORMIN (GLUCOPHAGE) 500 mg tablet Take 1 Tablet by mouth two (2) times daily (with meals). 180 Tablet 3    allopurinoL (ZYLOPRIM) 100 mg tablet Take 2 Tablets by mouth daily. 180 Tablet 3    sertraline (ZOLOFT) 25 mg tablet Take 1 Tablet by mouth daily. 90 Tablet 3    amLODIPine (NORVASC) 10 mg tablet Take 1 Tablet by mouth daily. 90 Tablet 3    hydroCHLOROthiazide (HYDRODIURIL) 12.5 mg tablet Take 1 Tablet by mouth daily. 90 Tablet 3    triamcinolone acetonide (KENALOG) 0.1 % topical cream Apply  to affected area two (2) times a day. use thin layer 80 g 3    lisinopriL (PRINIVIL, ZESTRIL) 10 mg tablet Take 1 Tablet by mouth daily. 90 Tablet 3    clotrimazole-betamethasone (LOTRISONE) topical cream       diclofenac EC (VOLTAREN) 75 mg EC tablet Take 1 Tab by mouth two (2) times daily as needed for Pain. 60 Tab 5    indomethacin (INDOCIN) 50 mg capsule Take 1 pill TID PO for duration of gout flare 90 Cap 0    Lancets misc Check blood glucose fasting and at bed time 200 Each 3    Nebulizer Accessories kit by Nebulization route every six (6) hours as needed for Cough.  1 Kit 0       Allergies:   No Known Allergies    Smoker:  Social History     Tobacco Use   Smoking Status Former Smokeless Tobacco Never       ETOH:   Social History     Substance and Sexual Activity   Alcohol Use Not Currently       FH:   Family History   Problem Relation Age of Onset    Cancer Mother     Other Father         Leukemia       ROS:   As listed in HPI. In addition:  Constitutional:   No headache, fever, fatigue, weight loss or weight gain      Cardiac:    No chest pain      Resp:   No cough or shortness of breath      Neuro   No loss of consciousness, dizziness, seizures      Physical Exam:  There were no vitals taken for this visit. GEN: No apparent distress. Alert and oriented and responds to all questions appropriately. NEUROLOGIC:  No focal neurologic deficits. Coordination and gait grossly intact. EXT: Well perfused. No edema. SKIN: No obvious rashes. Due to this being a TeleHealth evaluation, many elements of the physical examination are unable to be assessed. Assessment and Plan     URI  Likely viral.  Expect symptoms to last 7-10 days  He would like to try an antibiotic. Azithromycin called in  NSAIDs would be best avoided with CKD 3  Tylenol okay  Honey and warm water for sore throat  Nasal steroid for decongestant    Diabetes  Recently had diabetic follow-up. He is concerned because his blood sugar is a little elevated for his illness. Provided reassurance that this is normal when you are sick and expected to come down when feeling better      ICD-10-CM ICD-9-CM    1. URI with cough and congestion  J06.9 465.9       2. [facility]  E11.21 250.40      583.81             Pursuant to the emergency declaration under the [facility], [facility] waiver authority and the [employer] Act, this Virtual  Visit was conducted, with patient's consent, to reduce the patient's risk of exposure to COVID-19 and provide continuity of care for an established patient.      Services were provided through a video synchronous discussion virtually to substitute for in-person clinic visit.

## 2022-11-10 DIAGNOSIS — I10 ESSENTIAL HYPERTENSION: ICD-10-CM

## 2022-11-10 RX ORDER — LISINOPRIL 10 MG/1
10 TABLET ORAL DAILY
Qty: 90 TABLET | Refills: 3 | Status: SHIPPED | OUTPATIENT
Start: 2022-11-10 | End: 2022-11-11 | Stop reason: SDUPTHER

## 2022-11-10 RX ORDER — AMLODIPINE BESYLATE 10 MG/1
10 TABLET ORAL DAILY
Qty: 90 TABLET | Refills: 3 | Status: SHIPPED | OUTPATIENT
Start: 2022-11-10 | End: 2022-11-11 | Stop reason: SDUPTHER

## 2022-11-10 NOTE — TELEPHONE ENCOUNTER
Refill  Request ([employer] sent a fax)     Requested Prescriptions     Pending Prescriptions Disp Refills    amLODIPine (NORVASC) 10 mg tablet 90 Tablet 3     Sig: Take 1 Tablet by mouth daily. lisinopriL (PRINIVIL, ZESTRIL) 10 mg tablet 90 Tablet 3     Sig: Take 1 Tablet by mouth daily.            Thank You

## 2022-11-11 DIAGNOSIS — I10 ESSENTIAL HYPERTENSION: ICD-10-CM

## 2022-11-11 RX ORDER — LISINOPRIL 10 MG/1
10 TABLET ORAL DAILY
Qty: 90 TABLET | Refills: 3 | Status: SHIPPED | OUTPATIENT
Start: 2022-11-11

## 2022-11-11 RX ORDER — AMLODIPINE BESYLATE 10 MG/1
10 TABLET ORAL DAILY
Qty: 90 TABLET | Refills: 3 | Status: SHIPPED | OUTPATIENT
Start: 2022-11-11

## 2022-12-13 ENCOUNTER — DOCUMENTATION ONLY (OUTPATIENT)
Dept: FAMILY MEDICINE CLINIC | Age: 67
End: 2022-12-13

## 2022-12-13 NOTE — PROGRESS NOTES
verified. Pt confirmed documents recv'd via fax. Forms faxed to [phone] and confirmation page recv'd.

## 2023-01-13 DIAGNOSIS — I10 ESSENTIAL HYPERTENSION: ICD-10-CM

## 2023-01-13 RX ORDER — HYDROCHLOROTHIAZIDE 12.5 MG/1
12.5 TABLET ORAL DAILY
Qty: 90 TABLET | Refills: 3 | Status: SHIPPED | OUTPATIENT
Start: 2023-01-13

## 2023-01-13 NOTE — TELEPHONE ENCOUNTER
Refill Request ([facility] sent fax)    Requested Prescriptions     Pending Prescriptions Disp Refills    hydroCHLOROthiazide (HYDRODIURIL) 12.5 mg tablet 90 Tablet 3     Sig: Take 1 Tablet by mouth daily.        Thank You

## 2023-02-07 DIAGNOSIS — F32.A DEPRESSION, UNSPECIFIED DEPRESSION TYPE: ICD-10-CM

## 2023-02-07 DIAGNOSIS — F33.0 MILD EPISODE OF RECURRENT MAJOR DEPRESSIVE DISORDER (HCC): ICD-10-CM

## 2023-02-07 RX ORDER — SERTRALINE HYDROCHLORIDE 25 MG/1
TABLET, FILM COATED ORAL
Qty: 90 TABLET | Refills: 3 | Status: SHIPPED | OUTPATIENT
Start: 2023-02-07

## 2023-03-07 ENCOUNTER — OFFICE VISIT (OUTPATIENT)
Dept: FAMILY MEDICINE CLINIC | Age: 68
End: 2023-03-07

## 2023-03-07 VITALS
HEIGHT: 65 IN | HEART RATE: 52 BPM | RESPIRATION RATE: 16 BRPM | OXYGEN SATURATION: 93 % | DIASTOLIC BLOOD PRESSURE: 73 MMHG | SYSTOLIC BLOOD PRESSURE: 110 MMHG | BODY MASS INDEX: 37.29 KG/M2 | WEIGHT: 223.8 LBS

## 2023-03-07 DIAGNOSIS — E11.21 TYPE 2 DIABETES WITH NEPHROPATHY (HCC): Primary | ICD-10-CM

## 2023-03-07 DIAGNOSIS — E66.01 OBESITY, MORBID (HCC): ICD-10-CM

## 2023-03-07 DIAGNOSIS — I10 ESSENTIAL HYPERTENSION: ICD-10-CM

## 2023-03-07 DIAGNOSIS — N18.30 STAGE 3 CHRONIC KIDNEY DISEASE, UNSPECIFIED WHETHER STAGE 3A OR 3B CKD (HCC): ICD-10-CM

## 2023-03-07 DIAGNOSIS — E66.01 SEVERE OBESITY (BMI 35.0-39.9) WITH COMORBIDITY (HCC): ICD-10-CM

## 2023-03-07 LAB — HBA1C MFR BLD HPLC: 5.9 %

## 2023-03-07 NOTE — PROGRESS NOTES
Chief Complaint   Patient presents with    Diabetes     Pt would like to discuss kidney function (saw something in his pre check in)  Pt has questions about what HR should be during exercise. Health Maintenance Due   Topic Date Due    COVID-19 Vaccine (4 - Booster for Moderna series) 01/01/2022    DTaP/Tdap/Td series (1 - Tdap) 04/15/2022    Lipid Screen  12/27/2022       1. \"Have you been to the ER, urgent care clinic since your last visit? Hospitalized since your last visit? \" No    2. \"Have you seen or consulted any other health care providers outside of the [address] [place] [address] since your last visit? \" No     3. For patients aged 39-70: Has the patient had a colonoscopy / FIT/ Cologuard? Yes - no Care Gap present FIT kit       If the patient is female:    4. For patients aged 41-77: Has the patient had a mammogram within the past 2 years? NA - based on age or sex      11. For patients aged 21-65: Has the patient had a pap smear?  NA - based on age or sex

## 2023-03-07 NOTE — PROGRESS NOTES
JOON  Natalee Mock is a 79 y.o. male who presents to follow-up on diabetes and hypertension. He did some research and CKD 3 and this scared him. From his research he felt like this conferred a poor prognosis. We discussed that treatment of CKD 3 is excellent control of blood pressure, excellent control blood sugar and avoidance of medications that can aid your kidneys faster like NSAIDs. We do not have a good sense of prognosis/life expectancy at his level of kidney function. He currently has  GFR 45-60 with proteinuria    He is going to the gym 6 days out of the week. Doing cardio    PMHx:  Past Medical History:   Diagnosis Date    Hypertension        Meds:   Current Outpatient Medications   Medication Sig Dispense Refill    sertraline (ZOLOFT) 25 mg tablet TAKE ONE TABLET BY MOUTH ONE TIME DAILY 90 Tablet 3    hydroCHLOROthiazide (HYDRODIURIL) 12.5 mg tablet Take 1 Tablet by mouth daily. 90 Tablet 3    metFORMIN (GLUCOPHAGE) 500 mg tablet Take 2 Tablets by mouth two (2) times daily (with meals). 360 Tablet 3    amLODIPine (NORVASC) 10 mg tablet Take 1 Tablet by mouth daily. 90 Tablet 3    lisinopriL (PRINIVIL, ZESTRIL) 10 mg tablet Take 1 Tablet by mouth daily. 90 Tablet 3    glucose blood VI test strips (OneTouch Ultra Test) strip USE TO CHECK BLOOD SUGAR TWICE A  Strip 11    allopurinoL (ZYLOPRIM) 100 mg tablet Take 2 Tablets by mouth daily. 180 Tablet 3    triamcinolone acetonide (KENALOG) 0.1 % topical cream Apply  to affected area two (2) times a day. use thin layer 80 g 3    clotrimazole-betamethasone (LOTRISONE) topical cream       diclofenac EC (VOLTAREN) 75 mg EC tablet Take 1 Tab by mouth two (2) times daily as needed for Pain. 60 Tab 5    indomethacin (INDOCIN) 50 mg capsule Take 1 pill TID PO for duration of gout flare 90 Cap 0    Lancets misc Check blood glucose fasting and at bed time 200 Each 3    Nebulizer Accessories kit by Nebulization route every six (6) hours as needed for Cough.  1 Kit 0       Allergies:   No Known Allergies    Smoker:  Social History     Tobacco Use   Smoking Status Former   Smokeless Tobacco Never       ETOH:   Social History     Substance and Sexual Activity   Alcohol Use Yes    Comment: socailly       FH:   Family History   Problem Relation Age of Onset    Cancer Mother     Other Father         Leukemia       ROS:   As listed in HPI. In addition:  Constitutional:   No headache, fever, fatigue, weight loss or weight gain      Cardiac:    No chest pain      Resp:   No cough or shortness of breath      Neuro   No loss of consciousness, dizziness, seizures      Physical Exam:  Blood pressure 110/73, pulse (!) 52, resp. rate 16, height 5' 5\" (1.651 m), weight 223 lb 12.8 oz (101.5 kg), SpO2 93 %. GEN: No apparent distress. Alert and oriented and responds to all questions appropriately. NEUROLOGIC:  No focal neurologic deficits. Strength and sensation grossly intact. Coordination and gait grossly intact. EXT: Well perfused. No edema. SKIN: No obvious rashes. Lungs clear to auscultation bilaterally  CV regular rate rhythm no murmur  HEENT Dibent membrane the right is clear, tympanic membrane left is obscured by thick impacted cerumen. This was pushed down by scoop but little bit too hard to remove. It did improve his hearing to shift the cerumen       Assessment and Plan     Wellness is done in September  Surveillance labs  FIT kit in September  Declines PSA screen \"maybe next time\"    History of gout  No recent flares  Allopurinol. Diabetes  A1c 5.8 stable from previous  Metformin 500 mg twice daily  Offered statin. He feels like his cholesterol is good enough    CKD 3  Proteinuria  Lisinopril  Monitor  Avoid NSAIDs. Hypertension well-controlled  Amlodipine 10 mg  Hydrochlorothiazide 12.5  Lisinopril 10      ICD-10-CM ICD-9-CM    1. Type 2 diabetes with nephropathy (HCC)  E11.21 250.40 AMB POC HEMOGLOBIN A1C     583.81       2.  Essential hypertension  I10 401.9 3. Severe obesity (BMI 35.0-39. 9) with comorbidity ([facility]  E66.01 278.01       4. Stage 3 chronic kidney disease, unspecified whether stage 3a or 3b CKD (HCC)  N18.30 585.3       5. Obesity, morbid ([facility]  E66.01 278.01           AVS given.  Pt expressed understanding of instructions

## 2023-03-08 LAB
ALBUMIN SERPL-MCNC: 4.3 G/DL (ref 3.5–5)
ALBUMIN/GLOB SERPL: 1.4 (ref 1.1–2.2)
ALP SERPL-CCNC: 59 U/L (ref 45–117)
ALT SERPL-CCNC: 26 U/L (ref 12–78)
ANION GAP SERPL CALC-SCNC: 7 MMOL/L (ref 5–15)
AST SERPL-CCNC: 19 U/L (ref 15–37)
BASOPHILS # BLD: 0.3 K/UL (ref 0–0.1)
BASOPHILS NFR BLD: 4 % (ref 0–1)
BILIRUB SERPL-MCNC: 0.7 MG/DL (ref 0.2–1)
BUN SERPL-MCNC: 36 MG/DL (ref 6–20)
BUN/CREAT SERPL: 21 (ref 12–20)
CALCIUM SERPL-MCNC: 9.8 MG/DL (ref 8.5–10.1)
CHLORIDE SERPL-SCNC: 105 MMOL/L (ref 97–108)
CHOLEST SERPL-MCNC: 162 MG/DL
CO2 SERPL-SCNC: 29 MMOL/L (ref 21–32)
CREAT SERPL-MCNC: 1.69 MG/DL (ref 0.7–1.3)
DIFFERENTIAL METHOD BLD: ABNORMAL
EOSINOPHIL # BLD: 0.9 K/UL (ref 0–0.4)
EOSINOPHIL NFR BLD: 10 % (ref 0–7)
ERYTHROCYTE [DISTWIDTH] IN BLOOD BY AUTOMATED COUNT: 14 % (ref 11.5–14.5)
GLOBULIN SER CALC-MCNC: 3.1 G/DL (ref 2–4)
GLUCOSE SERPL-MCNC: 84 MG/DL (ref 65–100)
HCT VFR BLD AUTO: 44.9 % (ref 36.6–50.3)
HDLC SERPL-MCNC: 40 MG/DL
HDLC SERPL: 4.1 (ref 0–5)
HGB BLD-MCNC: 13.6 G/DL (ref 12.1–17)
IMM GRANULOCYTES # BLD AUTO: 0.1 K/UL (ref 0–0.04)
IMM GRANULOCYTES NFR BLD AUTO: 1 % (ref 0–0.5)
LDLC SERPL CALC-MCNC: 79.6 MG/DL (ref 0–100)
LYMPHOCYTES # BLD: 1.7 K/UL (ref 0.8–3.5)
LYMPHOCYTES NFR BLD: 20 % (ref 12–49)
MCH RBC QN AUTO: 28.9 PG (ref 26–34)
MCHC RBC AUTO-ENTMCNC: 30.3 G/DL (ref 30–36.5)
MCV RBC AUTO: 95.3 FL (ref 80–99)
MONOCYTES # BLD: 1.1 K/UL (ref 0–1)
MONOCYTES NFR BLD: 13 % (ref 5–13)
NEUTS SEG # BLD: 4.6 K/UL (ref 1.8–8)
NEUTS SEG NFR BLD: 52 % (ref 32–75)
NRBC # BLD: 0 K/UL (ref 0–0.01)
NRBC BLD-RTO: 0 PER 100 WBC
PLATELET # BLD AUTO: 235 K/UL (ref 150–400)
PMV BLD AUTO: 10.5 FL (ref 8.9–12.9)
POTASSIUM SERPL-SCNC: 4.4 MMOL/L (ref 3.5–5.1)
PROT SERPL-MCNC: 7.4 G/DL (ref 6.4–8.2)
RBC # BLD AUTO: 4.71 M/UL (ref 4.1–5.7)
RBC MORPH BLD: ABNORMAL
SODIUM SERPL-SCNC: 141 MMOL/L (ref 136–145)
TRIGL SERPL-MCNC: 212 MG/DL (ref ?–150)
VLDLC SERPL CALC-MCNC: 42.4 MG/DL
WBC # BLD AUTO: 8.7 K/UL (ref 4.1–11.1)

## 2023-03-27 ENCOUNTER — TELEPHONE (OUTPATIENT)
Dept: FAMILY MEDICINE CLINIC | Age: 68
End: 2023-03-27

## 2023-03-27 DIAGNOSIS — E11.21 TYPE 2 DIABETES WITH NEPHROPATHY (HCC): Primary | ICD-10-CM

## 2023-03-27 NOTE — TELEPHONE ENCOUNTER
PT is calling to discuss his meds. He tried to put in a request online but could not find it. He wouldn't tell me what RX it was either.      Pt requests to speak to a nurse

## 2023-03-28 RX ORDER — DULAGLUTIDE 1.5 MG/.5ML
1.5 INJECTION, SOLUTION SUBCUTANEOUS
Qty: 12 EACH | Refills: 3 | Status: SHIPPED | OUTPATIENT
Start: 2023-03-28

## 2023-03-28 NOTE — TELEPHONE ENCOUNTER
verified. Pt had sent message regarding Trulicity medication and increasing the dose. Pt is needing this refilled. (Sent through my chart). Pt also very upset with our phone systems. Pt states it is hard to get through to our practice. Informed pt we do have a call center that sometimes patient will get first before reaching the practice.

## 2023-04-20 DIAGNOSIS — Z87.39 HISTORY OF GOUT: ICD-10-CM

## 2023-04-20 NOTE — TELEPHONE ENCOUNTER
Last OV: 3/7/23    Refills have been requested for the following medications:         allopurinoL (ZYLOPRIM) 100 mg tablet [Isaías Thomas]       Patient Comment: Need asap, as im going out if town for a week, leaving Sunday     Preferred pharmacy: [facility]

## 2023-04-21 RX ORDER — ALLOPURINOL 100 MG/1
TABLET ORAL
Qty: 180 TABLET | Refills: 3 | Status: SHIPPED | OUTPATIENT
Start: 2023-04-21

## 2023-04-21 RX ORDER — ALLOPURINOL 100 MG/1
200 TABLET ORAL DAILY
Qty: 180 TABLET | Refills: 3 | Status: SHIPPED | OUTPATIENT
Start: 2023-04-21

## 2023-04-21 NOTE — TELEPHONE ENCOUNTER
Pt is calling stating he is going out of town and he needs this filled this morning it is important that this is taken care of    Requested Prescriptions     Pending Prescriptions Disp Refills    allopurinoL (ZYLOPRIM) 100 mg tablet 180 Tablet 3     Sig: Take 2 Tablets by mouth daily.

## 2023-04-25 ENCOUNTER — DOCUMENTATION ONLY (OUTPATIENT)
Dept: FAMILY MEDICINE CLINIC | Age: 68
End: 2023-04-25

## 2023-04-25 NOTE — PROGRESS NOTES
[facility] called [phone] PA completed for Trulicity 1.5 DV/6.4 ml pen-injectors,with PA rep Gerald REIS Approved:Effective 3/26/23 thru 4/24/24. Approval called to [facility] pharmacy,Alejandro stated it went thru.

## 2023-06-01 ENCOUNTER — TELEMEDICINE (OUTPATIENT)
Age: 68
End: 2023-06-01
Payer: COMMERCIAL

## 2023-06-01 DIAGNOSIS — M25.511 ACUTE PAIN OF RIGHT SHOULDER: ICD-10-CM

## 2023-06-01 DIAGNOSIS — M75.101 ROTATOR CUFF SYNDROME OF RIGHT SHOULDER: Primary | ICD-10-CM

## 2023-06-01 PROCEDURE — 99213 OFFICE O/P EST LOW 20 MIN: CPT | Performed by: FAMILY MEDICINE

## 2023-06-01 PROCEDURE — 1123F ACP DISCUSS/DSCN MKR DOCD: CPT | Performed by: FAMILY MEDICINE

## 2023-06-01 RX ORDER — DULAGLUTIDE 1.5 MG/.5ML
INJECTION, SOLUTION SUBCUTANEOUS
COMMUNITY
Start: 2023-05-21

## 2023-06-01 ASSESSMENT — PATIENT HEALTH QUESTIONNAIRE - PHQ9
1. LITTLE INTEREST OR PLEASURE IN DOING THINGS: 0
2. FEELING DOWN, DEPRESSED OR HOPELESS: 0
SUM OF ALL RESPONSES TO PHQ QUESTIONS 1-9: 0
SUM OF ALL RESPONSES TO PHQ9 QUESTIONS 1 & 2: 0
SUM OF ALL RESPONSES TO PHQ QUESTIONS 1-9: 0

## 2023-06-01 NOTE — PROGRESS NOTES
THIS VISIT WAS COMPLETED VIRTUALLY USING [facility]    HPI  Bryan Meeks is a 76 y.o. male who presents with right shoulder pain x3 weeks. Noticed this prior to going on vacation. It was okay during the day but quite uncomfortable by the evening and painful at night, made it difficult to sleep on his side. Points to the distal end of the clavicle, reports that there is an uncomfortable knot there. There is a tingling going down the lateral side of the arm at times. It is somewhat uncomfortable to lift his arm above his head. Advil was helpful. With rest that appears to be getting better on its own. More of a nagging issue at this point. He has been going to the gym and doing some over the head exercises.   Does not do heavy repetitions    PMHx:  Past Medical History:   Diagnosis Date    Hypertension        Meds:   Current Outpatient Medications   Medication Sig Dispense Refill    Lancets MISC Check blood glucose fasting and at bed time      allopurinol (ZYLOPRIM) 100 MG tablet Take 2 tablets by mouth daily      amLODIPine (NORVASC) 10 MG tablet Take 1 tablet by mouth daily      clotrimazole-betamethasone (LOTRISONE) 1-0.05 % cream ceived the following from [facility]: Outside name: clotrimazole-betamethasone (LOTRISONE) topical cream      diclofenac (VOLTAREN) 75 MG EC tablet Take 1 tablet by mouth 2 times daily as needed      hydroCHLOROthiazide (HYDRODIURIL) 12.5 MG tablet Take 1 tablet by mouth daily      indomethacin (INDOCIN) 50 MG capsule Take 1 pill TID PO for duration of gout flare      lisinopril (PRINIVIL;ZESTRIL) 10 MG tablet Take 1 tablet by mouth daily      metFORMIN (GLUCOPHAGE) 500 MG tablet Take 2 tablets by mouth 2 times daily (with meals)      sertraline (ZOLOFT) 25 MG tablet TAKE ONE TABLET BY MOUTH ONE TIME DAILY      triamcinolone (KENALOG) 0.1 % cream Apply topically 2 times daily      TRULICITY 1.5 YW/7.1MW SC injection        No current

## 2023-06-01 NOTE — PROGRESS NOTES
Chief Complaint   Patient presents with    Shoulder Pain     DENIES INJURY         Health Maintenance Due   Topic Date Due    AAA screen  05/17/2020    COVID-19 Vaccine (4 - Booster for Moderna series) 01/01/2022    DTaP/Tdap/Td vaccine (1 - Tdap) 04/15/2022           1. \"Have you been to the ER, urgent care clinic since your last visit? Hospitalized since your last visit? \" No    2. \"Have you seen or consulted any other health care providers outside of the [address] [place] [address] since your last visit? \" No     3. For patients aged 39-70: Has the patient had a colonoscopy / FIT/ Cologuard? Yes - Care Gap present. Most recent result on file      If the patient is female:    4. For patients aged 41-77: Has the patient had a mammogram within the past 2 years? NA - based on age or sex      11. For patients aged 21-65: Has the patient had a pap smear?  NA - based on age or sex

## 2023-06-21 ENCOUNTER — TELEPHONE (OUTPATIENT)
Age: 68
End: 2023-06-21

## 2023-06-21 DIAGNOSIS — J06.9 URI WITH COUGH AND CONGESTION: ICD-10-CM

## 2023-06-21 RX ORDER — GUAIFENESIN AND CODEINE PHOSPHATE 100; 10 MG/5ML; MG/5ML
5 SOLUTION ORAL 2 TIMES DAILY PRN
Qty: 180 ML | Refills: 0 | Status: SHIPPED | OUTPATIENT
Start: 2023-06-21 | End: 2023-07-05

## 2023-06-21 RX ORDER — AMOXICILLIN 500 MG/1
500 CAPSULE ORAL 2 TIMES DAILY
Qty: 14 CAPSULE | Refills: 0 | Status: SHIPPED | OUTPATIENT
Start: 2023-06-21 | End: 2023-06-28

## 2023-06-21 RX ORDER — BENZONATATE 200 MG/1
200 CAPSULE ORAL 3 TIMES DAILY PRN
Qty: 30 CAPSULE | Refills: 0 | Status: SHIPPED | OUTPATIENT
Start: 2023-06-21 | End: 2023-07-01

## 2023-06-21 NOTE — TELEPHONE ENCOUNTER
Pt is calling stating he still has the cough and he needs a refill on med and wants a call when this is done    Guaifenesin-codeine syrup  Amoxicillin  Benzonatate

## 2023-08-21 ENCOUNTER — TELEPHONE (OUTPATIENT)
Age: 68
End: 2023-08-21

## 2023-08-28 ENCOUNTER — TELEPHONE (OUTPATIENT)
Age: 68
End: 2023-08-28

## 2023-08-28 NOTE — TELEPHONE ENCOUNTER
Pt is calling stating he was packing up and moving boxes and he has messed up his left shoulder and he is wanting you to give him a referral to [facility] . Wants this done soon because he is on their waiting list. Please call pt when this is done.

## 2023-08-29 DIAGNOSIS — E11.21 TYPE 2 DIABETES MELLITUS WITH DIABETIC NEPHROPATHY, WITHOUT LONG-TERM CURRENT USE OF INSULIN (HCC): Primary | ICD-10-CM

## 2023-08-29 RX ORDER — SERTRALINE HYDROCHLORIDE 25 MG/1
25 TABLET, FILM COATED ORAL DAILY
Qty: 90 TABLET | Refills: 3 | Status: SHIPPED | OUTPATIENT
Start: 2023-08-29

## 2023-08-29 RX ORDER — AMLODIPINE BESYLATE 10 MG/1
10 TABLET ORAL DAILY
Qty: 90 TABLET | Refills: 3 | Status: SHIPPED | OUTPATIENT
Start: 2023-08-29

## 2023-08-29 RX ORDER — TRIAMCINOLONE ACETONIDE 1 MG/G
CREAM TOPICAL
Qty: 60 G | Refills: 3 | Status: SHIPPED | OUTPATIENT
Start: 2023-08-29 | End: 2023-08-30 | Stop reason: SDUPTHER

## 2023-08-29 RX ORDER — ALLOPURINOL 100 MG/1
200 TABLET ORAL DAILY
Qty: 180 TABLET | Refills: 3 | Status: SHIPPED | OUTPATIENT
Start: 2023-08-29

## 2023-08-29 RX ORDER — HYDROCHLOROTHIAZIDE 12.5 MG/1
12.5 TABLET ORAL DAILY
Qty: 90 TABLET | Refills: 3 | Status: SHIPPED | OUTPATIENT
Start: 2023-08-29

## 2023-08-29 RX ORDER — LISINOPRIL 10 MG/1
10 TABLET ORAL DAILY
Qty: 90 TABLET | Refills: 3 | Status: SHIPPED | OUTPATIENT
Start: 2023-08-29

## 2023-08-29 RX ORDER — DULAGLUTIDE 1.5 MG/.5ML
1.5 INJECTION, SOLUTION SUBCUTANEOUS WEEKLY
Qty: 6 ML | Refills: 3 | Status: SHIPPED | OUTPATIENT
Start: 2023-08-29

## 2023-08-31 RX ORDER — TRIAMCINOLONE ACETONIDE 1 MG/G
CREAM TOPICAL
Qty: 60 G | Refills: 3 | Status: SHIPPED | OUTPATIENT
Start: 2023-08-31

## 2023-09-29 ENCOUNTER — TELEPHONE (OUTPATIENT)
Age: 68
End: 2023-09-29

## 2023-09-29 NOTE — TELEPHONE ENCOUNTER
Ppt is calling in referance to his [employer] message that he sent; see previous mychart note; pt was very frustrated and very rude to me while I explained to the pt that if he called the billing dept as the billing dept advised to the pt that they would look into it and try to resubmit the bill that he recieced for $638.00; pt stated that he need an explanation from myself or the office manage about why his VV was that high; pt states that if he comes in and if the  requested for the full amount of the bill that billed that he was not going to pay it; the pt continued to talk over me and wanted me to argue back with him; please call pt back; bill printed out and placed on Office Cor desk for review    Thank You

## 2023-10-03 ENCOUNTER — OFFICE VISIT (OUTPATIENT)
Age: 68
End: 2023-10-03
Payer: COMMERCIAL

## 2023-10-03 VITALS
HEIGHT: 65 IN | TEMPERATURE: 98.3 F | BODY MASS INDEX: 34.89 KG/M2 | HEART RATE: 90 BPM | SYSTOLIC BLOOD PRESSURE: 127 MMHG | DIASTOLIC BLOOD PRESSURE: 88 MMHG | OXYGEN SATURATION: 96 % | RESPIRATION RATE: 17 BRPM | WEIGHT: 209.4 LBS

## 2023-10-03 DIAGNOSIS — N18.31 STAGE 3A CHRONIC KIDNEY DISEASE (HCC): ICD-10-CM

## 2023-10-03 DIAGNOSIS — L57.8 SUN-DAMAGED SKIN: ICD-10-CM

## 2023-10-03 DIAGNOSIS — E55.9 VITAMIN D DEFICIENCY: ICD-10-CM

## 2023-10-03 DIAGNOSIS — E53.8 B12 DEFICIENCY: ICD-10-CM

## 2023-10-03 DIAGNOSIS — I10 ESSENTIAL (PRIMARY) HYPERTENSION: ICD-10-CM

## 2023-10-03 DIAGNOSIS — Z12.11 COLON CANCER SCREENING: ICD-10-CM

## 2023-10-03 DIAGNOSIS — E11.21 TYPE 2 DIABETES MELLITUS WITH DIABETIC NEPHROPATHY, WITHOUT LONG-TERM CURRENT USE OF INSULIN (HCC): ICD-10-CM

## 2023-10-03 DIAGNOSIS — Z00.00 ROUTINE GENERAL MEDICAL EXAMINATION AT A HEALTH CARE FACILITY: Primary | ICD-10-CM

## 2023-10-03 PROCEDURE — 3074F SYST BP LT 130 MM HG: CPT | Performed by: FAMILY MEDICINE

## 2023-10-03 PROCEDURE — 99397 PER PM REEVAL EST PAT 65+ YR: CPT | Performed by: FAMILY MEDICINE

## 2023-10-03 PROCEDURE — 3079F DIAST BP 80-89 MM HG: CPT | Performed by: FAMILY MEDICINE

## 2023-10-03 RX ORDER — BLOOD SUGAR DIAGNOSTIC
STRIP MISCELLANEOUS
COMMUNITY
Start: 2023-07-27

## 2023-10-03 SDOH — ECONOMIC STABILITY: FOOD INSECURITY: WITHIN THE PAST 12 MONTHS, THE FOOD YOU BOUGHT JUST DIDN'T LAST AND YOU DIDN'T HAVE MONEY TO GET MORE.: NEVER TRUE

## 2023-10-03 SDOH — ECONOMIC STABILITY: HOUSING INSECURITY
IN THE LAST 12 MONTHS, WAS THERE A TIME WHEN YOU DID NOT HAVE A STEADY PLACE TO SLEEP OR SLEPT IN A SHELTER (INCLUDING NOW)?: NO

## 2023-10-03 SDOH — ECONOMIC STABILITY: INCOME INSECURITY: HOW HARD IS IT FOR YOU TO PAY FOR THE VERY BASICS LIKE FOOD, HOUSING, MEDICAL CARE, AND HEATING?: NOT HARD AT ALL

## 2023-10-03 SDOH — ECONOMIC STABILITY: FOOD INSECURITY: WITHIN THE PAST 12 MONTHS, YOU WORRIED THAT YOUR FOOD WOULD RUN OUT BEFORE YOU GOT MONEY TO BUY MORE.: NEVER TRUE

## 2023-10-03 NOTE — PROGRESS NOTES
Chief Complaint   Patient presents with    6 Month Follow-Up         Health Maintenance Due   Topic Date Due    Hepatitis B vaccine (1 of 3 - Risk 3-dose series) Never done    AAA screen  05/17/2020    COVID-19 Vaccine (4 - Moderna series) 01/01/2022    DTaP/Tdap/Td vaccine (1 - Tdap) 04/15/2022    Diabetic retinal exam  07/25/2023    Flu vaccine (1) 08/01/2023    Diabetic foot exam  09/13/2023    Diabetic Alb to Cr ratio (uACR) test  09/13/2023    Colorectal Cancer Screen  09/15/2023           1. \"Have you been to the ER, urgent care clinic since your last visit? Hospitalized since your last visit? \"  Yes. [facility]    2. \"Have you seen or consulted any other health care providers outside of the [address] [place] [address] since your last visit? \"  Yes. PT      3. For patients aged 43-73: Has the patient had a colonoscopy / FIT/ Cologuard? No      If the patient is female:    4. For patients aged 43-66: Has the patient had a mammogram within the past 2 years? NA - based on age or sex      11. For patients aged 21-65: Has the patient had a pap smear?  NA - based on age or sex

## 2023-10-03 NOTE — PROGRESS NOTES
HPI  Lance Zayas is a 76 y.o. male who presents to follow-up on chronic medical issues. PMHx:  Past Medical History:   Diagnosis Date    Hypertension        Meds:   Current Outpatient Medications   Medication Sig Dispense Refill    ONETOUCH ULTRA strip       APPLE CIDER VINEGAR PO Take by mouth daily      triamcinolone (KENALOG) 0.1 % cream Apply topically 2 times daily. 60 g 3    TRULICITY 1.5 EK/8.3XL SC injection Inject 0.5 mLs into the skin once a week 6 mL 3    sertraline (ZOLOFT) 25 MG tablet Take 1 tablet by mouth daily 90 tablet 3    lisinopril (PRINIVIL;ZESTRIL) 10 MG tablet Take 1 tablet by mouth daily 90 tablet 3    metFORMIN (GLUCOPHAGE) 500 MG tablet Take 2 tablets by mouth 2 times daily (with meals) 360 tablet 3    amLODIPine (NORVASC) 10 MG tablet Take 1 tablet by mouth daily 90 tablet 3    allopurinol (ZYLOPRIM) 100 MG tablet Take 2 tablets by mouth daily 180 tablet 3    hydroCHLOROthiazide (HYDRODIURIL) 12.5 MG tablet Take 1 tablet by mouth daily 90 tablet 3    Lancets MISC Check blood glucose fasting and at bed time      clotrimazole-betamethasone (LOTRISONE) 1-0.05 % cream ceived the following from [facility]: Outside name: clotrimazole-betamethasone (LOTRISONE) topical cream      diclofenac (VOLTAREN) 75 MG EC tablet Take 1 tablet by mouth 2 times daily as needed      indomethacin (INDOCIN) 50 MG capsule Take 1 pill TID PO for duration of gout flare       No current facility-administered medications for this visit. Allergies:   No Known Allergies    Smoker:  Social History     Tobacco Use   Smoking Status Former   Smokeless Tobacco Never       ETOH:   Social History     Substance and Sexual Activity   Alcohol Use Not Currently       FH:   Family History   Problem Relation Age of Onset    Other Father         Leukemia    Cancer Mother        ROS:   As listed in HPI.  In addition:  Constitutional:   No headache, fever, fatigue, weight loss or weight gain      Cardiac:

## 2023-10-04 ENCOUNTER — TELEPHONE (OUTPATIENT)
Age: 68
End: 2023-10-04

## 2023-10-04 LAB
25(OH)D3 SERPL-MCNC: 47.4 NG/ML (ref 30–100)
ALBUMIN SERPL-MCNC: 4.3 G/DL (ref 3.5–5)
ALBUMIN/GLOB SERPL: 1.4 (ref 1.1–2.2)
ALP SERPL-CCNC: 65 U/L (ref 45–117)
ALT SERPL-CCNC: 25 U/L (ref 12–78)
ANION GAP SERPL CALC-SCNC: 3 MMOL/L (ref 5–15)
AST SERPL-CCNC: 14 U/L (ref 15–37)
BASOPHILS # BLD: 0.1 K/UL (ref 0–0.1)
BASOPHILS NFR BLD: 1 % (ref 0–1)
BILIRUB SERPL-MCNC: 0.6 MG/DL (ref 0.2–1)
BUN SERPL-MCNC: 28 MG/DL (ref 6–20)
BUN/CREAT SERPL: 19 (ref 12–20)
CALCIUM SERPL-MCNC: 9.8 MG/DL (ref 8.5–10.1)
CHLORIDE SERPL-SCNC: 107 MMOL/L (ref 97–108)
CHOLEST SERPL-MCNC: 140 MG/DL
CO2 SERPL-SCNC: 31 MMOL/L (ref 21–32)
CREAT SERPL-MCNC: 1.5 MG/DL (ref 0.7–1.3)
DIFFERENTIAL METHOD BLD: ABNORMAL
EOSINOPHIL # BLD: 0.4 K/UL (ref 0–0.4)
EOSINOPHIL NFR BLD: 6 % (ref 0–7)
ERYTHROCYTE [DISTWIDTH] IN BLOOD BY AUTOMATED COUNT: 14.4 % (ref 11.5–14.5)
EST. AVERAGE GLUCOSE BLD GHB EST-MCNC: 114 MG/DL
FOLATE SERPL-MCNC: 11.1 NG/ML (ref 5–21)
GLOBULIN SER CALC-MCNC: 3.1 G/DL (ref 2–4)
GLUCOSE SERPL-MCNC: 82 MG/DL (ref 65–100)
HBA1C MFR BLD: 5.6 % (ref 4–5.6)
HCT VFR BLD AUTO: 43.5 % (ref 36.6–50.3)
HDLC SERPL-MCNC: 40 MG/DL
HDLC SERPL: 3.5 (ref 0–5)
HGB BLD-MCNC: 13.6 G/DL (ref 12.1–17)
IMM GRANULOCYTES # BLD AUTO: 0 K/UL (ref 0–0.04)
IMM GRANULOCYTES NFR BLD AUTO: 1 % (ref 0–0.5)
LDLC SERPL CALC-MCNC: 78 MG/DL (ref 0–100)
LYMPHOCYTES # BLD: 1.1 K/UL (ref 0.8–3.5)
LYMPHOCYTES NFR BLD: 16 % (ref 12–49)
MCH RBC QN AUTO: 29.4 PG (ref 26–34)
MCHC RBC AUTO-ENTMCNC: 31.3 G/DL (ref 30–36.5)
MCV RBC AUTO: 94.2 FL (ref 80–99)
MONOCYTES # BLD: 0.7 K/UL (ref 0–1)
MONOCYTES NFR BLD: 10 % (ref 5–13)
NEUTS SEG # BLD: 4.5 K/UL (ref 1.8–8)
NEUTS SEG NFR BLD: 66 % (ref 32–75)
NRBC # BLD: 0 K/UL (ref 0–0.01)
NRBC BLD-RTO: 0 PER 100 WBC
PLATELET # BLD AUTO: 199 K/UL (ref 150–400)
PMV BLD AUTO: 10.3 FL (ref 8.9–12.9)
POTASSIUM SERPL-SCNC: 4.5 MMOL/L (ref 3.5–5.1)
PROT SERPL-MCNC: 7.4 G/DL (ref 6.4–8.2)
RBC # BLD AUTO: 4.62 M/UL (ref 4.1–5.7)
SODIUM SERPL-SCNC: 141 MMOL/L (ref 136–145)
TRIGL SERPL-MCNC: 110 MG/DL
VIT B12 SERPL-MCNC: 184 PG/ML (ref 193–986)
VLDLC SERPL CALC-MCNC: 22 MG/DL
WBC # BLD AUTO: 6.7 K/UL (ref 4.1–11.1)

## 2023-10-04 NOTE — TELEPHONE ENCOUNTER
Reviewed. Vitamin B12 is low. This can cause a sense of poor balance and fatigue. If you are not having any of the symptoms simply take a vitamin B12 oral supplement around 1000 mcg daily.     If you are having symptoms we can offer B12 injections weekly for a month

## 2023-10-05 NOTE — TELEPHONE ENCOUNTER
Unable to reach pt to inform of lab results. Tried calling twice, seems like someone answers but cannot hear anyone talking. Informed I could not hear anyone talking and to give our office a call back.

## 2023-10-05 NOTE — TELEPHONE ENCOUNTER
verified. Informed pt of message from provider regarding lab results. Pt verified understanding, pt states he saw my chart message and already got the b12 from the store. Pt states he has not had any symptoms. Pt had no further questions.

## 2023-10-10 LAB — HEMOCCULT STL QL IA: NEGATIVE

## 2023-10-27 ENCOUNTER — TELEPHONE (OUTPATIENT)
Age: 68
End: 2023-10-27

## 2023-10-27 RX ORDER — AZITHROMYCIN 250 MG/1
250 TABLET, FILM COATED ORAL SEE ADMIN INSTRUCTIONS
Qty: 6 TABLET | Refills: 0 | Status: SHIPPED | OUTPATIENT
Start: 2023-10-27 | End: 2023-11-01

## 2023-10-27 RX ORDER — BENZONATATE 200 MG/1
200 CAPSULE ORAL 3 TIMES DAILY PRN
Qty: 30 CAPSULE | Refills: 0 | Status: SHIPPED | OUTPATIENT
Start: 2023-10-27 | End: 2023-11-06

## 2023-10-27 NOTE — TELEPHONE ENCOUNTER
Spoke to pt and pt said he called the office last night and lvm to see if he could be seen today. He has a sore throat and cough and congestion that started last night. Covid test was negative. Pt is asking for a z pack and cough medication be sent in. He said you have done this for him before. If you send something he wants it sent to [facility].

## 2023-10-30 ENCOUNTER — TELEPHONE (OUTPATIENT)
Age: 68
End: 2023-10-30

## 2023-10-30 RX ORDER — AZITHROMYCIN 250 MG/1
250 TABLET, FILM COATED ORAL SEE ADMIN INSTRUCTIONS
Qty: 6 TABLET | Refills: 0 | Status: SHIPPED | OUTPATIENT
Start: 2023-10-30 | End: 2023-11-04

## 2023-10-30 RX ORDER — BENZONATATE 200 MG/1
200 CAPSULE ORAL 3 TIMES DAILY PRN
Qty: 30 CAPSULE | Refills: 0 | Status: SHIPPED | OUTPATIENT
Start: 2023-10-30 | End: 2023-11-09

## 2023-10-30 NOTE — TELEPHONE ENCOUNTER
----- Message from MONOCOrajani Garcia sent at 10/30/2023  8:21 AM EDT -----  Subject: Message to Provider    QUESTIONS  Information for Provider? Patient is requesting to have Maddi the Office   Manager to call him regarding a bill he received from the office. None of   the insurance was filed for the [address] appointments. please call him asap.   said to leave personal cell number for him to call back. DOS? June 14, 2023 for $215.00 Insurance was not billed. June 1, 2023 $215.00 Insurance   not billed. Pt called on Friday 10/27/23 to get an appointment. Never   called back and No medication called in. He is very upset regarding all of   this. Please call.  ---------------------------------------------------------------------------  --------------  Nina GARCIA  [phone]; OK to leave message on voicemail  ---------------------------------------------------------------------------  --------------  SCRIPT ANSWERS  Relationship to Patient?  Self

## 2023-11-07 NOTE — TELEPHONE ENCOUNTER
I spoke with patient and let him know that the claims have been resubmitted. I followed up with an email to [employer] asking that the account be put on hold until they hear back from the insurance company.

## 2023-11-08 RX ORDER — TRIAMCINOLONE ACETONIDE 1 MG/G
CREAM TOPICAL
Qty: 60 G | Refills: 3 | Status: SHIPPED | OUTPATIENT
Start: 2023-11-08

## 2023-11-21 DIAGNOSIS — J06.9 URI WITH COUGH AND CONGESTION: ICD-10-CM

## 2023-11-21 RX ORDER — CODEINE PHOSPHATE AND GUAIFENESIN 10; 100 MG/5ML; MG/5ML
SOLUTION ORAL
Qty: 180 ML | Refills: 0 | Status: SHIPPED | OUTPATIENT
Start: 2023-11-21 | End: 2023-11-28

## 2023-11-21 NOTE — TELEPHONE ENCOUNTER
verified. Informed pt of medication refilled. Pt verified understanding and had no further questions.

## 2024-01-03 ENCOUNTER — TELEPHONE (OUTPATIENT)
Age: 69
End: 2024-01-03

## 2024-01-03 NOTE — TELEPHONE ENCOUNTER
----- Message from Jordana Blanco sent at 1/3/2024  4:44 PM EST -----  Subject: Message to Provider    QUESTIONS  Information for Provider? pt would like a call back from Maddi   ---------------------------------------------------------------------------  --------------  CALL BACK INFO  [phone]; OK to leave message on voicemail  ---------------------------------------------------------------------------  --------------  SCRIPT ANSWERS  Relationship to Patient? Self

## 2024-01-15 RX ORDER — TRIAMCINOLONE ACETONIDE 1 MG/G
CREAM TOPICAL
Qty: 60 G | Refills: 3 | Status: SHIPPED | OUTPATIENT
Start: 2024-01-15

## 2024-01-30 DIAGNOSIS — E11.21 TYPE 2 DIABETES MELLITUS WITH DIABETIC NEPHROPATHY, WITHOUT LONG-TERM CURRENT USE OF INSULIN (HCC): ICD-10-CM

## 2024-01-30 RX ORDER — DULAGLUTIDE 1.5 MG/.5ML
1.5 INJECTION, SOLUTION SUBCUTANEOUS WEEKLY
Qty: 6 ML | Refills: 3 | Status: CANCELLED | OUTPATIENT
Start: 2024-01-30

## 2024-01-30 NOTE — TELEPHONE ENCOUNTER
Pt is calling stating he wants the dosage increased on the Trulicity states he has reached a plateau with his weight. He doesn't want this done until after 2/8/24 after his move.      Trulicity with increased dosage

## 2024-06-07 ENCOUNTER — OFFICE VISIT (OUTPATIENT)
Age: 69
End: 2024-06-07
Payer: COMMERCIAL

## 2024-06-07 VITALS
DIASTOLIC BLOOD PRESSURE: 72 MMHG | SYSTOLIC BLOOD PRESSURE: 109 MMHG | HEIGHT: 65 IN | HEART RATE: 110 BPM | WEIGHT: 195.6 LBS | OXYGEN SATURATION: 92 % | TEMPERATURE: 97.5 F | BODY MASS INDEX: 32.59 KG/M2 | RESPIRATION RATE: 16 BRPM

## 2024-06-07 DIAGNOSIS — J18.9 PNEUMONIA OF LEFT LOWER LOBE DUE TO INFECTIOUS ORGANISM: ICD-10-CM

## 2024-06-07 DIAGNOSIS — E11.21 TYPE 2 DIABETES MELLITUS WITH DIABETIC NEPHROPATHY, WITHOUT LONG-TERM CURRENT USE OF INSULIN (HCC): Primary | ICD-10-CM

## 2024-06-07 LAB — HBA1C MFR BLD: 5.6 %

## 2024-06-07 PROCEDURE — 83036 HEMOGLOBIN GLYCOSYLATED A1C: CPT | Performed by: FAMILY MEDICINE

## 2024-06-07 PROCEDURE — 99214 OFFICE O/P EST MOD 30 MIN: CPT | Performed by: FAMILY MEDICINE

## 2024-06-07 PROCEDURE — 3074F SYST BP LT 130 MM HG: CPT | Performed by: FAMILY MEDICINE

## 2024-06-07 PROCEDURE — 3078F DIAST BP <80 MM HG: CPT | Performed by: FAMILY MEDICINE

## 2024-06-07 PROCEDURE — 1123F ACP DISCUSS/DSCN MKR DOCD: CPT | Performed by: FAMILY MEDICINE

## 2024-06-07 RX ORDER — CEFDINIR 300 MG/1
300 CAPSULE ORAL 2 TIMES DAILY
COMMUNITY
Start: 2024-06-02 | End: 2024-06-12

## 2024-06-07 RX ORDER — BENZONATATE 200 MG/1
200 CAPSULE ORAL 3 TIMES DAILY PRN
Qty: 30 CAPSULE | Refills: 0 | Status: SHIPPED | OUTPATIENT
Start: 2024-06-07 | End: 2024-06-17

## 2024-06-07 NOTE — PROGRESS NOTES
KELLY Smith is a 69 y.o. male who presents to follow-up emergency room encounter 5/26 and 6/1.    Symptoms started 5/22 while on vacation in [place].    Diagnosed with influenza 5/26.  Given albuterol and prednisone.  For 5 days    Evidently started feeling better took a turn for the worse 6/1.  Back in the emergency room 6/1 complaint of fever, cough, shortness of breath.,  Shivers, temperature 99.1 and blood sugar running relatively high to 168.    Met SIRS criteria and offered admission.  Patient declined.  Given 1 dose IV Rocephin and azithromycin.  Blood cultures pending.    Discharge cefdinir and azithromycin    Labs remarkable creatinine 1.42  GFR 53  Lactate 1.9  WBC 13.9  Hemoglobin 12.2    X-ray remarkable for \"mild left basilar atelectasis versus infiltrate    PMHx:  Past Medical History:   Diagnosis Date    Hypertension     Obesity     Type 2 diabetes mellitus without complication (HCC)        Meds:   Current Outpatient Medications   Medication Sig Dispense Refill    cefdinir (OMNICEF) 300 MG capsule Take 1 capsule by mouth 2 times daily      benzonatate (TESSALON) 200 MG capsule Take 1 capsule by mouth 3 times daily as needed for Cough 30 capsule 0    Dulaglutide 3 MG/0.5ML SOPN Inject 3 mg into the skin once a week 6 mL 3    triamcinolone (KENALOG) 0.1 % cream Apply topically 2 times daily. 60 g 3    blood glucose test strips (ASCENSIA AUTODISC VI;ONE TOUCH ULTRA TEST VI) strip USE TO CHECK BLOOD SUGAR TWICE A  strip 3    sertraline (ZOLOFT) 25 MG tablet Take 1 tablet by mouth daily 90 tablet 3    lisinopril (PRINIVIL;ZESTRIL) 10 MG tablet Take 1 tablet by mouth daily 90 tablet 3    metFORMIN (GLUCOPHAGE) 500 MG tablet Take 2 tablets by mouth 2 times daily (with meals) 360 tablet 3    amLODIPine (NORVASC) 10 MG tablet Take 1 tablet by mouth daily 90 tablet 3    allopurinol (ZYLOPRIM) 100 MG tablet Take 2 tablets by mouth daily 180 tablet 3    hydroCHLOROthiazide (HYDRODIURIL) 12.5 MG tablet

## 2024-06-07 NOTE — PROGRESS NOTES
Chief Complaint   Patient presents with    Follow-up         Health Maintenance Due   Topic Date Due    Hepatitis A vaccine (1 of 2 - Risk 2-dose series) Never done    Hepatitis B vaccine (1 of 3 - Risk 3-dose series) Never done    Respiratory Syncytial Virus (RSV) Pregnant or age 60 yrs+ (1 - 1-dose 60+ series) Never done    AAA screen  05/17/2020    DTaP/Tdap/Td vaccine (1 - Tdap) 04/15/2022    COVID-19 Vaccine (4 - 2023-24 season) 09/01/2023    Diabetic foot exam  09/13/2023    Diabetic Alb to Cr ratio (uACR) test  09/13/2023    Depression Monitoring  06/14/2024         \"Have you been to the ER, urgent care clinic since your last visit?  Hospitalized since your last visit?\"    YES - When: approximately 2  weeks ago.  Where and Why: [facility] [place].    “Have you seen or consulted any other health care providers outside of [facility] since your last visit?”    NO

## 2024-06-24 RX ORDER — BENZONATATE 100 MG/1
100 CAPSULE ORAL 3 TIMES DAILY PRN
Qty: 21 CAPSULE | Refills: 3 | Status: SHIPPED | OUTPATIENT
Start: 2024-06-24

## 2024-06-24 RX ORDER — BENZONATATE 100 MG/1
100 CAPSULE ORAL 3 TIMES DAILY PRN
COMMUNITY
End: 2024-06-24 | Stop reason: SDUPTHER

## 2024-07-21 ENCOUNTER — PATIENT MESSAGE (OUTPATIENT)
Age: 69
End: 2024-07-21

## 2024-07-23 NOTE — TELEPHONE ENCOUNTER
For the record we received Trulicity prior authorization request on July 8 with a single question \"what is the indication or diagnosis\" answer diabetes.  Form is completed

## 2024-07-29 ENCOUNTER — TELEPHONE (OUTPATIENT)
Age: 69
End: 2024-07-29

## 2024-07-29 NOTE — TELEPHONE ENCOUNTER
PA approved for Trulicty 3 mg  Case ID:  [id number]  Approval date:  06.29.2024 thru 07.29.2025    Pt notified.

## 2024-08-20 DIAGNOSIS — E11.21 TYPE 2 DIABETES MELLITUS WITH DIABETIC NEPHROPATHY, WITHOUT LONG-TERM CURRENT USE OF INSULIN (HCC): ICD-10-CM

## 2024-09-04 RX ORDER — SERTRALINE HYDROCHLORIDE 25 MG/1
25 TABLET, FILM COATED ORAL DAILY
Qty: 90 TABLET | Refills: 3 | Status: SHIPPED | OUTPATIENT
Start: 2024-09-04

## 2024-09-04 RX ORDER — ALLOPURINOL 100 MG/1
200 TABLET ORAL DAILY
Qty: 180 TABLET | Refills: 3 | Status: SHIPPED | OUTPATIENT
Start: 2024-09-04

## 2024-09-04 RX ORDER — LISINOPRIL 10 MG/1
10 TABLET ORAL DAILY
Qty: 90 TABLET | Refills: 3 | Status: SHIPPED | OUTPATIENT
Start: 2024-09-04

## 2024-09-12 RX ORDER — HYDROCHLOROTHIAZIDE 12.5 MG/1
12.5 TABLET ORAL DAILY
Qty: 90 TABLET | Refills: 3 | Status: SHIPPED | OUTPATIENT
Start: 2024-09-12

## 2024-09-12 RX ORDER — AMLODIPINE BESYLATE 10 MG/1
10 TABLET ORAL DAILY
Qty: 90 TABLET | Refills: 3 | Status: SHIPPED | OUTPATIENT
Start: 2024-09-12

## 2025-01-01 DIAGNOSIS — E11.21 TYPE 2 DIABETES MELLITUS WITH DIABETIC NEPHROPATHY, WITHOUT LONG-TERM CURRENT USE OF INSULIN (HCC): ICD-10-CM

## 2025-01-02 RX ORDER — DULAGLUTIDE 3 MG/.5ML
INJECTION, SOLUTION SUBCUTANEOUS
Qty: 6 ML | Refills: 3 | Status: SHIPPED | OUTPATIENT
Start: 2025-01-02

## 2025-01-05 NOTE — TELEPHONE ENCOUNTER
Chief Complaint   Patient presents with    Medication Refill     Last refill:   4 months ago (6/21/2018)   sertraline (ZOLOFT) 25 mg tablet   Take 1 Tab by mouth daily.    Dispense: 90 Tab     Refills: 1     Start: 6/21/2018     By: Toyin Padilla MD      Last Appointment With Me:  9/21/2018
Pharmacy is requesting a refill on med    Requested Prescriptions     Pending Prescriptions Disp Refills    sertraline (ZOLOFT) 25 mg tablet 90 Tab 1     Sig: Take 1 Tab by mouth daily.
Rollator

## 2025-01-31 ENCOUNTER — OFFICE VISIT (OUTPATIENT)
Age: 70
End: 2025-01-31
Payer: COMMERCIAL

## 2025-01-31 VITALS
HEIGHT: 65 IN | TEMPERATURE: 98.2 F | HEART RATE: 95 BPM | WEIGHT: 200.4 LBS | OXYGEN SATURATION: 96 % | RESPIRATION RATE: 18 BRPM | BODY MASS INDEX: 33.39 KG/M2 | SYSTOLIC BLOOD PRESSURE: 123 MMHG | DIASTOLIC BLOOD PRESSURE: 77 MMHG

## 2025-01-31 DIAGNOSIS — Z12.11 COLON CANCER SCREENING: ICD-10-CM

## 2025-01-31 DIAGNOSIS — E61.1 IRON DEFICIENCY: ICD-10-CM

## 2025-01-31 DIAGNOSIS — E55.9 VITAMIN D DEFICIENCY: ICD-10-CM

## 2025-01-31 DIAGNOSIS — Z12.5 SCREENING FOR MALIGNANT NEOPLASM OF PROSTATE: ICD-10-CM

## 2025-01-31 DIAGNOSIS — E53.8 B12 DEFICIENCY: ICD-10-CM

## 2025-01-31 DIAGNOSIS — E11.21 TYPE 2 DIABETES MELLITUS WITH DIABETIC NEPHROPATHY, WITHOUT LONG-TERM CURRENT USE OF INSULIN (HCC): ICD-10-CM

## 2025-01-31 DIAGNOSIS — Z00.00 ROUTINE GENERAL MEDICAL EXAMINATION AT A HEALTH CARE FACILITY: Primary | ICD-10-CM

## 2025-01-31 LAB — HBA1C MFR BLD: 5.3 %

## 2025-01-31 PROCEDURE — 99397 PER PM REEVAL EST PAT 65+ YR: CPT | Performed by: FAMILY MEDICINE

## 2025-01-31 PROCEDURE — 3074F SYST BP LT 130 MM HG: CPT | Performed by: FAMILY MEDICINE

## 2025-01-31 PROCEDURE — 3078F DIAST BP <80 MM HG: CPT | Performed by: FAMILY MEDICINE

## 2025-01-31 PROCEDURE — 83036 HEMOGLOBIN GLYCOSYLATED A1C: CPT | Performed by: FAMILY MEDICINE

## 2025-01-31 RX ORDER — TIRZEPATIDE 5 MG/.5ML
5 INJECTION, SOLUTION SUBCUTANEOUS
Qty: 6 ML | Refills: 3 | Status: SHIPPED | OUTPATIENT
Start: 2025-01-31

## 2025-01-31 SDOH — ECONOMIC STABILITY: INCOME INSECURITY: IN THE LAST 12 MONTHS, WAS THERE A TIME WHEN YOU WERE NOT ABLE TO PAY THE MORTGAGE OR RENT ON TIME?: NO

## 2025-01-31 SDOH — ECONOMIC STABILITY: TRANSPORTATION INSECURITY
IN THE PAST 12 MONTHS, HAS THE LACK OF TRANSPORTATION KEPT YOU FROM MEDICAL APPOINTMENTS OR FROM GETTING MEDICATIONS?: NO

## 2025-01-31 SDOH — ECONOMIC STABILITY: TRANSPORTATION INSECURITY
IN THE PAST 12 MONTHS, HAS LACK OF TRANSPORTATION KEPT YOU FROM MEETINGS, WORK, OR FROM GETTING THINGS NEEDED FOR DAILY LIVING?: NO

## 2025-01-31 SDOH — ECONOMIC STABILITY: FOOD INSECURITY: WITHIN THE PAST 12 MONTHS, YOU WORRIED THAT YOUR FOOD WOULD RUN OUT BEFORE YOU GOT MONEY TO BUY MORE.: NEVER TRUE

## 2025-01-31 SDOH — ECONOMIC STABILITY: FOOD INSECURITY: WITHIN THE PAST 12 MONTHS, THE FOOD YOU BOUGHT JUST DIDN'T LAST AND YOU DIDN'T HAVE MONEY TO GET MORE.: NEVER TRUE

## 2025-01-31 ASSESSMENT — PATIENT HEALTH QUESTIONNAIRE - PHQ9
1. LITTLE INTEREST OR PLEASURE IN DOING THINGS: NOT AT ALL
7. TROUBLE CONCENTRATING ON THINGS, SUCH AS READING THE NEWSPAPER OR WATCHING TELEVISION: NOT AT ALL
SUM OF ALL RESPONSES TO PHQ9 QUESTIONS 1 & 2: 0
2. FEELING DOWN, DEPRESSED OR HOPELESS: NOT AT ALL
SUM OF ALL RESPONSES TO PHQ QUESTIONS 1-9: 0
SUM OF ALL RESPONSES TO PHQ QUESTIONS 1-9: 0
8. MOVING OR SPEAKING SO SLOWLY THAT OTHER PEOPLE COULD HAVE NOTICED. OR THE OPPOSITE, BEING SO FIGETY OR RESTLESS THAT YOU HAVE BEEN MOVING AROUND A LOT MORE THAN USUAL: NOT AT ALL
SUM OF ALL RESPONSES TO PHQ QUESTIONS 1-9: 0
4. FEELING TIRED OR HAVING LITTLE ENERGY: NOT AT ALL
5. POOR APPETITE OR OVEREATING: NOT AT ALL
10. IF YOU CHECKED OFF ANY PROBLEMS, HOW DIFFICULT HAVE THESE PROBLEMS MADE IT FOR YOU TO DO YOUR WORK, TAKE CARE OF THINGS AT HOME, OR GET ALONG WITH OTHER PEOPLE: NOT DIFFICULT AT ALL
6. FEELING BAD ABOUT YOURSELF - OR THAT YOU ARE A FAILURE OR HAVE LET YOURSELF OR YOUR FAMILY DOWN: NOT AT ALL
SUM OF ALL RESPONSES TO PHQ QUESTIONS 1-9: 0
3. TROUBLE FALLING OR STAYING ASLEEP: NOT AT ALL
9. THOUGHTS THAT YOU WOULD BE BETTER OFF DEAD, OR OF HURTING YOURSELF: NOT AT ALL

## 2025-01-31 NOTE — PATIENT INSTRUCTIONS
Reduce metformin 500 mg twice daily.    If you would like to try less blood pressure medicine try stopping hydrochlorothiazide 12.5 mg and monitor your blood pressure at home.  Goal blood pressure less than 140/90

## 2025-02-01 LAB
25(OH)D3 SERPL-MCNC: 35.2 NG/ML (ref 30–100)
ALBUMIN SERPL-MCNC: 4.5 G/DL (ref 3.5–5)
ALBUMIN/GLOB SERPL: 1.5 (ref 1.1–2.2)
ALP SERPL-CCNC: 71 U/L (ref 45–117)
ALT SERPL-CCNC: 22 U/L (ref 12–78)
ANION GAP SERPL CALC-SCNC: 8 MMOL/L (ref 2–12)
AST SERPL-CCNC: 18 U/L (ref 15–37)
BASOPHILS # BLD: 0.07 K/UL (ref 0–0.1)
BASOPHILS NFR BLD: 0.9 % (ref 0–1)
BILIRUB SERPL-MCNC: 0.8 MG/DL (ref 0.2–1)
BUN SERPL-MCNC: 25 MG/DL (ref 6–20)
BUN/CREAT SERPL: 16 (ref 12–20)
CALCIUM SERPL-MCNC: 10 MG/DL (ref 8.5–10.1)
CHLORIDE SERPL-SCNC: 104 MMOL/L (ref 97–108)
CHOLEST SERPL-MCNC: 172 MG/DL
CO2 SERPL-SCNC: 28 MMOL/L (ref 21–32)
CREAT SERPL-MCNC: 1.52 MG/DL (ref 0.7–1.3)
CREAT UR-MCNC: 139 MG/DL
DIFFERENTIAL METHOD BLD: NORMAL
EOSINOPHIL # BLD: 0.28 K/UL (ref 0–0.4)
EOSINOPHIL NFR BLD: 3.5 % (ref 0–7)
ERYTHROCYTE [DISTWIDTH] IN BLOOD BY AUTOMATED COUNT: 13.6 % (ref 11.5–14.5)
FERRITIN SERPL-MCNC: 224 NG/ML (ref 26–388)
FOLATE SERPL-MCNC: 15.8 NG/ML (ref 5–21)
GLOBULIN SER CALC-MCNC: 3 G/DL (ref 2–4)
GLUCOSE SERPL-MCNC: 77 MG/DL (ref 65–100)
HCT VFR BLD AUTO: 43.8 % (ref 36.6–50.3)
HDLC SERPL-MCNC: 43 MG/DL
HDLC SERPL: 4 (ref 0–5)
HGB BLD-MCNC: 13.8 G/DL (ref 12.1–17)
IMM GRANULOCYTES # BLD AUTO: 0.04 K/UL (ref 0–0.04)
IMM GRANULOCYTES NFR BLD AUTO: 0.5 % (ref 0–0.5)
IRON SATN MFR SERPL: 19 % (ref 20–50)
IRON SERPL-MCNC: 57 UG/DL (ref 35–150)
LDLC SERPL CALC-MCNC: 97.8 MG/DL (ref 0–100)
LYMPHOCYTES # BLD: 1.43 K/UL (ref 0.8–3.5)
LYMPHOCYTES NFR BLD: 17.8 % (ref 12–49)
MCH RBC QN AUTO: 29.7 PG (ref 26–34)
MCHC RBC AUTO-ENTMCNC: 31.5 G/DL (ref 30–36.5)
MCV RBC AUTO: 94.4 FL (ref 80–99)
MICROALBUMIN UR-MCNC: 145 MG/DL
MICROALBUMIN/CREAT UR-RTO: 1043 MG/G (ref 0–30)
MONOCYTES # BLD: 0.81 K/UL (ref 0–1)
MONOCYTES NFR BLD: 10.1 % (ref 5–13)
NEUTS SEG # BLD: 5.41 K/UL (ref 1.8–8)
NEUTS SEG NFR BLD: 67.2 % (ref 32–75)
NRBC # BLD: 0 K/UL (ref 0–0.01)
NRBC BLD-RTO: 0 PER 100 WBC
PLATELET # BLD AUTO: 241 K/UL (ref 150–400)
PMV BLD AUTO: 10 FL (ref 8.9–12.9)
POTASSIUM SERPL-SCNC: 3.9 MMOL/L (ref 3.5–5.1)
PROT SERPL-MCNC: 7.5 G/DL (ref 6.4–8.2)
PSA SERPL-MCNC: 2.4 NG/ML (ref 0.01–4)
RBC # BLD AUTO: 4.64 M/UL (ref 4.1–5.7)
SODIUM SERPL-SCNC: 140 MMOL/L (ref 136–145)
TIBC SERPL-MCNC: 305 UG/DL (ref 250–450)
TRIGL SERPL-MCNC: 156 MG/DL
VIT B12 SERPL-MCNC: >2000 PG/ML (ref 193–986)
VLDLC SERPL CALC-MCNC: 31.2 MG/DL
WBC # BLD AUTO: 8 K/UL (ref 4.1–11.1)

## 2025-02-07 LAB — HEMOCCULT STL QL IA: NEGATIVE

## 2025-02-21 RX ORDER — BLOOD SUGAR DIAGNOSTIC
STRIP MISCELLANEOUS
Qty: 200 STRIP | Refills: 3 | Status: SHIPPED | OUTPATIENT
Start: 2025-02-21

## 2025-03-31 DIAGNOSIS — E11.21 TYPE 2 DIABETES MELLITUS WITH DIABETIC NEPHROPATHY, WITHOUT LONG-TERM CURRENT USE OF INSULIN (HCC): ICD-10-CM

## 2025-03-31 RX ORDER — TIRZEPATIDE 7.5 MG/.5ML
7.5 INJECTION, SOLUTION SUBCUTANEOUS
Qty: 6 ML | Refills: 3 | Status: SHIPPED | OUTPATIENT
Start: 2025-03-31

## 2025-03-31 RX ORDER — TRIAMCINOLONE ACETONIDE 1 MG/G
CREAM TOPICAL
Qty: 60 G | Refills: 3 | Status: SHIPPED | OUTPATIENT
Start: 2025-03-31

## 2025-04-15 ENCOUNTER — TELEPHONE (OUTPATIENT)
Facility: CLINIC | Age: 70
End: 2025-04-15

## 2025-04-15 NOTE — TELEPHONE ENCOUNTER
Requesting a refill of hydrochlorothiazide because of lower extremity swelling.    I have an alternative suggestion    Amlodipine can have a side effect of lower extremity swelling.  If blood pressure is doing okay would you be okay trying to reduce the dose of the amlodipine to see if that fixes your swelling?

## 2025-04-15 NOTE — TELEPHONE ENCOUNTER
I called patient and let him know of Dr. Harris's reccomendations. Patient split his daily amlodipine and see if that helps swelling, and will update us on Friday.

## 2025-04-15 NOTE — TELEPHONE ENCOUNTER
Disp Refills Start End    hydroCHLOROthiazide 12.5 MG tablet 90 tablet 3 9/12/2024 --    Sig - Route: TAKE 1 TABLET DAILY - Oral        Patient would like to go back on this medication due to his feet and ankles have been swelling.      Please let patient know if Dr. Harris will fill this medication    [facility] [place]

## 2025-04-17 RX ORDER — HYDROCHLOROTHIAZIDE 12.5 MG/1
12.5 TABLET ORAL DAILY
Qty: 90 TABLET | Refills: 3 | Status: SHIPPED | OUTPATIENT
Start: 2025-04-17

## 2025-07-01 ENCOUNTER — TELEPHONE (OUTPATIENT)
Age: 70
End: 2025-07-01

## 2025-07-01 DIAGNOSIS — E11.21 TYPE 2 DIABETES MELLITUS WITH DIABETIC NEPHROPATHY, WITHOUT LONG-TERM CURRENT USE OF INSULIN (HCC): ICD-10-CM

## 2025-07-01 RX ORDER — TIRZEPATIDE 10 MG/.5ML
10 INJECTION, SOLUTION SUBCUTANEOUS
Qty: 6 ML | Refills: 3 | Status: SHIPPED | OUTPATIENT
Start: 2025-07-01

## 2025-07-01 RX ORDER — TIRZEPATIDE 7.5 MG/.5ML
7.5 INJECTION, SOLUTION SUBCUTANEOUS
Qty: 6 ML | Refills: 3 | Status: SHIPPED | OUTPATIENT
Start: 2025-07-01 | End: 2025-07-01 | Stop reason: DRUGHIGH

## 2025-07-01 NOTE — TELEPHONE ENCOUNTER
Patient is requesting refill on Monjauro medication sent to [facility].  [phone].     Tirzepatide (MOUNJARO) 7.5 MG/0.5ML SOAJ

## 2025-07-11 ENCOUNTER — TELEPHONE (OUTPATIENT)
Age: 70
End: 2025-07-11

## 2025-07-11 DIAGNOSIS — E11.21 TYPE 2 DIABETES MELLITUS WITH DIABETIC NEPHROPATHY, WITHOUT LONG-TERM CURRENT USE OF INSULIN (HCC): ICD-10-CM

## 2025-07-11 NOTE — TELEPHONE ENCOUNTER
Checking on refill request from 7/1 and checking on PA status, I told him I will make sure refill was completed and will check with Catrina about PA.

## 2025-07-14 RX ORDER — TIRZEPATIDE 10 MG/.5ML
10 INJECTION, SOLUTION SUBCUTANEOUS
Qty: 6 ML | Refills: 3 | Status: SHIPPED | OUTPATIENT
Start: 2025-07-14

## 2025-07-14 NOTE — TELEPHONE ENCOUNTER
Abdiel Smith     Miranda: [id number]    Mounjaro 10MG/0.5ML auto-injectors    [employer] Electronic Prior Authorization Request Form    BIN [id number]  PCN [id number]  G [id number]    ID  [id number]    There was an error with your request  Member Not Found   Per CoverMyMeds

## 2025-07-14 NOTE — TELEPHONE ENCOUNTER
Patient is requesting a phone call regarding this medication and potential prior auth. He says that is not the right dosage and is the reason it is delayed. He wants a phone call back today, didn't receive one from Friday. Please advise. [phone].

## 2025-07-14 NOTE — TELEPHONE ENCOUNTER
Pt states that the 10mg Mounjaro is to go to [facility].  He has recv'd the 7.5 mg and wants to know what he should do.  He already paid the $75 for the 90 day supply.

## 2025-07-14 NOTE — TELEPHONE ENCOUNTER
How did the prior authorization on the Mounjaro 10 mg go?    If approved for Mounjaro 10 mg he has the option of going up on the dose immediately or he can use the Mounjaro 7.5 mg that he received and then get the Mounjaro 10 mg in 3 months (I sent Mounjaro 10 mg to [facility] as requested)

## 2025-07-15 NOTE — TELEPHONE ENCOUNTER
Spoke with the pt.  He was able to get his 10 mg Mounjaro and have his account credited.  He states that he was advised by the insurance company to throw away the 7.5 mg.  Marking this done.

## 2025-07-16 DIAGNOSIS — E11.21 TYPE 2 DIABETES MELLITUS WITH DIABETIC NEPHROPATHY, WITHOUT LONG-TERM CURRENT USE OF INSULIN (HCC): ICD-10-CM

## 2025-07-16 RX ORDER — HYDROCHLOROTHIAZIDE 12.5 MG/1
TABLET ORAL
Refills: 0 | OUTPATIENT
Start: 2025-07-16

## 2025-07-16 RX ORDER — HYDROCHLOROTHIAZIDE 12.5 MG/1
12.5 TABLET ORAL DAILY
Qty: 90 TABLET | Refills: 3 | Status: SHIPPED | OUTPATIENT
Start: 2025-07-16

## 2025-08-01 ENCOUNTER — OFFICE VISIT (OUTPATIENT)
Age: 70
End: 2025-08-01
Payer: COMMERCIAL

## 2025-08-01 VITALS
TEMPERATURE: 97.5 F | DIASTOLIC BLOOD PRESSURE: 86 MMHG | SYSTOLIC BLOOD PRESSURE: 128 MMHG | HEIGHT: 65 IN | BODY MASS INDEX: 33.05 KG/M2 | HEART RATE: 92 BPM | RESPIRATION RATE: 17 BRPM | WEIGHT: 198.4 LBS | OXYGEN SATURATION: 97 %

## 2025-08-01 DIAGNOSIS — I10 ESSENTIAL (PRIMARY) HYPERTENSION: ICD-10-CM

## 2025-08-01 DIAGNOSIS — N18.31 STAGE 3A CHRONIC KIDNEY DISEASE (HCC): ICD-10-CM

## 2025-08-01 DIAGNOSIS — E11.21 TYPE 2 DIABETES MELLITUS WITH DIABETIC NEPHROPATHY, WITHOUT LONG-TERM CURRENT USE OF INSULIN (HCC): Primary | ICD-10-CM

## 2025-08-01 PROBLEM — J20.9 ACUTE BRONCHITIS: Status: RESOLVED | Noted: 2018-03-22 | Resolved: 2025-08-01

## 2025-08-01 LAB — HBA1C MFR BLD: 5.3 %

## 2025-08-01 PROCEDURE — 3074F SYST BP LT 130 MM HG: CPT | Performed by: FAMILY MEDICINE

## 2025-08-01 PROCEDURE — 3044F HG A1C LEVEL LT 7.0%: CPT | Performed by: FAMILY MEDICINE

## 2025-08-01 PROCEDURE — 1123F ACP DISCUSS/DSCN MKR DOCD: CPT | Performed by: FAMILY MEDICINE

## 2025-08-01 PROCEDURE — 99214 OFFICE O/P EST MOD 30 MIN: CPT | Performed by: FAMILY MEDICINE

## 2025-08-01 PROCEDURE — 3079F DIAST BP 80-89 MM HG: CPT | Performed by: FAMILY MEDICINE

## 2025-08-01 PROCEDURE — 83036 HEMOGLOBIN GLYCOSYLATED A1C: CPT | Performed by: FAMILY MEDICINE

## 2025-08-01 NOTE — PROGRESS NOTES
KELLY Smith is a 70 y.o. male who presents to follow-up on chronic medical issues    Recently had some back trouble in recent months that affected his ability to get comfortable night sleep.  Did some home exercises and it got better.  Started having some left hip pain in the last few weeks.  This is not obviously related to his hip joint.  There is a full painless range of motion in the hip.  There is tight but nontender hip flexor.  Stable knee joint.  Will provide with some hip exercises as well      PMHx:  Past Medical History:   Diagnosis Date    Hypertension     Obesity     Type 2 diabetes mellitus without complication (HCC)        Meds:   Current Outpatient Medications   Medication Sig Dispense Refill    hydroCHLOROthiazide 12.5 MG tablet Take 1 tablet by mouth daily 90 tablet 3    metFORMIN (GLUCOPHAGE) 500 MG tablet Take 1 tablet by mouth 2 times daily (with meals) 180 tablet 3    Tirzepatide (MOUNJARO) 10 MG/0.5ML SOAJ pen Inject 10 mg into the skin every 7 days 6 mL 3    triamcinolone (KENALOG) 0.1 % cream Apply topically 2 times daily. 60 g 3    ONETOUCH ULTRA strip CHECK BLOOD SUGAR TWO TIMES A  strip 3    allopurinol (ZYLOPRIM) 100 MG tablet TAKE 2 TABLETS DAILY 180 tablet 3    lisinopril (PRINIVIL;ZESTRIL) 10 MG tablet TAKE 1 TABLET DAILY 90 tablet 3    sertraline (ZOLOFT) 25 MG tablet TAKE 1 TABLET DAILY 90 tablet 3    benzonatate (TESSALON) 100 MG capsule Take 1 capsule by mouth 3 times daily as needed for Cough 21 capsule 3    APPLE CIDER VINEGAR PO Take by mouth daily      Lancets MISC Check blood glucose fasting and at bed time      clotrimazole-betamethasone (LOTRISONE) 1-0.05 % cream ceived the following from [facility]: Outside name: clotrimazole-betamethasone (LOTRISONE) topical cream      diclofenac (VOLTAREN) 75 MG EC tablet Take 1 tablet by mouth 2 times daily as needed      indomethacin (INDOCIN) 50 MG capsule Take 1 pill TID PO for duration of gout flare

## 2025-08-01 NOTE — PROGRESS NOTES
Health Maintenance Due   Topic Date Due    Hepatitis A vaccine (1 of 2 - Risk 2-dose series) Never done    Hepatitis B vaccine (1 of 3 - Risk 3-dose series) Never done    Respiratory Syncytial Virus (RSV) Pregnant or age 60 yrs+ (1 - Risk 60-74 years 1-dose series) Never done    AAA screen  05/17/2020    DTaP/Tdap/Td vaccine (1 - Tdap) 04/15/2022    Diabetic foot exam  09/13/2023    COVID-19 Vaccine (4 - 2024-25 season) 09/01/2024    Flu vaccine (1) 08/01/2025

## 2025-09-02 RX ORDER — ALLOPURINOL 100 MG/1
200 TABLET ORAL DAILY
Qty: 180 TABLET | Refills: 3 | Status: SHIPPED | OUTPATIENT
Start: 2025-09-02

## 2025-09-02 RX ORDER — LISINOPRIL 10 MG/1
10 TABLET ORAL DAILY
Qty: 90 TABLET | Refills: 3 | Status: SHIPPED | OUTPATIENT
Start: 2025-09-02

## 2025-09-02 RX ORDER — SERTRALINE HYDROCHLORIDE 25 MG/1
25 TABLET, FILM COATED ORAL DAILY
Qty: 90 TABLET | Refills: 3 | Status: SHIPPED | OUTPATIENT
Start: 2025-09-02